# Patient Record
Sex: MALE | Race: WHITE | Employment: PART TIME | URBAN - METROPOLITAN AREA
[De-identification: names, ages, dates, MRNs, and addresses within clinical notes are randomized per-mention and may not be internally consistent; named-entity substitution may affect disease eponyms.]

---

## 2017-03-07 ENCOUNTER — NURSE ONLY (OUTPATIENT)
Dept: CARDIOLOGY CLINIC | Age: 57
End: 2017-03-07

## 2017-03-07 DIAGNOSIS — Z95.810 AUTOMATIC IMPLANTABLE CARDIOVERTER-DEFIBRILLATOR IN SITU: ICD-10-CM

## 2017-03-07 DIAGNOSIS — I50.22 CHRONIC SYSTOLIC HEART FAILURE (HCC): ICD-10-CM

## 2017-03-07 PROCEDURE — 93297 REM INTERROG DEV EVAL ICPMS: CPT | Performed by: INTERNAL MEDICINE

## 2017-03-07 PROCEDURE — 93295 DEV INTERROG REMOTE 1/2/MLT: CPT | Performed by: INTERNAL MEDICINE

## 2017-03-07 PROCEDURE — 93296 REM INTERROG EVL PM/IDS: CPT | Performed by: INTERNAL MEDICINE

## 2017-04-21 DIAGNOSIS — I10 ESSENTIAL HYPERTENSION: ICD-10-CM

## 2017-04-24 RX ORDER — OLMESARTAN MEDOXOMIL 20 MG/1
TABLET ORAL
Qty: 45 TABLET | Refills: 6 | Status: SHIPPED | OUTPATIENT
Start: 2017-04-24 | End: 2017-10-11 | Stop reason: SDUPTHER

## 2017-05-10 DIAGNOSIS — I42.9 CARDIOMYOPATHY (HCC): ICD-10-CM

## 2017-05-11 RX ORDER — METOPROLOL SUCCINATE 200 MG/1
200 TABLET, EXTENDED RELEASE ORAL
Qty: 30 TABLET | Refills: 5 | Status: SHIPPED | OUTPATIENT
Start: 2017-05-11 | End: 2017-10-06 | Stop reason: SDUPTHER

## 2017-06-06 ENCOUNTER — PROCEDURE VISIT (OUTPATIENT)
Dept: CARDIOLOGY CLINIC | Age: 57
End: 2017-06-06

## 2017-06-06 ENCOUNTER — OFFICE VISIT (OUTPATIENT)
Dept: CARDIOLOGY CLINIC | Age: 57
End: 2017-06-06

## 2017-06-06 VITALS
BODY MASS INDEX: 36.16 KG/M2 | SYSTOLIC BLOOD PRESSURE: 136 MMHG | DIASTOLIC BLOOD PRESSURE: 76 MMHG | HEART RATE: 61 BPM | WEIGHT: 252.6 LBS | HEIGHT: 70 IN

## 2017-06-06 DIAGNOSIS — I44.2 CHB (COMPLETE HEART BLOCK) (HCC): ICD-10-CM

## 2017-06-06 DIAGNOSIS — R06.02 SOB (SHORTNESS OF BREATH): ICD-10-CM

## 2017-06-06 DIAGNOSIS — I15.9 SECONDARY HYPERTENSION: ICD-10-CM

## 2017-06-06 DIAGNOSIS — Z95.0 S/P PLACEMENT OF CARDIAC PACEMAKER: ICD-10-CM

## 2017-06-06 DIAGNOSIS — Z95.810 AUTOMATIC IMPLANTABLE CARDIOVERTER-DEFIBRILLATOR IN SITU: ICD-10-CM

## 2017-06-06 DIAGNOSIS — R00.1 BRADYCARDIA: Primary | ICD-10-CM

## 2017-06-06 DIAGNOSIS — I50.22 CHRONIC SYSTOLIC HEART FAILURE (HCC): ICD-10-CM

## 2017-06-06 DIAGNOSIS — Z95.810 BIVENTRICULAR AUTOMATIC IMPLANTABLE CARDIOVERTER DEFIBRILLATOR IN SITU: ICD-10-CM

## 2017-06-06 PROCEDURE — 93290 INTERROG DEV EVAL ICPMS IP: CPT | Performed by: INTERNAL MEDICINE

## 2017-06-06 PROCEDURE — 93284 PRGRMG EVAL IMPLANTABLE DFB: CPT | Performed by: INTERNAL MEDICINE

## 2017-06-06 PROCEDURE — 99214 OFFICE O/P EST MOD 30 MIN: CPT | Performed by: INTERNAL MEDICINE

## 2017-08-17 DIAGNOSIS — I50.22 CHRONIC SYSTOLIC HEART FAILURE (HCC): Primary | ICD-10-CM

## 2017-08-30 ENCOUNTER — HOSPITAL ENCOUNTER (OUTPATIENT)
Dept: NON INVASIVE DIAGNOSTICS | Age: 57
Discharge: HOME | End: 2017-08-30
Attending: INTERNAL MEDICINE | Admitting: INTERNAL MEDICINE

## 2017-08-30 DIAGNOSIS — I50.22 CHRONIC SYSTOLIC HEART FAILURE (HCC): ICD-10-CM

## 2017-08-30 LAB
LV EF: 40 %
LVEF MODALITY: NORMAL

## 2017-08-31 ENCOUNTER — TELEPHONE (OUTPATIENT)
Dept: CARDIOLOGY CLINIC | Age: 57
End: 2017-08-31

## 2017-09-12 ENCOUNTER — NURSE ONLY (OUTPATIENT)
Dept: CARDIOLOGY CLINIC | Age: 57
End: 2017-09-12

## 2017-09-12 DIAGNOSIS — Z95.810 BIVENTRICULAR AUTOMATIC IMPLANTABLE CARDIOVERTER DEFIBRILLATOR IN SITU: ICD-10-CM

## 2017-09-12 DIAGNOSIS — I50.22 CHRONIC SYSTOLIC HEART FAILURE (HCC): ICD-10-CM

## 2017-09-12 DIAGNOSIS — Z95.810 AUTOMATIC IMPLANTABLE CARDIOVERTER-DEFIBRILLATOR IN SITU: ICD-10-CM

## 2017-09-12 PROCEDURE — 93297 REM INTERROG DEV EVAL ICPMS: CPT | Performed by: INTERNAL MEDICINE

## 2017-09-12 PROCEDURE — 93295 DEV INTERROG REMOTE 1/2/MLT: CPT | Performed by: INTERNAL MEDICINE

## 2017-09-12 PROCEDURE — 93296 REM INTERROG EVL PM/IDS: CPT | Performed by: INTERNAL MEDICINE

## 2017-09-19 ENCOUNTER — TELEPHONE (OUTPATIENT)
Dept: CARDIOLOGY CLINIC | Age: 57
End: 2017-09-19

## 2017-09-26 ENCOUNTER — PROCEDURE VISIT (OUTPATIENT)
Dept: CARDIOLOGY CLINIC | Age: 57
End: 2017-09-26

## 2017-09-26 ENCOUNTER — OFFICE VISIT (OUTPATIENT)
Dept: CARDIOLOGY CLINIC | Age: 57
End: 2017-09-26

## 2017-09-26 VITALS
DIASTOLIC BLOOD PRESSURE: 70 MMHG | HEART RATE: 60 BPM | WEIGHT: 254.4 LBS | SYSTOLIC BLOOD PRESSURE: 126 MMHG | BODY MASS INDEX: 36.5 KG/M2

## 2017-09-26 DIAGNOSIS — N52.9 ERECTILE DYSFUNCTION, UNSPECIFIED ERECTILE DYSFUNCTION TYPE: Primary | ICD-10-CM

## 2017-09-26 DIAGNOSIS — R06.02 SOB (SHORTNESS OF BREATH): Primary | ICD-10-CM

## 2017-09-26 DIAGNOSIS — I44.2 CHB (COMPLETE HEART BLOCK) (HCC): ICD-10-CM

## 2017-09-26 DIAGNOSIS — I42.9 CARDIOMYOPATHY, UNSPECIFIED TYPE (HCC): Primary | ICD-10-CM

## 2017-09-26 DIAGNOSIS — Z95.0 S/P PLACEMENT OF CARDIAC PACEMAKER: ICD-10-CM

## 2017-09-26 DIAGNOSIS — I50.22 CHRONIC SYSTOLIC HEART FAILURE (HCC): ICD-10-CM

## 2017-09-26 DIAGNOSIS — Z95.810 BIVENTRICULAR AUTOMATIC IMPLANTABLE CARDIOVERTER DEFIBRILLATOR IN SITU: ICD-10-CM

## 2017-09-26 PROCEDURE — 93290 INTERROG DEV EVAL ICPMS IP: CPT | Performed by: INTERNAL MEDICINE

## 2017-09-26 PROCEDURE — 93284 PRGRMG EVAL IMPLANTABLE DFB: CPT | Performed by: INTERNAL MEDICINE

## 2017-09-26 PROCEDURE — 99215 OFFICE O/P EST HI 40 MIN: CPT | Performed by: INTERNAL MEDICINE

## 2017-09-26 RX ORDER — TADALAFIL 5 MG/1
5 TABLET ORAL PRN
Qty: 30 TABLET | Refills: 3 | Status: SHIPPED | OUTPATIENT
Start: 2017-09-26 | End: 2019-12-19

## 2017-10-05 DIAGNOSIS — I42.9 CARDIOMYOPATHY, UNSPECIFIED TYPE (HCC): ICD-10-CM

## 2017-10-06 DIAGNOSIS — I42.9 CARDIOMYOPATHY, UNSPECIFIED TYPE (HCC): ICD-10-CM

## 2017-10-06 RX ORDER — METOPROLOL SUCCINATE 200 MG/1
200 TABLET, EXTENDED RELEASE ORAL
Qty: 30 TABLET | Refills: 5 | Status: CANCELLED | OUTPATIENT
Start: 2017-10-06

## 2017-10-06 RX ORDER — METOPROLOL SUCCINATE 200 MG/1
200 TABLET, EXTENDED RELEASE ORAL
Qty: 90 TABLET | Refills: 5 | Status: SHIPPED | OUTPATIENT
Start: 2017-10-06 | End: 2017-10-11 | Stop reason: ALTCHOICE

## 2017-10-06 RX ORDER — METOPROLOL SUCCINATE 50 MG/1
TABLET, EXTENDED RELEASE ORAL
Qty: 30 TABLET | Refills: 5 | OUTPATIENT
Start: 2017-10-06

## 2017-10-06 NOTE — TELEPHONE ENCOUNTER
Chart states he is taking toprol 200 mg po daily and the refill is for torpol 50 mg. Please call patient and clarify.

## 2017-10-06 NOTE — TELEPHONE ENCOUNTER
Requested Prescriptions     Pending Prescriptions Disp Refills    metoprolol succinate (TOPROL XL) 50 MG extended release tablet [Pharmacy Med Name: METOPROLOL SUCC ER 50 MG TAB] 90 tablet 4     Sig: TAKE ONE TABLET BY MOUTH DAILY

## 2017-10-11 ENCOUNTER — OFFICE VISIT (OUTPATIENT)
Dept: CARDIOLOGY CLINIC | Age: 57
End: 2017-10-11

## 2017-10-11 ENCOUNTER — PROCEDURE VISIT (OUTPATIENT)
Dept: CARDIOLOGY CLINIC | Age: 57
End: 2017-10-11

## 2017-10-11 VITALS
HEART RATE: 68 BPM | BODY MASS INDEX: 36.59 KG/M2 | DIASTOLIC BLOOD PRESSURE: 80 MMHG | WEIGHT: 255 LBS | SYSTOLIC BLOOD PRESSURE: 134 MMHG

## 2017-10-11 DIAGNOSIS — I50.22 CHRONIC SYSTOLIC HEART FAILURE (HCC): Primary | ICD-10-CM

## 2017-10-11 DIAGNOSIS — I42.9 CARDIOMYOPATHY, UNSPECIFIED TYPE (HCC): ICD-10-CM

## 2017-10-11 DIAGNOSIS — R06.02 SOB (SHORTNESS OF BREATH): ICD-10-CM

## 2017-10-11 DIAGNOSIS — I15.9 SECONDARY HYPERTENSION: ICD-10-CM

## 2017-10-11 DIAGNOSIS — Z95.810 ICD (IMPLANTABLE CARDIOVERTER-DEFIBRILLATOR) IN PLACE: ICD-10-CM

## 2017-10-11 DIAGNOSIS — I10 ESSENTIAL HYPERTENSION: ICD-10-CM

## 2017-10-11 DIAGNOSIS — Z95.810 BIVENTRICULAR AUTOMATIC IMPLANTABLE CARDIOVERTER DEFIBRILLATOR IN SITU: ICD-10-CM

## 2017-10-11 DIAGNOSIS — Z95.810 AUTOMATIC IMPLANTABLE CARDIOVERTER-DEFIBRILLATOR IN SITU: ICD-10-CM

## 2017-10-11 DIAGNOSIS — I50.22 CHRONIC SYSTOLIC HEART FAILURE (HCC): ICD-10-CM

## 2017-10-11 PROCEDURE — 93290 INTERROG DEV EVAL ICPMS IP: CPT | Performed by: INTERNAL MEDICINE

## 2017-10-11 PROCEDURE — 99244 OFF/OP CNSLTJ NEW/EST MOD 40: CPT | Performed by: INTERNAL MEDICINE

## 2017-10-11 RX ORDER — METOPROLOL SUCCINATE 100 MG/1
150 TABLET, EXTENDED RELEASE ORAL DAILY
Qty: 45 TABLET | Refills: 5 | Status: SHIPPED | OUTPATIENT
Start: 2017-10-11 | End: 2018-04-24 | Stop reason: SDUPTHER

## 2017-10-11 RX ORDER — ESCITALOPRAM OXALATE 10 MG/1
10 TABLET ORAL DAILY
COMMUNITY
End: 2019-12-19

## 2017-10-11 RX ORDER — OLMESARTAN MEDOXOMIL 20 MG/1
30 TABLET ORAL DAILY
Qty: 45 TABLET | Refills: 6 | Status: SHIPPED | OUTPATIENT
Start: 2017-10-11 | End: 2018-04-24 | Stop reason: SDUPTHER

## 2017-10-11 NOTE — PROGRESS NOTES
[address]  Advanced CHF/Pulmonary Hypertension   Cardiac Evaluation      Noble Rao  YOB: 1960    Date of Visit:  10/11/17      Chief Complaint   Patient presents with    Congestive Heart Failure        History of Present Illness:  Noble Rao is a 56 y.o. male who presents from referral from Dr. Lily Cloud for consultation and management of heart failure. He has a H/o HTN, idiopathic CHB, s/p dual chamber Medtronic PPM (5/2014). Drop in EF with 100%  and s/p CRT-D upgrade. NYHA class II. He complains of high stress with losing his job and taking care of his mother. Also complains of increased diaphoresis with any activity, fatigue, and erectile dysfunction. Denies complaints of chest pain or SOB. Pt reports \"PVC have increased\" according to his ICD interrogation when he saw Dr. Lily Cloud last visit. He has been trying to cut back on caffeine. Device interrogation today shows a decrease by half of PVCs since last interrogation. Reports his weight has steadily increased although denies edema when he sticks to low sodium diet. Has not had a sleep study in the past.   His PCP suggested increasing Benicar and decreasing Toprol XL due to his impotence and weight gain. He is also asking about switching to Coreg. He had been recommended to see a sleep doctor, but he has not see one.        Allergies   Allergen Reactions    Doxycycline     Lisinopril Other (See Comments)     dizziness    Losartan     Septra [Sulfamethoxazole-Trimethoprim] Other (See Comments)     Pt states he breaks out    Sulfa Antibiotics      \"Breaks Out\"    Tetracyclines & Related      \"Breaks Out\"     Current Outpatient Prescriptions   Medication Sig Dispense Refill    escitalopram (LEXAPRO) 10 MG tablet Take 10 mg by mouth daily      metoprolol succinate (TOPROL XL) 200 MG extended release tablet Take 1 tablet by mouth Daily with supper 90 tablet 5    tadalafil (CIALIS) 5 MG tablet Take 1 tablet by mouth as needed for Erectile Dysfunction 30 tablet 3    olmesartan (BENICAR) 20 MG tablet TAKE ONE TABLET BY MOUTH EVERY MORNING AND TAKE ONE-HALF TABLET BY MOUTH EVERY EVENING 45 tablet 6    cephALEXin (KEFLEX) 500 MG capsule Take 1 capsule by mouth 3 times daily 30 capsule 0    NONFORMULARY Vitamin A -- pt unsure of dose; takes intermittently      famotidine (PEPCID) 20 MG tablet Take 20 mg by mouth nightly as needed. No current facility-administered medications for this visit. Past Medical History:   Diagnosis Date    Acid reflux     Bradycardia     Cardiomyopathy ([address]     CHB (complete heart block) (HCC)     [facility]ive [facility]     Hypertension     ICD (implantable cardioverter-defibrillator) battery depletion 5/11/16    Dual chamber Medtronic - Dr. Wayne Chase     Past Surgical History:   Procedure Laterality Date    CARDIAC PACEMAKER PLACEMENT      COSMETIC SURGERY      PACEMAKER PLACEMENT       Family History   Problem Relation Age of Onset    Diabetes Father     Arrhythmia Mother    [facility] Arthritis Mother      Social History     Social History    Marital status: Single     Spouse name: N/A    Number of children: N/A    Years of education: N/A     Occupational History     Irs     Corrects      Social History Main Topics    Smoking status: Never Smoker    Smokeless tobacco: Never Used    Alcohol use No    Drug use: No    Sexual activity: Not on file      Comment: Engaged     Other Topics Concern    Not on file     Social History Narrative    No narrative on file     Cardiac MRI 2015  FINDINGS: The left ventricle is mildly dilated. There is significant  wall motion abnormality of the left ventricle with akinesis of the  septum, anteroseptal wall, and apex and hypokinesis of the remaining  left ventricle. End-diastolic volume is 766 cc. End systolic volume is  116 cc. Left ventricular ejection fraction is 35%.     Size and wall motion of the right ventricle is normal. There is  susceptibility artifact within the right atrium and right ventricle  related to pacemaker leads. There is no pericardial effusion. The  mitral valve and aortic valve are unremarkable. On resting perfusion images, no resting perfusion defects are  identified. On delayed myocardial enhancement sequences, there is no delayed  myocardial enhancement to suggest myocardial fibrosis or infiltrative  Process. Echo 8/30/17  Summary   Left ventricular size is mildly increased. Normal left ventricular wall   thickness. Left ventricular function is moderately reduced with ejection   fraction estimated at 40 %. Global hypokinesis of left ventricle. Diastolic   filling parameters suggests grade I diastolic dysfunction . There is mild   tricuspid regurgitation with RVSP estimated at 30 mmHg. ( The ejection   fraction has improved from previous echo on 3/30/16.)    Review of Systems:   · Constitutional: there has been no unanticipated weight loss. There's been no change in energy level, sleep pattern, or activity level. · Eyes: No visual changes or diplopia. No scleral icterus. · ENT: No Headaches, hearing loss or vertigo. No mouth sores or sore throat. · Cardiovascular: Reviewed in HPI  · Respiratory: No cough or wheezing, no sputum production. No hematemesis. · Gastrointestinal: No abdominal pain, appetite loss, blood in stools. No change in bowel or bladder habits. · Genitourinary: No dysuria, trouble voiding, or hematuria. · Musculoskeletal:  No gait disturbance, weakness or joint complaints. · Integumentary: No rash or pruritis. · Neurological: No headache, diplopia, change in muscle strength, numbness or tingling. No change in gait, balance, coordination, mood, affect, memory, mentation, behavior. · Psychiatric: No anxiety, no depression. · Endocrine: No malaise, fatigue or temperature intolerance. No excessive thirst, fluid intake, or urination.  No tremor. · Hematologic/Lymphatic: No abnormal bruising or bleeding, blood clots or swollen lymph nodes. · Allergic/Immunologic: No nasal congestion or hives. Physical Examination:    Vitals:    10/11/17 1444   BP: 134/80   Pulse: 68   Weight: 255 lb (115.7 kg)     Body mass index is 36.59 kg/m². Wt Readings from Last 3 Encounters:   10/11/17 255 lb (115.7 kg)   09/26/17 254 lb 6.4 oz (115.4 kg)   06/06/17 252 lb 9.6 oz (114.6 kg)     BP Readings from Last 3 Encounters:   10/11/17 134/80   09/26/17 126/70   06/06/17 136/76     Constitutional and General Appearance:   WD/WN in NAD, obese  HEENT:  NC/AT  CALVIN  No problems with hearing  Skin:  Warm, dry  Respiratory:  · Normal excursion and expansion without use of accessory muscles  · Resp Auscultation: Normal breath sounds without dullness  Cardiovascular:  · The apical impulses not displaced  · Heart tones are crisp and normal  · Cervical veins are not engorged  · The carotid upstroke is normal in amplitude and contour without delay or bruit  · JVP less than 8 cm H2O  RRR with nl S1 and S2 without m,r,g  · Peripheral pulses are symmetrical and full  · There is no clubbing, cyanosis of the extremities. · No edema  · Femoral Arteries: 2+ and equal  · Pedal Pulses: 2+ and equal   Neck:  · No thyromegaly  Abdomen:  · No masses or tenderness  · Liver/Spleen: No Abnormalities Noted  Neurological/Psychiatric:  · Alert and oriented in all spheres  · Moves all extremities well  · Exhibits normal gait balance and coordination  · No abnormalities of mood, affect, memory, mentation, or behavior are noted      Assessment:    1. Chronic systolic heart failure ([address]    2. Biventricular automatic implantable cardioverter defibrillator in situ    3. ICD (implantable cardioverter-defibrillator) in place    4. Secondary hypertension    5. SOB (shortness of breath)    6. Essential hypertension    7.  Cardiomyopathy, unspecified type       Would like to consider adjusting his meds for better quality of life. Perhaps trying to reduce toprol XL and/or switch to Coreg. Plan:  1. IESHA - Refer for sleep study, as this may have been contributing to his PVC's and his weight gain  2. HTN - elevated today. Will increase benicar to 30 mg daily  3. Discussed weight management. Pt wants to decrease toprol as he feels it is effecting his metabolism. Will decrease to 150 mg daily. If patient feels okay, will then decrease to 100 mg and perhaps switch to Coreg next visit. 4.  Will get labs checked in the next month, CBC, CMP, BNP, lipid, TSH, FT4  5. Follow up in 2 months      I appreciate the opportunity of cooperating in the care of this patient.     Rene Mckoy M.D., [facility] [place]

## 2017-10-20 DIAGNOSIS — I50.22 CHRONIC SYSTOLIC HEART FAILURE (HCC): ICD-10-CM

## 2017-10-20 LAB
A/G RATIO: 1.7 (ref 1.1–2.2)
ALBUMIN SERPL-MCNC: 4.5 G/DL (ref 3.4–5)
ALP BLD-CCNC: 63 U/L (ref 40–129)
ALT SERPL-CCNC: 30 U/L (ref 10–40)
ANION GAP SERPL CALCULATED.3IONS-SCNC: 16 MMOL/L (ref 3–16)
AST SERPL-CCNC: 19 U/L (ref 15–37)
BASOPHILS ABSOLUTE: 0 K/UL (ref 0–0.2)
BASOPHILS RELATIVE PERCENT: 0.4 %
BILIRUB SERPL-MCNC: 0.7 MG/DL (ref 0–1)
BUN BLDV-MCNC: 12 MG/DL (ref 7–20)
CALCIUM SERPL-MCNC: 9.1 MG/DL (ref 8.3–10.6)
CHLORIDE BLD-SCNC: 100 MMOL/L (ref 99–110)
CHOLESTEROL, TOTAL: 153 MG/DL (ref 0–199)
CO2: 24 MMOL/L (ref 21–32)
CREAT SERPL-MCNC: 0.9 MG/DL (ref 0.9–1.3)
EOSINOPHILS ABSOLUTE: 0.1 K/UL (ref 0–0.6)
EOSINOPHILS RELATIVE PERCENT: 0.7 %
GFR AFRICAN AMERICAN: >60
GFR NON-AFRICAN AMERICAN: >60
GLOBULIN: 2.6 G/DL
GLUCOSE BLD-MCNC: 150 MG/DL (ref 70–99)
HCT VFR BLD CALC: 45.3 % (ref 40.5–52.5)
HDLC SERPL-MCNC: 39 MG/DL (ref 40–60)
HEMOGLOBIN: 15.6 G/DL (ref 13.5–17.5)
LDL CHOLESTEROL CALCULATED: 88 MG/DL
LYMPHOCYTES ABSOLUTE: 1.2 K/UL (ref 1–5.1)
LYMPHOCYTES RELATIVE PERCENT: 14.4 %
MCH RBC QN AUTO: 32 PG (ref 26–34)
MCHC RBC AUTO-ENTMCNC: 34.5 G/DL (ref 31–36)
MCV RBC AUTO: 92.6 FL (ref 80–100)
MONOCYTES ABSOLUTE: 0.7 K/UL (ref 0–1.3)
MONOCYTES RELATIVE PERCENT: 8.9 %
NEUTROPHILS ABSOLUTE: 6.1 K/UL (ref 1.7–7.7)
NEUTROPHILS RELATIVE PERCENT: 75.6 %
PDW BLD-RTO: 13 % (ref 12.4–15.4)
PLATELET # BLD: 141 K/UL (ref 135–450)
PMV BLD AUTO: 8.8 FL (ref 5–10.5)
POTASSIUM SERPL-SCNC: 4.1 MMOL/L (ref 3.5–5.1)
PRO-BNP: 469 PG/ML (ref 0–124)
RBC # BLD: 4.89 M/UL (ref 4.2–5.9)
SODIUM BLD-SCNC: 140 MMOL/L (ref 136–145)
TOTAL PROTEIN: 7.1 G/DL (ref 6.4–8.2)
TRIGL SERPL-MCNC: 128 MG/DL (ref 0–150)
TSH REFLEX: 1.78 UIU/ML (ref 0.27–4.2)
VLDLC SERPL CALC-MCNC: 26 MG/DL
WBC # BLD: 8.1 K/UL (ref 4–11)

## 2017-10-24 ENCOUNTER — TELEPHONE (OUTPATIENT)
Dept: CARDIOLOGY CLINIC | Age: 57
End: 2017-10-24

## 2017-10-24 NOTE — TELEPHONE ENCOUNTER
Calling to inform Dr Db Lord that the Sleep Dr she referred him to is not available until January.     His insurance is [facility]    Please call him to advise

## 2017-10-26 NOTE — TELEPHONE ENCOUNTER
Dr. Jadon Gamboa sent message to Dr. Orquidea Louise asking for pt to be seen sooner. Spoke with pt, stated his office already called this morning and appt made for initial consultation tomorrow. Pt appreciative of our help.

## 2017-10-27 ENCOUNTER — OFFICE VISIT (OUTPATIENT)
Dept: SLEEP MEDICINE | Age: 57
End: 2017-10-27

## 2017-10-27 VITALS
BODY MASS INDEX: 36.36 KG/M2 | OXYGEN SATURATION: 98 % | HEART RATE: 63 BPM | HEIGHT: 70 IN | DIASTOLIC BLOOD PRESSURE: 86 MMHG | WEIGHT: 254 LBS | SYSTOLIC BLOOD PRESSURE: 126 MMHG

## 2017-10-27 DIAGNOSIS — I15.9 SECONDARY HYPERTENSION: Chronic | ICD-10-CM

## 2017-10-27 DIAGNOSIS — I50.22 CHRONIC SYSTOLIC HEART FAILURE (HCC): Chronic | ICD-10-CM

## 2017-10-27 DIAGNOSIS — R06.83 SNORING: ICD-10-CM

## 2017-10-27 DIAGNOSIS — E66.9 NON MORBID OBESITY, UNSPECIFIED OBESITY TYPE: Chronic | ICD-10-CM

## 2017-10-27 DIAGNOSIS — G47.10 HYPERSOMNIA: Primary | ICD-10-CM

## 2017-10-27 PROCEDURE — G8484 FLU IMMUNIZE NO ADMIN: HCPCS | Performed by: INTERNAL MEDICINE

## 2017-10-27 PROCEDURE — G8417 CALC BMI ABV UP PARAM F/U: HCPCS | Performed by: INTERNAL MEDICINE

## 2017-10-27 PROCEDURE — G8427 DOCREV CUR MEDS BY ELIG CLIN: HCPCS | Performed by: INTERNAL MEDICINE

## 2017-10-27 PROCEDURE — 99244 OFF/OP CNSLTJ NEW/EST MOD 40: CPT | Performed by: INTERNAL MEDICINE

## 2017-10-27 PROCEDURE — 3017F COLORECTAL CA SCREEN DOC REV: CPT | Performed by: INTERNAL MEDICINE

## 2017-10-27 ASSESSMENT — ENCOUNTER SYMPTOMS
PHOTOPHOBIA: 0
VOMITING: 0
ABDOMINAL DISTENTION: 0
CHEST TIGHTNESS: 0
ABDOMINAL PAIN: 0
EYE PAIN: 0
NAUSEA: 0
CHOKING: 0
APNEA: 0
RHINORRHEA: 0
SHORTNESS OF BREATH: 0
ALLERGIC/IMMUNOLOGIC NEGATIVE: 1

## 2017-10-27 ASSESSMENT — SLEEP AND FATIGUE QUESTIONNAIRES
HOW LIKELY ARE YOU TO NOD OFF OR FALL ASLEEP WHILE SITTING AND READING: 0
HOW LIKELY ARE YOU TO NOD OFF OR FALL ASLEEP WHILE LYING DOWN TO REST IN THE AFTERNOON WHEN CIRCUMSTANCES PERMIT: 0
HOW LIKELY ARE YOU TO NOD OFF OR FALL ASLEEP WHILE WATCHING TV: 1
HOW LIKELY ARE YOU TO NOD OFF OR FALL ASLEEP IN A CAR, WHILE STOPPED FOR A FEW MINUTES IN TRAFFIC: 0
HOW LIKELY ARE YOU TO NOD OFF OR FALL ASLEEP WHILE SITTING INACTIVE IN A PUBLIC PLACE: 0
HOW LIKELY ARE YOU TO NOD OFF OR FALL ASLEEP WHILE SITTING QUIETLY AFTER LUNCH WITHOUT ALCOHOL: 0
HOW LIKELY ARE YOU TO NOD OFF OR FALL ASLEEP WHILE SITTING AND TALKING TO SOMEONE: 0
HOW LIKELY ARE YOU TO NOD OFF OR FALL ASLEEP WHEN YOU ARE A PASSENGER IN A CAR FOR AN HOUR WITHOUT A BREAK: 0
ESS TOTAL SCORE: 1
NECK CIRCUMFERENCE (INCHES): 17.5

## 2017-10-27 NOTE — PROGRESS NOTES
Arrhythmia Mother     Arthritis Mother        Review of Systems   Constitutional: Positive for fatigue. Negative for activity change and appetite change. HENT: Positive for congestion. Negative for nosebleeds, postnasal drip, rhinorrhea and sneezing. Eyes: Negative for photophobia, pain and visual disturbance. Respiratory: Negative for apnea, choking, chest tightness and shortness of breath. Cardiovascular: Negative. Gastrointestinal: Negative for abdominal distention, abdominal pain, nausea and vomiting. Endocrine: Negative for cold intolerance and heat intolerance. Genitourinary: Negative for difficulty urinating, dysuria, frequency and urgency. Musculoskeletal: Negative. Negative for neck pain and neck stiffness. Skin: Negative. Allergic/Immunologic: Negative. Neurological: Negative for tremors, seizures, syncope and weakness. Hematological: Negative for adenopathy. Does not bruise/bleed easily. Psychiatric/Behavioral: Positive for sleep disturbance. Negative for agitation, behavioral problems and confusion. Objective:     Vitals:  Weight BMI   Wt Readings from Last 3 Encounters:   10/27/17 254 lb (115.2 kg)   10/11/17 255 lb (115.7 kg)   09/26/17 254 lb 6.4 oz (115.4 kg)    Body mass index is 36.45 kg/m². BP HR SaO2   BP Readings from Last 3 Encounters:   10/27/17 126/86   10/11/17 134/80   09/26/17 126/70    Pulse Readings from Last 3 Encounters:   10/27/17 63   10/11/17 68   09/26/17 60    SpO2 Readings from Last 3 Encounters:   10/27/17 98%   08/18/16 97%   05/12/16 98%        Physical Exam   Constitutional: He is oriented to person, place, and time. Vital signs are normal. He appears well-developed and well-nourished. Non-toxic appearance. He does not have a sickly appearance. HENT:   Head: Normocephalic and atraumatic. Not macrocephalic and not microcephalic.    Right Ear: External ear normal.   Left Ear: External ear normal.   Nose: Mucosal edema and septal deviation present. Mouth/Throat: Uvula is midline and mucous membranes are normal. Posterior oropharyngeal edema present. No oropharyngeal exudate or tonsillar abscesses. Tonsils:   absent bilaterally  Post. Pharynx:   normal mucosa  Neck circumference: 17.5  Inches   Eyes: Conjunctivae, EOM and lids are normal. Pupils are equal, round, and reactive to light. Neck: Trachea normal and normal range of motion. Neck supple. No tracheal deviation present. No thyroid mass and no thyromegaly present. Cardiovascular: Normal rate, regular rhythm, S1 normal, S2 normal and normal heart sounds. Pulmonary/Chest: Effort normal and breath sounds normal. No apnea. No respiratory distress. He has no wheezes. He has no rhonchi. He has no rales. Abdominal: Soft. Bowel sounds are normal. He exhibits no distension. There is no tenderness. Musculoskeletal: Normal range of motion. He exhibits no edema. Lymphadenopathy:        Head (right side): No submental, no submandibular and no tonsillar adenopathy present. Head (left side): No submental, no submandibular and no tonsillar adenopathy present. Neurological: He is alert and oriented to person, place, and time. Skin: Skin is warm, dry and intact. No cyanosis. Nails show no clubbing. Psychiatric: He has a normal mood and affect. His speech is normal and behavior is normal. Thought content normal.   Nursing note and vitals reviewed. Assessment:      1. Hypersomnia  Baseline Diagnostic Sleep Study    new problem, needs w/u   2. Snoring  Baseline Diagnostic Sleep Study    new problem, needs w/u   3. Chronic systolic heart failure (HCC) Stable    4. Secondary hypertension Stable    5. Non morbid obesity, unspecified obesity type Stable         Plan:      Differential diagnosis includes but not limited to: IESHA, PLMD's, narcolepsy, parasomnias. Reviewed IESHA (which is the highest likelihood diagnosis): pathophysiology, diagnosis, complications and treatment.

## 2017-12-12 ENCOUNTER — NURSE ONLY (OUTPATIENT)
Dept: CARDIOLOGY CLINIC | Age: 57
End: 2017-12-12

## 2017-12-12 DIAGNOSIS — Z95.810 AUTOMATIC IMPLANTABLE CARDIOVERTER-DEFIBRILLATOR IN SITU: ICD-10-CM

## 2017-12-12 DIAGNOSIS — I50.22 CHRONIC SYSTOLIC HEART FAILURE (HCC): ICD-10-CM

## 2017-12-12 DIAGNOSIS — Z95.810 BIVENTRICULAR AUTOMATIC IMPLANTABLE CARDIOVERTER DEFIBRILLATOR IN SITU: ICD-10-CM

## 2017-12-12 PROCEDURE — 93295 DEV INTERROG REMOTE 1/2/MLT: CPT | Performed by: INTERNAL MEDICINE

## 2017-12-12 PROCEDURE — 93296 REM INTERROG EVL PM/IDS: CPT | Performed by: INTERNAL MEDICINE

## 2017-12-12 PROCEDURE — 93297 REM INTERROG DEV EVAL ICPMS: CPT | Performed by: INTERNAL MEDICINE

## 2017-12-12 NOTE — LETTER
[address] [facility] [phone]  [address] [phone]    Pacemaker/Defibrillator Clinic          12/15/17        Bubba Latham  [address]        Dear Bubba Latham    This letter is to inform you that we received the transmission from your monitor at home that checks your pacemaker and/or defibrillator, or implanted heart monitor. Everything is within normal limits. The next date your monitor will automatically transmit will be 3/13/18. Please do not send additional routine transmissions unless specifically requested. Your device and monitor are wireless and most transmit cellularly, but please periodically check your monitor is still plugged in to the electrical outlet. If you still use the telephone land line to send please ensure the connection to the phone dai is secure. This will help to ensure successful automatic transmissions in the future. Also, the monitor needs to be close to you while sleeping at night. Please be aware that the remote device transmission sites are periodically monitored only during regular business hours during which simultaneous in-office device clinics are being run. If your transmission requires attention, we will contact you as soon as possible. Thank you.             [address]

## 2017-12-13 ENCOUNTER — HOSPITAL ENCOUNTER (OUTPATIENT)
Dept: SLEEP MEDICINE | Age: 57
Discharge: HOME | End: 2017-12-15
Attending: INTERNAL MEDICINE | Admitting: INTERNAL MEDICINE

## 2017-12-13 DIAGNOSIS — G47.10 HYPERSOMNIA: ICD-10-CM

## 2017-12-13 DIAGNOSIS — R06.83 SNORING: ICD-10-CM

## 2017-12-13 PROCEDURE — 95810 POLYSOM 6/> YRS 4/> PARAM: CPT | Performed by: INTERNAL MEDICINE

## 2017-12-15 NOTE — PROGRESS NOTES
Carelink transmission shows normal device function. Battery and lead function stable. No VT or AT/AF. Optivol consistent. See interrogation for further details. Recheck 3 mos.  mk

## 2017-12-22 ENCOUNTER — TELEPHONE (OUTPATIENT)
Dept: PULMONOLOGY | Age: 57
End: 2017-12-22

## 2018-03-13 ENCOUNTER — NURSE ONLY (OUTPATIENT)
Dept: CARDIOLOGY CLINIC | Age: 58
End: 2018-03-13

## 2018-03-13 DIAGNOSIS — Z95.810 BIVENTRICULAR AUTOMATIC IMPLANTABLE CARDIOVERTER DEFIBRILLATOR IN SITU: ICD-10-CM

## 2018-03-13 DIAGNOSIS — Z95.810 AUTOMATIC IMPLANTABLE CARDIOVERTER-DEFIBRILLATOR IN SITU: ICD-10-CM

## 2018-03-13 DIAGNOSIS — I50.22 CHRONIC SYSTOLIC HEART FAILURE (HCC): ICD-10-CM

## 2018-03-13 PROCEDURE — 93295 DEV INTERROG REMOTE 1/2/MLT: CPT | Performed by: INTERNAL MEDICINE

## 2018-03-13 PROCEDURE — 93297 REM INTERROG DEV EVAL ICPMS: CPT | Performed by: INTERNAL MEDICINE

## 2018-03-13 PROCEDURE — 93296 REM INTERROG EVL PM/IDS: CPT | Performed by: INTERNAL MEDICINE

## 2018-03-16 NOTE — PROGRESS NOTES
Carelink transmission shows normal device function. Battery and lead function stable. No VT or AT/AF. Optivol stable and consistent. See Paceart report under cardiology tab. Recheck 3 mos.  mk

## 2018-03-29 ENCOUNTER — TELEPHONE (OUTPATIENT)
Dept: CARDIOLOGY CLINIC | Age: 58
End: 2018-03-29

## 2018-04-24 ENCOUNTER — OFFICE VISIT (OUTPATIENT)
Dept: CARDIOLOGY CLINIC | Age: 58
End: 2018-04-24

## 2018-04-24 ENCOUNTER — PROCEDURE VISIT (OUTPATIENT)
Dept: CARDIOLOGY CLINIC | Age: 58
End: 2018-04-24

## 2018-04-24 VITALS
BODY MASS INDEX: 38.22 KG/M2 | DIASTOLIC BLOOD PRESSURE: 74 MMHG | HEART RATE: 60 BPM | SYSTOLIC BLOOD PRESSURE: 132 MMHG | WEIGHT: 266.4 LBS

## 2018-04-24 DIAGNOSIS — I49.3 PVC'S (PREMATURE VENTRICULAR CONTRACTIONS): ICD-10-CM

## 2018-04-24 DIAGNOSIS — I10 ESSENTIAL HYPERTENSION: ICD-10-CM

## 2018-04-24 DIAGNOSIS — Z95.0 S/P PLACEMENT OF CARDIAC PACEMAKER: ICD-10-CM

## 2018-04-24 DIAGNOSIS — I44.30 AVB (ATRIOVENTRICULAR BLOCK): ICD-10-CM

## 2018-04-24 DIAGNOSIS — G47.33 SEVERE OBSTRUCTIVE SLEEP APNEA: ICD-10-CM

## 2018-04-24 DIAGNOSIS — Z95.810 AUTOMATIC IMPLANTABLE CARDIOVERTER-DEFIBRILLATOR IN SITU: ICD-10-CM

## 2018-04-24 DIAGNOSIS — Z95.810 BIVENTRICULAR IMPLANTABLE CARDIOVERTER-DEFIBRILLATOR (ICD) IN SITU: Primary | ICD-10-CM

## 2018-04-24 DIAGNOSIS — I50.22 CHRONIC SYSTOLIC HEART FAILURE (HCC): ICD-10-CM

## 2018-04-24 DIAGNOSIS — Z95.810 BIVENTRICULAR AUTOMATIC IMPLANTABLE CARDIOVERTER DEFIBRILLATOR IN SITU: ICD-10-CM

## 2018-04-24 PROCEDURE — 99215 OFFICE O/P EST HI 40 MIN: CPT | Performed by: INTERNAL MEDICINE

## 2018-04-24 PROCEDURE — 93284 PRGRMG EVAL IMPLANTABLE DFB: CPT | Performed by: INTERNAL MEDICINE

## 2018-04-24 PROCEDURE — 93290 INTERROG DEV EVAL ICPMS IP: CPT | Performed by: INTERNAL MEDICINE

## 2018-04-24 RX ORDER — OLMESARTAN MEDOXOMIL 20 MG/1
30 TABLET ORAL DAILY
Qty: 45 TABLET | Refills: 6 | Status: SHIPPED | OUTPATIENT
Start: 2018-04-24 | End: 2019-12-19

## 2018-04-24 RX ORDER — METOPROLOL SUCCINATE 100 MG/1
150 TABLET, EXTENDED RELEASE ORAL DAILY
Qty: 45 TABLET | Refills: 6 | Status: SHIPPED | OUTPATIENT
Start: 2018-04-24 | End: 2018-10-18 | Stop reason: SDUPTHER

## 2018-05-03 ENCOUNTER — PROCEDURE VISIT (OUTPATIENT)
Dept: CARDIOLOGY CLINIC | Age: 58
End: 2018-05-03

## 2018-05-03 ENCOUNTER — OFFICE VISIT (OUTPATIENT)
Dept: CARDIOLOGY CLINIC | Age: 58
End: 2018-05-03

## 2018-05-03 VITALS
DIASTOLIC BLOOD PRESSURE: 64 MMHG | HEART RATE: 65 BPM | BODY MASS INDEX: 37.25 KG/M2 | SYSTOLIC BLOOD PRESSURE: 110 MMHG | WEIGHT: 259.6 LBS

## 2018-05-03 DIAGNOSIS — Z95.0 S/P PLACEMENT OF CARDIAC PACEMAKER: ICD-10-CM

## 2018-05-03 DIAGNOSIS — I15.9 SECONDARY HYPERTENSION: Chronic | ICD-10-CM

## 2018-05-03 DIAGNOSIS — I50.22 CHRONIC SYSTOLIC HEART FAILURE (HCC): Chronic | ICD-10-CM

## 2018-05-03 DIAGNOSIS — I50.22 CHRONIC SYSTOLIC HEART FAILURE (HCC): Primary | Chronic | ICD-10-CM

## 2018-05-03 DIAGNOSIS — Z95.810 AUTOMATIC IMPLANTABLE CARDIOVERTER-DEFIBRILLATOR IN SITU: ICD-10-CM

## 2018-05-03 DIAGNOSIS — R06.02 SOB (SHORTNESS OF BREATH): Chronic | ICD-10-CM

## 2018-05-03 DIAGNOSIS — Z95.810 ICD (IMPLANTABLE CARDIOVERTER-DEFIBRILLATOR) IN PLACE: Chronic | ICD-10-CM

## 2018-05-03 DIAGNOSIS — Z95.810 BIVENTRICULAR AUTOMATIC IMPLANTABLE CARDIOVERTER DEFIBRILLATOR IN SITU: Chronic | ICD-10-CM

## 2018-05-03 DIAGNOSIS — G47.33 OBSTRUCTIVE SLEEP APNEA SYNDROME: Chronic | ICD-10-CM

## 2018-05-03 PROCEDURE — 93290 INTERROG DEV EVAL ICPMS IP: CPT | Performed by: INTERNAL MEDICINE

## 2018-05-03 PROCEDURE — 93284 PRGRMG EVAL IMPLANTABLE DFB: CPT | Performed by: INTERNAL MEDICINE

## 2018-05-03 PROCEDURE — 99214 OFFICE O/P EST MOD 30 MIN: CPT | Performed by: INTERNAL MEDICINE

## 2018-05-08 ENCOUNTER — PROCEDURE VISIT (OUTPATIENT)
Dept: CARDIOLOGY CLINIC | Age: 58
End: 2018-05-08

## 2018-05-08 DIAGNOSIS — Z95.0 S/P PLACEMENT OF CARDIAC PACEMAKER: ICD-10-CM

## 2018-05-10 ENCOUNTER — PROCEDURE VISIT (OUTPATIENT)
Dept: CARDIOLOGY CLINIC | Age: 58
End: 2018-05-10

## 2018-05-10 ENCOUNTER — TELEPHONE (OUTPATIENT)
Dept: CARDIOLOGY CLINIC | Age: 58
End: 2018-05-10

## 2018-05-10 DIAGNOSIS — Z95.810 AUTOMATIC IMPLANTABLE CARDIOVERTER-DEFIBRILLATOR IN SITU: ICD-10-CM

## 2018-05-10 DIAGNOSIS — Z95.0 S/P PLACEMENT OF CARDIAC PACEMAKER: ICD-10-CM

## 2018-05-15 ENCOUNTER — HOSPITAL ENCOUNTER (OUTPATIENT)
Dept: NON INVASIVE DIAGNOSTICS | Age: 58
Discharge: HOME | End: 2018-05-15
Attending: INTERNAL MEDICINE | Admitting: INTERNAL MEDICINE

## 2018-05-15 DIAGNOSIS — I50.22 CHRONIC SYSTOLIC CONGESTIVE HEART FAILURE (HCC): ICD-10-CM

## 2018-05-15 LAB
LV EF: 53 %
LVEF MODALITY: NORMAL

## 2018-05-24 ENCOUNTER — TELEPHONE (OUTPATIENT)
Dept: CARDIOLOGY CLINIC | Age: 58
End: 2018-05-24

## 2018-07-24 ENCOUNTER — NURSE ONLY (OUTPATIENT)
Dept: CARDIOLOGY CLINIC | Age: 58
End: 2018-07-24

## 2018-07-24 DIAGNOSIS — Z95.810 AUTOMATIC IMPLANTABLE CARDIOVERTER-DEFIBRILLATOR IN SITU: ICD-10-CM

## 2018-07-24 DIAGNOSIS — I50.22 CHRONIC SYSTOLIC HEART FAILURE (HCC): Chronic | ICD-10-CM

## 2018-07-24 PROCEDURE — 93295 DEV INTERROG REMOTE 1/2/MLT: CPT | Performed by: INTERNAL MEDICINE

## 2018-07-24 PROCEDURE — 93297 REM INTERROG DEV EVAL ICPMS: CPT | Performed by: INTERNAL MEDICINE

## 2018-07-24 PROCEDURE — 93296 REM INTERROG EVL PM/IDS: CPT | Performed by: INTERNAL MEDICINE

## 2018-08-01 NOTE — PROGRESS NOTES
Carelink transmission shows normal device function. Battery and lead function stable. No VT or AT/AF. Corvue TI currently at baseline. See Paceart report under cardiology tab. Recheck 3 mos.  mk

## 2018-10-18 ENCOUNTER — OFFICE VISIT (OUTPATIENT)
Dept: CARDIOLOGY CLINIC | Age: 58
End: 2018-10-18
Payer: COMMERCIAL

## 2018-10-18 VITALS
SYSTOLIC BLOOD PRESSURE: 140 MMHG | BODY MASS INDEX: 37.59 KG/M2 | WEIGHT: 262 LBS | HEART RATE: 70 BPM | DIASTOLIC BLOOD PRESSURE: 80 MMHG

## 2018-10-18 DIAGNOSIS — G47.33 OBSTRUCTIVE SLEEP APNEA SYNDROME: Chronic | ICD-10-CM

## 2018-10-18 DIAGNOSIS — I50.22 CHRONIC SYSTOLIC HEART FAILURE (HCC): Primary | Chronic | ICD-10-CM

## 2018-10-18 DIAGNOSIS — R06.02 SOB (SHORTNESS OF BREATH): Chronic | ICD-10-CM

## 2018-10-18 DIAGNOSIS — Z95.810 ICD (IMPLANTABLE CARDIOVERTER-DEFIBRILLATOR) IN PLACE: Chronic | ICD-10-CM

## 2018-10-18 DIAGNOSIS — Z95.810 BIVENTRICULAR AUTOMATIC IMPLANTABLE CARDIOVERTER DEFIBRILLATOR IN SITU: Chronic | ICD-10-CM

## 2018-10-18 PROCEDURE — 99214 OFFICE O/P EST MOD 30 MIN: CPT | Performed by: INTERNAL MEDICINE

## 2018-10-18 NOTE — PROGRESS NOTES
Procedure Laterality Date    CARDIAC PACEMAKER PLACEMENT      COSMETIC SURGERY      PACEMAKER PLACEMENT      TONSILLECTOMY       Family History   Problem Relation Age of Onset    Diabetes Father     Arrhythmia Mother    Rafita Gather Arthritis Mother      Social History     Social History    Marital status: Single     Spouse name: N/A    Number of children: N/A    Years of education: N/A     Occupational History     Irs     Corrects      Social History Main Topics    Smoking status: Never Smoker    Smokeless tobacco: Never Used    Alcohol use No    Drug use: No    Sexual activity: Not on file      Comment: Engaged     Other Topics Concern    Not on file     Social History Narrative    No narrative on file     Cardiac MRI 2015  FINDINGS: The left ventricle is mildly dilated. There is significant  wall motion abnormality of the left ventricle with akinesis of the  septum, anteroseptal wall, and apex and hypokinesis of the remaining  left ventricle. End-diastolic volume is 802 cc. End systolic volume is  511 cc. Left ventricular ejection fraction is 35%. Size and wall motion of the right ventricle is normal. There is  susceptibility artifact within the right atrium and right ventricle  related to pacemaker leads. There is no pericardial effusion. The  mitral valve and aortic valve are unremarkable. On resting perfusion images, no resting perfusion defects are  identified. On delayed myocardial enhancement sequences, there is no delayed  myocardial enhancement to suggest myocardial fibrosis or infiltrative  Process. Echo 8/30/17  Summary   Left ventricular size is mildly increased. Normal left ventricular wall   thickness. Left ventricular function is moderately reduced with ejection   fraction estimated at 40 %. Global hypokinesis of left ventricle. Diastolic   filling parameters suggests grade I diastolic dysfunction . There is mild   tricuspid regurgitation with RVSP estimated at 30 mmHg.  ( rate of  54 (Stage 2 Manual). Exercise was terminated because of VALIENTE request. The  test showed High Grade AV Block, persistent through exercise.     Pacer Placement-8/17/2016 The device is [id number] with SN# [id number] Medtronic implant date: 5/11/2016   Atrial lead: #2846-28 with serial number# [id number] implant date: 5/13/2014  RV lead: # [id number]-88 with serial number# [id number] implant date: 5/11/2016   LV lead: #[id number] with serial number# [id number] implant date: 8/17/2016  Atrial lead: Impedance: 418 Pacing threshold: 0.75 @ 0.4 ms sensing 3.0 mV  RV lead: Impedance: 399 Pacing threshold: 1 @ 0.4 ms sensing, no sable underlying  LV lead: Impedence 931 Pacing threshold 1.25 @ 0.4 (L1-L4)  Device was programmed to DDD with lower rate of 60 and upper tracking rate of 130.      Angio 2014: non obstructive         Physical Examination:    Vitals:    10/18/18 1507 10/18/18 1510   BP: (!) 140/90 (!) 140/80   Pulse: 70    Weight: 262 lb (118.8 kg)      Body mass index is 37.59 kg/m². Wt Readings from Last 3 Encounters:   10/18/18 262 lb (118.8 kg)   05/03/18 259 lb 9.6 oz (117.8 kg)   04/24/18 266 lb 6.4 oz (120.8 kg)     BP Readings from Last 3 Encounters:   10/18/18 (!) 140/80   05/03/18 110/64   04/24/18 132/74     Constitutional and General Appearance:   WD/WN in NAD, obese  HEENT:  NC/AT  CALVIN  No problems with hearing  Skin:  Warm, dry  Respiratory:  · Normal excursion and expansion without use of accessory muscles  · Resp Auscultation: Normal breath sounds without dullness  Cardiovascular:  · The apical impulses not displaced  · Heart tones are crisp and normal  · Cervical veins are not engorged  · The carotid upstroke is normal in amplitude and contour without delay or bruit  · JVP less than 8 cm H2O  RRR with nl S1 and S2 without m,r,g  · Peripheral pulses are symmetrical and full  · There is no clubbing, cyanosis of the extremities.   · No edema  · Femoral Arteries: 2+ and equal  · Pedal Pulses: 2+ and

## 2018-10-18 NOTE — LETTER
exercise. States he has been working a limited work schedule at 20 hours a week and has switched employers and now is 40 hours a week, so he is also seeking another letter to state he benefits from a limited work schedule. He has been taking care of and lives with his mother who was recently diagnosed with vaginal cancer and she is looking to start radiation treatments. He is fatigue. No chest pain. Has had feelings of near syncope but no passing out. He has been on high dose of metoprolol 200 mg but it was reduced to 100 mg. He has not noticed any increase in palpitations. His energy level is about the same. He is taking benicar 1/2 tab at night and 1 during the day. He has seen Dr. Jailene Vogel, has severe sleep apnea, getting a titration study (this should have already been done, but he had conflicts with his mom's illness. His echo shows improvement in his heart. His leg swelling is down. He denies lightheadedness, dizziness, chest pain, SOB. Allergies   Allergen Reactions    Doxycycline     Lisinopril Other (See Comments)     dizziness    Losartan     Septra [Sulfamethoxazole-Trimethoprim] Other (See Comments)     Pt states he breaks out    Sulfa Antibiotics      \"Breaks Out\"    Tetracyclines & Related      \"Breaks Out\"     Current Outpatient Prescriptions   Medication Sig Dispense Refill    olmesartan (BENICAR) 20 MG tablet Take 1.5 tablets by mouth daily 45 tablet 6    metoprolol succinate (TOPROL XL) 100 MG extended release tablet Take 1.5 tablets by mouth daily 45 tablet 6    escitalopram (LEXAPRO) 10 MG tablet Take 10 mg by mouth daily      tadalafil (CIALIS) 5 MG tablet Take 1 tablet by mouth as needed for Erectile Dysfunction 30 tablet 3    cephALEXin (KEFLEX) 500 MG capsule Take 1 capsule by mouth 3 times daily 30 capsule 0    NONFORMULARY Vitamin A -- pt unsure of dose; takes intermittently      famotidine (PEPCID) 20 MG tablet Take 20 mg by mouth nightly as needed. No current facility-administered medications for this visit. Past Medical History:   Diagnosis Date    Acid reflux     Bradycardia     Cardiomy[facility]     CHB (complete heart block) (HCC)     CHF (congestive heart failure) (HCC)     Hypertension     ICD (implantable cardioverter-defibrillator) battery depletion 5/11/16    Dual chamber Medtronic - Dr. Tamir York     Past Surgical History:   Procedure Laterality Date    CARDIAC PACEMAKER PLACEMENT      COSMETIC SURGERY      PACEMAKER PLACEMENT      TONSILLECTOMY       Family History   Problem Relation Age of Onset    Diabetes Father     Arrhythmia Mother    Piper Hernandez Arthritis Mother      Social History     Social History    Marital status: Single     Spouse name: N/A    Number of children: N/A    Years of education: N/A     Occupational History     Irs     Corrects      Social History Main Topics    Smoking status: Never Smoker    Smokeless tobacco: Never Used    Alcohol use No    Drug use: No    Sexual activity: Not on file      Comment: Engaged     Other Topics Concern    Not on file     Social History Narrative    No narrative on file     Cardiac MRI 2015  FINDINGS: The left ventricle is mildly dilated. There is significant  wall motion abnormality of the left ventricle with akinesis of the  septum, anteroseptal wall, and apex and hypokinesis of the remaining  left ventricle. End-diastolic volume is 982 cc. End systolic volume is  554 cc. Left ventricular ejection fraction is 35%. Size and wall motion of the right ventricle is normal. There is  susceptibility artifact within the right atrium and right ventricle  related to pacemaker leads. There is no pericardial effusion. The  mitral valve and aortic valve are unremarkable. On resting perfusion images, no resting perfusion defects are  identified.     On delayed myocardial enhancement sequences, there is no delayed

## 2018-10-19 RX ORDER — METOPROLOL SUCCINATE 50 MG/1
50 TABLET, EXTENDED RELEASE ORAL DAILY
Qty: 90 TABLET | Refills: 3
Start: 2018-10-19 | End: 2019-12-19

## 2018-10-30 ENCOUNTER — NURSE ONLY (OUTPATIENT)
Dept: CARDIOLOGY CLINIC | Age: 58
End: 2018-10-30
Payer: COMMERCIAL

## 2018-10-30 DIAGNOSIS — I50.22 CHRONIC SYSTOLIC HEART FAILURE (HCC): Chronic | ICD-10-CM

## 2018-10-30 DIAGNOSIS — Z95.810 BIVENTRICULAR AUTOMATIC IMPLANTABLE CARDIOVERTER DEFIBRILLATOR IN SITU: Chronic | ICD-10-CM

## 2018-10-30 PROCEDURE — 93296 REM INTERROG EVL PM/IDS: CPT | Performed by: INTERNAL MEDICINE

## 2018-10-30 PROCEDURE — 93297 REM INTERROG DEV EVAL ICPMS: CPT | Performed by: INTERNAL MEDICINE

## 2018-10-30 PROCEDURE — 93295 DEV INTERROG REMOTE 1/2/MLT: CPT | Performed by: INTERNAL MEDICINE

## 2018-11-13 NOTE — PROGRESS NOTES
Carelink transmission shows normal device function. Battery and lead function stable. No VT or AT/AF. Optivol stable. See Paceart report under cardiology tab. Recheck 3 mos.  mk

## 2018-11-19 NOTE — COMMUNICATION BODY
Tobacco Cessation Counseling: NA  2. Retake of BP if >140/90: NA    3. Documentation to PCP/referring for new patient:  Sent to PCP at close of office visit  4. CAD patient on anti-platelet: NA  5. CAD patient on STATIN therapy: NA  6. Patient with CHF and aFib on anticoagulation: NA           I appreciate the opportunity of cooperating in the care of this patient.     Amber Otoole M.D., [facility] [place]

## 2019-01-29 ENCOUNTER — NURSE ONLY (OUTPATIENT)
Dept: CARDIOLOGY CLINIC | Age: 59
End: 2019-01-29
Payer: COMMERCIAL

## 2019-01-29 DIAGNOSIS — I50.22 CHRONIC SYSTOLIC HEART FAILURE (HCC): Chronic | ICD-10-CM

## 2019-01-29 DIAGNOSIS — Z95.810 BIVENTRICULAR AUTOMATIC IMPLANTABLE CARDIOVERTER DEFIBRILLATOR IN SITU: Chronic | ICD-10-CM

## 2019-01-29 PROCEDURE — 93296 REM INTERROG EVL PM/IDS: CPT | Performed by: INTERNAL MEDICINE

## 2019-01-29 PROCEDURE — 93295 DEV INTERROG REMOTE 1/2/MLT: CPT | Performed by: INTERNAL MEDICINE

## 2019-01-29 PROCEDURE — 93297 REM INTERROG DEV EVAL ICPMS: CPT | Performed by: INTERNAL MEDICINE

## 2019-02-04 RX ORDER — METOPROLOL SUCCINATE 100 MG/1
TABLET, EXTENDED RELEASE ORAL
Qty: 45 TABLET | Refills: 0 | Status: SHIPPED | OUTPATIENT
Start: 2019-02-04 | End: 2019-07-02 | Stop reason: SDUPTHER

## 2019-02-06 ENCOUNTER — TELEPHONE (OUTPATIENT)
Dept: CARDIOLOGY CLINIC | Age: 59
End: 2019-02-06

## 2019-04-30 ENCOUNTER — NURSE ONLY (OUTPATIENT)
Dept: CARDIOLOGY CLINIC | Age: 59
End: 2019-04-30
Payer: COMMERCIAL

## 2019-04-30 DIAGNOSIS — I50.22 CHRONIC SYSTOLIC HEART FAILURE (HCC): Chronic | ICD-10-CM

## 2019-04-30 DIAGNOSIS — Z95.810 AUTOMATIC IMPLANTABLE CARDIOVERTER-DEFIBRILLATOR IN SITU: ICD-10-CM

## 2019-04-30 DIAGNOSIS — Z95.810 BIVENTRICULAR AUTOMATIC IMPLANTABLE CARDIOVERTER DEFIBRILLATOR IN SITU: Chronic | ICD-10-CM

## 2019-04-30 PROCEDURE — 93296 REM INTERROG EVL PM/IDS: CPT | Performed by: INTERNAL MEDICINE

## 2019-04-30 PROCEDURE — 93297 REM INTERROG DEV EVAL ICPMS: CPT | Performed by: INTERNAL MEDICINE

## 2019-04-30 PROCEDURE — 93295 DEV INTERROG REMOTE 1/2/MLT: CPT | Performed by: INTERNAL MEDICINE

## 2019-05-03 NOTE — PROGRESS NOTES
Carelink transmission shows normal device function. Battery and lead function stable. No VT or AT/AF. Optivol TI is above baseline. See Paceart report under cardiology tab. Recheck 3 mos.  mk

## 2019-07-02 RX ORDER — METOPROLOL SUCCINATE 100 MG/1
TABLET, EXTENDED RELEASE ORAL
Qty: 135 TABLET | Refills: 0 | Status: SHIPPED | OUTPATIENT
Start: 2019-07-02 | End: 2020-05-19 | Stop reason: SDUPTHER

## 2019-08-06 ENCOUNTER — NURSE ONLY (OUTPATIENT)
Dept: CARDIOLOGY CLINIC | Age: 59
End: 2019-08-06

## 2019-08-06 DIAGNOSIS — I44.2 CHB (COMPLETE HEART BLOCK) (HCC): Chronic | ICD-10-CM

## 2019-08-06 DIAGNOSIS — I42.9 CARDIOMYOPATHY, UNSPECIFIED TYPE (HCC): Chronic | ICD-10-CM

## 2019-08-06 DIAGNOSIS — Z95.810 BIVENTRICULAR AUTOMATIC IMPLANTABLE CARDIOVERTER DEFIBRILLATOR IN SITU: Primary | Chronic | ICD-10-CM

## 2019-08-06 DIAGNOSIS — I50.22 CHRONIC SYSTOLIC HEART FAILURE (HCC): Chronic | ICD-10-CM

## 2019-09-20 ENCOUNTER — TELEPHONE (OUTPATIENT)
Dept: CARDIOLOGY CLINIC | Age: 59
End: 2019-09-20

## 2019-09-20 NOTE — TELEPHONE ENCOUNTER
Left message for pt. August check cancelled as requested. Will resume routine follow up unless requested otherwise.

## 2019-11-05 ENCOUNTER — NURSE ONLY (OUTPATIENT)
Dept: CARDIOLOGY CLINIC | Age: 59
End: 2019-11-05
Payer: COMMERCIAL

## 2019-11-05 DIAGNOSIS — Z95.810 BIVENTRICULAR AUTOMATIC IMPLANTABLE CARDIOVERTER DEFIBRILLATOR IN SITU: Chronic | ICD-10-CM

## 2019-11-05 DIAGNOSIS — I50.22 CHRONIC SYSTOLIC HEART FAILURE (HCC): Chronic | ICD-10-CM

## 2019-11-05 DIAGNOSIS — I44.2 CHB (COMPLETE HEART BLOCK) (HCC): Chronic | ICD-10-CM

## 2019-11-05 DIAGNOSIS — I42.9 CARDIOMYOPATHY, UNSPECIFIED TYPE (HCC): Chronic | ICD-10-CM

## 2019-11-05 PROCEDURE — 93296 REM INTERROG EVL PM/IDS: CPT | Performed by: INTERNAL MEDICINE

## 2019-11-05 PROCEDURE — 93297 REM INTERROG DEV EVAL ICPMS: CPT | Performed by: INTERNAL MEDICINE

## 2019-11-05 PROCEDURE — 93295 DEV INTERROG REMOTE 1/2/MLT: CPT | Performed by: INTERNAL MEDICINE

## 2019-12-19 ENCOUNTER — OFFICE VISIT (OUTPATIENT)
Dept: CARDIOLOGY CLINIC | Age: 59
End: 2019-12-19
Payer: COMMERCIAL

## 2019-12-19 ENCOUNTER — PROCEDURE VISIT (OUTPATIENT)
Dept: CARDIOLOGY CLINIC | Age: 59
End: 2019-12-19
Payer: COMMERCIAL

## 2019-12-19 VITALS
DIASTOLIC BLOOD PRESSURE: 80 MMHG | HEIGHT: 70 IN | BODY MASS INDEX: 35.65 KG/M2 | OXYGEN SATURATION: 97 % | SYSTOLIC BLOOD PRESSURE: 134 MMHG | WEIGHT: 249 LBS | HEART RATE: 73 BPM

## 2019-12-19 DIAGNOSIS — I44.2 CHB (COMPLETE HEART BLOCK) (HCC): Chronic | ICD-10-CM

## 2019-12-19 DIAGNOSIS — I50.22 CHRONIC SYSTOLIC HEART FAILURE (HCC): Chronic | ICD-10-CM

## 2019-12-19 DIAGNOSIS — I42.9 CARDIOMYOPATHY, UNSPECIFIED TYPE (HCC): Chronic | ICD-10-CM

## 2019-12-19 DIAGNOSIS — I50.22 CHRONIC SYSTOLIC HEART FAILURE (HCC): Primary | Chronic | ICD-10-CM

## 2019-12-19 DIAGNOSIS — Z95.810 BIVENTRICULAR AUTOMATIC IMPLANTABLE CARDIOVERTER DEFIBRILLATOR IN SITU: Chronic | ICD-10-CM

## 2019-12-19 PROCEDURE — 93290 INTERROG DEV EVAL ICPMS IP: CPT | Performed by: INTERNAL MEDICINE

## 2019-12-19 PROCEDURE — 93299 PR REM INTERROG ICPMS/SCRMS <30 D TECH REVIEW: CPT | Performed by: INTERNAL MEDICINE

## 2019-12-19 PROCEDURE — 99214 OFFICE O/P EST MOD 30 MIN: CPT | Performed by: INTERNAL MEDICINE

## 2019-12-19 PROCEDURE — 93298 REM INTERROG DEV EVAL SCRMS: CPT | Performed by: INTERNAL MEDICINE

## 2019-12-19 RX ORDER — VALSARTAN 80 MG/1
80 TABLET ORAL DAILY
Qty: 90 TABLET | Refills: 3 | Status: SHIPPED | OUTPATIENT
Start: 2019-12-19 | End: 2021-10-07

## 2020-03-18 ENCOUNTER — TELEPHONE (OUTPATIENT)
Dept: CARDIOLOGY CLINIC | Age: 60
End: 2020-03-18

## 2020-03-18 NOTE — TELEPHONE ENCOUNTER
I called patient and advised him that he needs to call Charisse Agustin as they will take care of the letter and not our office

## 2020-03-18 NOTE — TELEPHONE ENCOUNTER
Asking if JOSTIN (or HF team) would write him a letter stating due to his medical conditions it would be best if he stayed home from work . 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 Please call when ready and he will  letter .

## 2020-03-18 NOTE — TELEPHONE ENCOUNTER
I spoke with pt . He has a hx of CHF, has an ICD and a tertiary block. He states that his office requested a medical opinion as to whether pt should work from home.  JOSTNI ANDERSON

## 2020-03-18 NOTE — TELEPHONE ENCOUNTER
Please tell him that this is Dr Vernon Rothman response \"LVEF <40% with job aroung lots of people should stay home\"

## 2020-03-18 NOTE — TELEPHONE ENCOUNTER
Dr. Dee Pizarro should have a physician covering for her during her absence. This should be deferred to the covering physician at [address] [place] [address].

## 2020-05-05 ENCOUNTER — NURSE ONLY (OUTPATIENT)
Dept: CARDIOLOGY CLINIC | Age: 60
End: 2020-05-05
Payer: COMMERCIAL

## 2020-05-05 PROCEDURE — 93295 DEV INTERROG REMOTE 1/2/MLT: CPT | Performed by: INTERNAL MEDICINE

## 2020-05-05 PROCEDURE — 93296 REM INTERROG EVL PM/IDS: CPT | Performed by: INTERNAL MEDICINE

## 2020-05-18 NOTE — PROGRESS NOTES
[facility]   Cardiac Consultation    Referring Provider:  Quiana Graff DO     No chief complaint on file. HPI:  Kamran Reilly is a 64 y.o. male previous patient of Dr. Jodi Rodriguez with PMH of bradycardia, AV block s/p dual chamber PPM in 2014. Post implant he was almost 100% . EF had been normal pre-implant at 55% with normal coronaries. During a follow up, EF was noted to be 15% on echo (11/2015) and 35% per cardiac MRI (12/2015). NYHA class II. S/p upgrade to dual chamber ICD on 5/11/16 with multiple unsuccessful attempts to cannulate the CS. Re-attempt on 8/17/16 with upgrade to CRT-D device. He has had frequent PVC's seen in follow up. Echo (5/15/18) showed improved EF of 50-55%. Currently on Toprol 100 mg daily and valsartan 80 mg daily. Device interrogation today shows normally functioning CRT-D with stable sensing and pacing thresholds. AP 6%, BiVP 99%. No AF or VT noted. PVC's (from 5/2018-12/2019) were 4/hr. However, they have increased to 93/hr (12/2019-current). DEANDRE at 5 yrs. He feels well. Denies complaints of palpitations, dizziness, CP, SOB, orthopnea, presyncope, or syncope. Past Medical History:   has a past medical history of Acid reflux, Bradycardia, Cardiomyopathy ([address] CHB (complete heart block) ([address] CHF (congestive heart failure) ([address] Hypertension, and ICD (implantable cardioverter-defibrillator) battery depletion. Surgical History:   has a past surgical history that includes Cardiac pacemaker placement; pacemaker placement; Cosmetic surgery; and Tonsillectomy. Social History:   reports that he has never smoked. He has never used smokeless tobacco. He reports that he does not drink alcohol or use drugs. Family History:  family history includes Arrhythmia in his mother; Arthritis in his mother; Diabetes in his father.      Home Medications:  Outpatient Encounter Medications as of 5/19/2020   Medication Sig Dispense Refill    normal.   Abdominal: Soft. No tenderness. Musculoskeletal: Normal range of motion. He exhibits no edema. Neurological: He is alert and oriented to person, place, and time. Skin: Skin is warm and dry. Psychiatric: He has a normal mood and affect. Assessment:  1. NICM - EF improved to 50-55% (5/2018). On Toprol and valsartan   2. S/p upgrade to CRT-D 8/2016- Device with normal function today. BiVP 99%    3. AV block- Original PPM in 2014   4. Chronic systolic HF- Follows Dr. Domonique Reeves   5. PVC's- Interior has increased from 4/hr to 93/hr since 12/2019. Asymptomatic. Plan:  1. Wear 24 hr Mathias monitor to assess PVC burden, morphology, and behavior pattern  2. No change in current medications  3. Follow up in 6 months, sooner based on monitor results    IChristina RN, am scribing for and in the presence of Dr. Cindy Farooq. 05/19/20 3:57 PM  Christina Ackerman RN    I, Dr. Cindy Farooq, personally performed the services described in this documentation as scribed by Christina Ackerman RN in my presence, and it is both accurate and complete.       Cindy Farooq M.D.

## 2020-05-19 ENCOUNTER — OFFICE VISIT (OUTPATIENT)
Dept: CARDIOLOGY CLINIC | Age: 60
End: 2020-05-19
Payer: COMMERCIAL

## 2020-05-19 ENCOUNTER — NURSE ONLY (OUTPATIENT)
Dept: CARDIOLOGY CLINIC | Age: 60
End: 2020-05-19
Payer: COMMERCIAL

## 2020-05-19 VITALS
TEMPERATURE: 97.7 F | SYSTOLIC BLOOD PRESSURE: 130 MMHG | BODY MASS INDEX: 34.22 KG/M2 | HEIGHT: 70 IN | HEART RATE: 66 BPM | DIASTOLIC BLOOD PRESSURE: 80 MMHG | WEIGHT: 239 LBS

## 2020-05-19 PROCEDURE — 93284 PRGRMG EVAL IMPLANTABLE DFB: CPT | Performed by: INTERNAL MEDICINE

## 2020-05-19 PROCEDURE — 93290 INTERROG DEV EVAL ICPMS IP: CPT | Performed by: INTERNAL MEDICINE

## 2020-05-19 PROCEDURE — 93225 XTRNL ECG REC<48 HRS REC: CPT | Performed by: INTERNAL MEDICINE

## 2020-05-19 PROCEDURE — 99214 OFFICE O/P EST MOD 30 MIN: CPT | Performed by: INTERNAL MEDICINE

## 2020-05-20 RX ORDER — METOPROLOL SUCCINATE 100 MG/1
100 TABLET, EXTENDED RELEASE ORAL DAILY
Qty: 30 TABLET | Refills: 3 | Status: SHIPPED | OUTPATIENT
Start: 2020-05-20 | End: 2020-05-26 | Stop reason: SDUPTHER

## 2020-05-26 ENCOUNTER — NURSE ONLY (OUTPATIENT)
Dept: CARDIOLOGY CLINIC | Age: 60
End: 2020-05-26
Payer: COMMERCIAL

## 2020-05-26 ENCOUNTER — TELEPHONE (OUTPATIENT)
Dept: CARDIOLOGY CLINIC | Age: 60
End: 2020-05-26

## 2020-05-26 ENCOUNTER — OFFICE VISIT (OUTPATIENT)
Dept: CARDIOLOGY CLINIC | Age: 60
End: 2020-05-26
Payer: COMMERCIAL

## 2020-05-26 VITALS
SYSTOLIC BLOOD PRESSURE: 126 MMHG | BODY MASS INDEX: 34.01 KG/M2 | HEART RATE: 70 BPM | DIASTOLIC BLOOD PRESSURE: 68 MMHG | WEIGHT: 237 LBS

## 2020-05-26 PROCEDURE — 93000 ELECTROCARDIOGRAM COMPLETE: CPT | Performed by: NURSE PRACTITIONER

## 2020-05-26 PROCEDURE — 93284 PRGRMG EVAL IMPLANTABLE DFB: CPT | Performed by: INTERNAL MEDICINE

## 2020-05-26 PROCEDURE — 93290 INTERROG DEV EVAL ICPMS IP: CPT | Performed by: NURSE PRACTITIONER

## 2020-05-26 PROCEDURE — 99214 OFFICE O/P EST MOD 30 MIN: CPT | Performed by: NURSE PRACTITIONER

## 2020-05-26 RX ORDER — METOPROLOL SUCCINATE 100 MG/1
150 TABLET, EXTENDED RELEASE ORAL DAILY
Qty: 135 TABLET | Refills: 3 | Status: SHIPPED | OUTPATIENT
Start: 2020-05-26 | End: 2020-11-04

## 2020-05-26 NOTE — TELEPHONE ENCOUNTER
Pt called in requesting to speak to you regarding his pacemaker and call he got from DR. Irene over the weekend regarding his  Pacemaker. Pt can be reached at [phone].  Thanks

## 2020-05-26 NOTE — TELEPHONE ENCOUNTER
Patient said Dr. Ganesh Yuan left a voice mail message that they are looking at his monitor and that he needs to see Dr. Ewa Ortega or if he became systomatic go to er. He is having headaches.

## 2020-05-27 ENCOUNTER — TELEPHONE (OUTPATIENT)
Dept: CARDIOLOGY CLINIC | Age: 60
End: 2020-05-27

## 2020-05-27 ENCOUNTER — HOSPITAL ENCOUNTER (OUTPATIENT)
Age: 60
Discharge: HOME | End: 2020-05-27
Payer: COMMERCIAL

## 2020-05-27 ENCOUNTER — HOSPITAL ENCOUNTER (OUTPATIENT)
Dept: GENERAL RADIOLOGY | Age: 60
Discharge: HOME | End: 2020-05-27
Payer: COMMERCIAL

## 2020-05-27 PROCEDURE — 71046 X-RAY EXAM CHEST 2 VIEWS: CPT

## 2020-05-27 NOTE — PROGRESS NOTES
Device interrogation by company representative. See interrogation for further details. Pt to see Elina Mesa, YOJANA, today. CRT-D w/  99.7%. No  arrhythmias recorded. optivol rising over last week since last check 5/19, has not crossed threshold. He recently wore a 24-hour Holter monitor which was read as underlying complete heart block with failing to sense with a pacemaker.  - PVC count on recent device check showed 96/h.  - 24-hour Holter monitor showed a total of 107 PVCs. CXR shows lead placement is unchanged from previous. No movement of leads.  - Reviewed monitor with Dr. Bernardo Davis and technician, [employer] called and reviewing strips and events on the Holter. Waiting on final determination.   - Adaptive CRT is turned off for now  - Will discuss with Dr. Lon Ibarra when he returns. See PACEART report under Cardiology tab.

## 2020-05-28 ENCOUNTER — TELEPHONE (OUTPATIENT)
Dept: CARDIOLOGY CLINIC | Age: 60
End: 2020-05-28

## 2020-06-15 PROCEDURE — 93227 XTRNL ECG REC<48 HR R&I: CPT | Performed by: INTERNAL MEDICINE

## 2020-08-19 ENCOUNTER — NURSE ONLY (OUTPATIENT)
Dept: CARDIOLOGY CLINIC | Age: 60
End: 2020-08-19
Payer: COMMERCIAL

## 2020-08-19 PROCEDURE — 93297 REM INTERROG DEV EVAL ICPMS: CPT | Performed by: INTERNAL MEDICINE

## 2020-08-19 PROCEDURE — 93295 DEV INTERROG REMOTE 1/2/MLT: CPT | Performed by: INTERNAL MEDICINE

## 2020-08-19 PROCEDURE — 93296 REM INTERROG EVL PM/IDS: CPT | Performed by: INTERNAL MEDICINE

## 2020-08-19 NOTE — PROGRESS NOTES
We received remote transmission from patient's monitor at home. Transmission shows normal sensing and pacing function. EP physician will review. See interrogation under cardiology tab in the [facility] [place] [address] field for more details. Thoracic impedance trend stable. No  arrhythmias recorded. Follow up in 3 months via carelink.

## 2020-09-17 ENCOUNTER — NURSE ONLY (OUTPATIENT)
Dept: CARDIOLOGY CLINIC | Age: 60
End: 2020-09-17
Payer: COMMERCIAL

## 2020-09-17 ENCOUNTER — OFFICE VISIT (OUTPATIENT)
Dept: CARDIOLOGY CLINIC | Age: 60
End: 2020-09-17
Payer: COMMERCIAL

## 2020-09-17 VITALS
BODY MASS INDEX: 32.92 KG/M2 | WEIGHT: 229.4 LBS | DIASTOLIC BLOOD PRESSURE: 70 MMHG | HEART RATE: 64 BPM | TEMPERATURE: 98.4 F | SYSTOLIC BLOOD PRESSURE: 125 MMHG

## 2020-09-17 PROCEDURE — 99214 OFFICE O/P EST MOD 30 MIN: CPT | Performed by: INTERNAL MEDICINE

## 2020-09-17 PROCEDURE — 93284 PRGRMG EVAL IMPLANTABLE DFB: CPT | Performed by: INTERNAL MEDICINE

## 2020-09-17 PROCEDURE — 93290 INTERROG DEV EVAL ICPMS IP: CPT | Performed by: INTERNAL MEDICINE

## 2020-09-17 NOTE — LETTER
[address]  [place] [address] [facility]. [facility]  Phone: [phone]  Fax: [phone]      20    To Whom it May Concern,    Matt Casillas ( 64) is followed by cardiology for congestive heart failure, pacemaker/defibrillator management, and for management of heart failure medications. Therefore, it my medical opinion that he should work from home. If you have any questions or concerns, please don't hesitate to call.     Sincerely,        Abigail Quan M.D.

## 2020-10-06 ENCOUNTER — HOSPITAL ENCOUNTER (OUTPATIENT)
Dept: NON INVASIVE DIAGNOSTICS | Age: 60
Discharge: HOME | End: 2020-10-06
Payer: COMMERCIAL

## 2020-10-06 LAB
LV EF: 53 %
LVEF MODALITY: NORMAL

## 2020-10-06 PROCEDURE — 93306 TTE W/DOPPLER COMPLETE: CPT

## 2020-11-04 ENCOUNTER — OFFICE VISIT (OUTPATIENT)
Dept: CARDIOLOGY CLINIC | Age: 60
End: 2020-11-04
Payer: COMMERCIAL

## 2020-11-04 VITALS
WEIGHT: 228 LBS | BODY MASS INDEX: 32.64 KG/M2 | SYSTOLIC BLOOD PRESSURE: 122 MMHG | HEIGHT: 70 IN | DIASTOLIC BLOOD PRESSURE: 84 MMHG | HEART RATE: 64 BPM

## 2020-11-04 PROCEDURE — 99214 OFFICE O/P EST MOD 30 MIN: CPT | Performed by: INTERNAL MEDICINE

## 2020-11-04 RX ORDER — METOPROLOL SUCCINATE 100 MG/1
100 TABLET, EXTENDED RELEASE ORAL DAILY
Qty: 90 TABLET | Refills: 3
Start: 2020-11-04 | End: 2021-09-20 | Stop reason: SDUPTHER

## 2020-11-04 NOTE — LETTER
[address] [address]  Phone: [phone]  Fax: [phone]    Beatrice Goff MD        November 12, 2020     Helder Mcconnell, [facility] [address]    Patient: Erika Lewis  MR Number: [id number]  YOB: 1960  Date of Visit: 11/4/2020    Dear Dr. Helder Mcconnell:    [address]  Advanced CHF/Pulmonary Hypertension   Cardiac Evaluation      Erika Lewis  YOB: 1960    Date of Visit: 11/4/20        Chief Complaint   Patient presents with    6 Month Follow-Up    Congestive Heart Failure        History of Present Illness:  Erika Lewis is a 64 y.o. male who presents today for follow up for systolic congestive heart failure. He has a H/o HTN, idiopathic CHB, s/p dual chamber Medtronic PPM (5/2014). Drop in EF with 100%  and s/p CRT-D upgrade. NYHA class II. He had a sleep study in 12/2017 and was found to have severe sleep apnea but he thinks that his insurance does not cover the treatment. He had been taking care of and lives with his mother who had vaginal cancer, but she has passed away. Echo repeated 10/6/20 revealed EF 50-55%. Today, Mr Ming Le states he has been fine. His main complaint is fatigue. He works for the [employer] and talks on the phone most of the day. He is not exercising routinely. Grey Roy denies any chest pain, palpitations, dizziness, or edema. He never did have sleep apnea treated due to insurance changes and unsure if covered.          Allergies   Allergen Reactions    Doxycycline     Lisinopril Other (See Comments)     dizziness    Losartan     Septra [Sulfamethoxazole-Trimethoprim] Other (See Comments)     Pt states he breaks out    Sulfa Antibiotics      \"Breaks Out\"    Tetracyclines & Related      \"Breaks Out\"     Current Outpatient Medications   Medication Sig Dispense Refill  metoprolol succinate (TOPROL XL) 100 MG extended release tablet Take 1.5 tablets by mouth daily 135 tablet 3    valsartan (DIOVAN) 80 MG tablet Take 1 tablet by mouth daily 90 tablet 3    famotidine (PEPCID) 20 MG tablet Take 20 mg by mouth nightly as needed. No current facility-administered medications for this visit.         Past Medical History:   Diagnosis Date    Acid reflux     Bradycardia     Cardiomyopathy (HCC)     CHB (complete heart block) (HCC)     [facility]est[facility] failure) [facility]     Hypertension     ICD (implantable cardioverter-defibrillator) battery depletion 5/11/16    Dual chamber Medtronic - Dr. Lexie Brittle     Past Surgical History:   Procedure Laterality Date    CARDIAC PACEMAKER PLACEMENT      COSMETIC SURGERY      PACEMAKER PLACEMENT      TONSILLECTOMY       Family History   Problem Relation Age of Onset    Diabetes Father     Arrhythmia Mother     Arthritis Mother      Social History     Socioeconomic History    Marital status: Single     Spouse name: Not on file    Number of children: Not on file    Years of education: Not on file    Highest education level: Not on file   Occupational History     Employer: [employer]     Comment: Corrects    Social Needs    Financial resource strain: Not on file    Food insecurity     Worry: Not on file     Inability: Not on file    Transportation needs     Medical: Not on file     Non-medical: Not on file   Tobacco Use    Smoking status: Never Smoker    Smokeless tobacco: Never Used   Substance and Sexual Activity    Alcohol use: No    Drug use: No    Sexual activity: Not on file     Comment: Engaged   Lifestyle    Physical activity     Days per week: Not on file     Minutes per session: Not on file    Stress: Not on file   Relationships    Social connections     Talks on phone: Not on file     Gets together: Not on file     Attends Alevism service: Not on file 09/17/20 229 lb 6.4 oz (104.1 kg)   05/26/20 237 lb (107.5 kg)     BP Readings from Last 3 Encounters:   11/04/20 122/84   09/17/20 125/70   05/26/20 126/68     Constitutional and General Appearance:   WD/WN in NAD  HEENT:  NC/AT  CALVIN  No problems with hearing  Skin:  Warm, dry  Respiratory:  · Normal excursion and expansion without use of accessory muscles  · Resp Auscultation: Normal breath sounds without dullness  Cardiovascular:  · The apical impulses not displaced  · Heart tones are crisp and normal  · Cervical veins are not engorged  · The carotid upstroke is normal in amplitude and contour without delay or bruit  · JVP less than 8 cm H2O  RRR with nl S1 and S2 without m,r,g  · Peripheral pulses are symmetrical and full  · There is no clubbing, cyanosis of the extremities. · No edema  · Femoral Arteries: 2+ and equal  · Pedal Pulses: 2+ and equal   Neck:  · No thyromegaly  Abdomen:  · No masses or tenderness  · Liver/Spleen: No Abnormalities Noted  Neurological/Psychiatric:  · Alert and oriented in all spheres  · Moves all extremities well  · Exhibits normal gait balance and coordination  · No abnormalities of mood, affect, memory, mentation, or behavior are noted    Labs were reviewed including labs from other hospital systems through [facility]. Cardiac testing was reviewed including echos, nuclear scans, cardiac catheterization, including from other hospital systems through [facility]. Echo 10/6/20:   Normal left ventricular size. Mild concentric left ventricular hypertrophy. Left ventricular systolic function appears at the lower limits of normal   with an estimated ejection fraction of 50%-55%. Septal bounce likely due to pacing apparatus . Normal diastolic function. Normal right ventricular size with decreased systolic function. Pacer / ICD wire is visualized in the right ventricle. Aortic valve appears sclerotic but opens adequately. Trivial tricuspid regurgitation. Estimated pulmonary artery systolic pressure is at 20 mmHg assuming a right   atrial pressure of 3 mmHg    Cardiac MRI 2015  FINDINGS: The left ventricle is mildly dilated. There is significant  wall motion abnormality of the left ventricle with akinesis of the  septum, anteroseptal wall, and apex and hypokinesis of the remaining  left ventricle. End-diastolic volume is 350 cc. End systolic volume is  413 cc. Left ventricular ejection fraction is 35%. -Size and wall motion of the right ventricle is normal. There is  susceptibility artifact within the right atrium and right ventricle  related to pacemaker leads. There is no pericardial effusion. The  mitral valve and aortic valve are unremarkable.  -On resting perfusion images, no resting perfusion defects are  identified.  -On delayed myocardial enhancement sequences, there is no delayed  myocardial enhancement to suggest myocardial fibrosis or infiltrative  Process. Pacer Placement-8/17/2016 The device is [id number] with SN# [id number] [employer] implant date: 5/11/2016   Atrial lead: #5947-97 with serial number# [id number] implant date: 5/13/2014  RV lead: # [id number]-91 with serial number# [id number] implant date: 5/11/2016   LV lead: #0345-42 with serial number# [id number] implant date: 8/17/2016  Atrial lead: Impedance: 418 Pacing threshold: 0.75 @ 0.4 ms sensing 3.0 mV  RV lead: Impedance: 399 Pacing threshold: 1 @ 0.4 ms sensing, no sable underlying  LV lead: Impedence 931 Pacing threshold 1.25 @ 0.4 (L1-L4)  Device was programmed to DDD with lower rate of 60 and upper tracking rate of 130.      Angio 2014: non obstructive    Echo 5/18  Left ventricular cavity size is normal. There is mild concentric left   ventricular hypertrophy. Overall left ventricular systolic function appears   low normal with an ejection fraction of 50-55%.  No regional wall motion   abnormalities are noted other than septal bounce which may be related to bundle branch block. Mild tricuspid regurgitation. Estimated pulmonary   artery systolic pressure is at 27 mmHg assuming a right atrial pressure of 3   mmHg. Labs were reviewed including labs from other hospital systems through [facility]. Cardiac testing was reviewed including echos, nuclear scans, cardiac catheterization, including from other hospital systems through [facility]. Assessment:    1. Chronic systolic heart failure ([facility]    2. Biventricular automatic implantable cardioverter defibrillator in situ    3. Cardiomyopathy, unspecified type ([address]    4. PVC's (premature ventricular contractions)    5. SOB (shortness of breath)         1. BiV ICD in situ->     2. IESHA->Sleep study completed 12/13/2017, positive for severe sleep apnea     3. AVB->Bi-V ICD in place      4. NICMP-> Compensated. Clinically improved with CRT-D, now solid NYHA II  MRI with no ischemia, fibrosis or infiltrative disease, has wall motion abnormalities    5. HTN: BP stable today    Impedance monitoring via [address] was downloaded from patient's ICD and reviewed. The impedance shows stable fluid level. Plan:  1. Refer to Dr Bairon Basilio to discuss treatment for severe sleep apnea  2. Decrease Toprol XL to 100mg nightly  3. CBC, BNP, CMP, lipids, Vit D  4. RTO 1 year      QUALITY MEASURES  1. Tobacco Cessation Counseling: N/A  2. Retake of BP if >140/90:   N/A  3. Documentation to PCP/referring for new patient:  Sent to PCP at close of office visit  4. CAD patient on anti-platelet: N/A  5. CAD patient on STATIN therapy: N/A  6. Patient with CHF and aFib on anticoagulation:  N/A    Scribe's attestation: This note was scribed in the presence of Dr Lanette Bo MD by Jason Okeefe RN. The scribe's documentation has been prepared under my direction and personally reviewed by me in its entirety.   I confirm that the note above accurately reflects all work, treatment, procedures, and medical decision making performed by me. I appreciate the opportunity of cooperating in the care of this patient. Scott Duffy M.D., [facility]            If you have questions, please do not hesitate to call me. I look forward to following Kimberly Castillo along with you.     Sincerely,        Umer Beckwith MD

## 2020-11-04 NOTE — PROGRESS NOTES
[address]  Advanced CHF/Pulmonary Hypertension   Cardiac Evaluation      Saúl Lima  YOB: 1960    Date of Visit: 11/4/20        Chief Complaint   Patient presents with    6 Month Follow-Up    Congestive Heart Failure        History of Present Illness:  Saúl Lima is a 64 y.o. male who presents today for follow up for systolic congestive heart failure. He has a H/o HTN, idiopathic CHB, s/p dual chamber Medtronic PPM (5/2014). Drop in EF with 100%  and s/p CRT-D upgrade. NYHA class II. He had a sleep study in 12/2017 and was found to have severe sleep apnea but he thinks that his insurance does not cover the treatment. He had been taking care of and lives with his mother who had vaginal cancer, but she has passed away. Echo repeated 10/6/20 revealed EF 50-55%. Today, Mr Nanette Partida states he has been fine. His main complaint is fatigue. He works for the [employer] and talks on the phone most of the day. He is not exercising routinely. Cam  denies any chest pain, palpitations, dizziness, or edema. He never did have sleep apnea treated due to insurance changes and unsure if covered. Allergies   Allergen Reactions    Doxycycline     Lisinopril Other (See Comments)     dizziness    Losartan     Septra [Sulfamethoxazole-Trimethoprim] Other (See Comments)     Pt states he breaks out    Sulfa Antibiotics      \"Breaks Out\"    Tetracyclines & Related      \"Breaks Out\"     Current Outpatient Medications   Medication Sig Dispense Refill    metoprolol succinate (TOPROL XL) 100 MG extended release tablet Take 1.5 tablets by mouth daily 135 tablet 3    valsartan (DIOVAN) 80 MG tablet Take 1 tablet by mouth daily 90 tablet 3    famotidine (PEPCID) 20 MG tablet Take 20 mg by mouth nightly as needed. No current facility-administered medications for this visit.         Past Medical History:   Diagnosis Date    Acid reflux     Bradycardia     Cardiomyopathy (HCC)     [facility]     [facility]ive [facility]) [facility]     Hypertension     ICD (implantable cardioverter-defibrillator) battery depletion 5/11/16    Dual chamber Medtronic - Dr. Tavo Ceballos     Past Surgical History:   Procedure Laterality Date    CARDIAC PACEMAKER PLACEMENT      COSMETIC SURGERY      PACEMAKER PLACEMENT      TONSILLECTOMY       Family History   Problem Relation Age of Onset    Diabetes Father     Arrhythmia Mother     Arthritis Mother      Social History     Socioeconomic History    Marital status: Single     Spouse name: Not on file    Number of children: Not on file    Years of education: Not on file    Highest education level: Not on file   Occupational History     Employer: [employer]     Comment: Corrects    Social Needs    Financial resource strain: Not on file    Food insecurity     Worry: Not on file     Inability: Not on file    Transportation needs     Medical: Not on file     Non-medical: Not on file   Tobacco Use    Smoking status: Never Smoker    Smokeless tobacco: Never Used   Substance and Sexual Activity    Alcohol use: No    Drug use: No    Sexual activity: Not on file     Comment: Engaged   Lifestyle    Physical activity     Days per week: Not on file     Minutes per session: Not on file    Stress: Not on file   Relationships    Social connections     Talks on phone: Not on file     Gets together: Not on file     Attends [employer] service: Not on file     Active member of club or organization: Not on file     Attends meetings of clubs or organizations: Not on file     Relationship status: Not on file    Intimate partner violence     Fear of current or ex partner: Not on file     Emotionally abused: Not on file     Physically abused: Not on file     Forced sexual activity: Not on file   Other Topics Concern    Not on file   Social History Narrative    Not on file       Review of Systems:   · Constitutional: there has been no unanticipated is  susceptibility artifact within the right atrium and right ventricle  related to pacemaker leads. There is no pericardial effusion. The  mitral valve and aortic valve are unremarkable.  -On resting perfusion images, no resting perfusion defects are  identified.  -On delayed myocardial enhancement sequences, there is no delayed  myocardial enhancement to suggest myocardial fibrosis or infiltrative  Process. Pacer Placement-8/17/2016 The device is [id number] with SN# [id number] Medtronic implant date: 5/11/2016   Atrial lead: #7733-78 with serial number# [id number] implant date: 5/13/2014  RV lead: # [id number]-70 with serial number# [id number] implant date: 5/11/2016   LV lead: #9600-86 with serial number# [id number] implant date: 8/17/2016  Atrial lead: Impedance: 418 Pacing threshold: 0.75 @ 0.4 ms sensing 3.0 mV  RV lead: Impedance: 399 Pacing threshold: 1 @ 0.4 ms sensing, no sable underlying  LV lead: Impedence 931 Pacing threshold 1.25 @ 0.4 (L1-L4)  Device was programmed to DDD with lower rate of 60 and upper tracking rate of 130.      Angio 2014: non obstructive    Echo 5/18  Left ventricular cavity size is normal. There is mild concentric left   ventricular hypertrophy. Overall left ventricular systolic function appears   low normal with an ejection fraction of 50-55%. No regional wall motion   abnormalities are noted other than septal bounce which may be related to   bundle branch block. Mild tricuspid regurgitation. Estimated pulmonary   artery systolic pressure is at 27 mmHg assuming a right atrial pressure of 3   mmHg. Labs were reviewed including labs from other hospital systems through [facility]. Cardiac testing was reviewed including echos, nuclear scans, cardiac catheterization, including from other hospital systems through [facility]. Assessment:    1. Chronic systolic heart failure ([address]    2. Biventricular automatic implantable cardioverter defibrillator in situ    3.

## 2020-11-04 NOTE — LETTER
[address] [place] [address] Cardiology [address]  [address] [address]  Phone: [phone]  Fax: [phone]    Richard Granados MD        2020    [address] [facility] [address]  : 64    To whom it may concern:    Mr José Dunn has a history of heart failure which makes him high risk if he were to contract Covid. It would be in his best interest if he could continue to work from home until getting a vaccine. If you have any questions or concerns, please don't hesitate to call.     Sincerely,        Richard Granados MD

## 2021-01-11 ENCOUNTER — NURSE ONLY (OUTPATIENT)
Dept: CARDIOLOGY CLINIC | Age: 61
End: 2021-01-11
Payer: COMMERCIAL

## 2021-01-11 DIAGNOSIS — Z95.810 BIVENTRICULAR AUTOMATIC IMPLANTABLE CARDIOVERTER DEFIBRILLATOR IN SITU: Chronic | ICD-10-CM

## 2021-01-11 DIAGNOSIS — I44.2 CHB (COMPLETE HEART BLOCK) (HCC): Chronic | ICD-10-CM

## 2021-01-11 DIAGNOSIS — I50.22 CHRONIC SYSTOLIC HEART FAILURE (HCC): Chronic | ICD-10-CM

## 2021-01-11 DIAGNOSIS — I42.9 CARDIOMYOPATHY, UNSPECIFIED TYPE (HCC): Chronic | ICD-10-CM

## 2021-01-11 DIAGNOSIS — R00.1 BRADYCARDIA: Chronic | ICD-10-CM

## 2021-01-11 PROCEDURE — 93296 REM INTERROG EVL PM/IDS: CPT | Performed by: INTERNAL MEDICINE

## 2021-01-11 PROCEDURE — 93297 REM INTERROG DEV EVAL ICPMS: CPT | Performed by: INTERNAL MEDICINE

## 2021-01-11 PROCEDURE — 93295 DEV INTERROG REMOTE 1/2/MLT: CPT | Performed by: INTERNAL MEDICINE

## 2021-01-11 PROCEDURE — G2066 INTER DEVC REMOTE 30D: HCPCS | Performed by: INTERNAL MEDICINE

## 2021-01-12 NOTE — PROGRESS NOTES
We received remote transmission from patient's monitor at home. Transmission shows normal sensing and pacing function. EP physician will review. See interrogation under cardiology tab in the [address] [place] [address] [address] field for more details. Thoracic impedance trend stable. Episodes Since: 17-Sep-2020  1 Non-sustained VT x 6 beats (toprol xl). Pacing (% of Time Since 17-Sep-2020)  Total  99.8% (MVP Off)  Follow up in 3 months via carelink.

## 2021-02-26 ENCOUNTER — TELEPHONE (OUTPATIENT)
Dept: CARDIOLOGY CLINIC | Age: 61
End: 2021-02-26

## 2021-02-26 NOTE — TELEPHONE ENCOUNTER
Asking if JOSTIN would write him a note excusing him from jury duty . He is afraid to go to jury duty because he is high risk for covid. Please call him to let him know when he can pick note up .

## 2021-02-26 NOTE — LETTER
[address] Cardiology [place] [address] [address] [address]  Phone: [phone]  Fax: [phone]    Sahara Arevalo MD        March 1, 2021     Patient: Pavan Schroeder   YOB: 1960   Date of Visit: 2/26/2021       To Whom It May Concern: It is my medical opinion that Anahi Kahn cannot participate in [employer]. He has chronic heart failure, on diuretics with frequent bathroom breaks which will effect his ability to be on [employer]. If you have any questions or concerns, please don't hesitate to call.     Sincerely,        Sahara Arevalo MD

## 2021-03-01 ENCOUNTER — TELEPHONE (OUTPATIENT)
Dept: CARDIOLOGY CLINIC | Age: 61
End: 2021-03-01

## 2021-03-01 NOTE — TELEPHONE ENCOUNTER
Can you give him jury duty letter for him? And ask him if he wants us to help him get a vaccine. Needs to be off due to chronic heart failure, on diuretics with frequent bathroom breaks.

## 2021-04-12 ENCOUNTER — NURSE ONLY (OUTPATIENT)
Dept: CARDIOLOGY CLINIC | Age: 61
End: 2021-04-12
Payer: COMMERCIAL

## 2021-04-12 DIAGNOSIS — I42.9 CARDIOMYOPATHY, UNSPECIFIED TYPE (HCC): Chronic | ICD-10-CM

## 2021-04-12 DIAGNOSIS — R00.1 BRADYCARDIA: Chronic | ICD-10-CM

## 2021-04-12 DIAGNOSIS — I44.2 CHB (COMPLETE HEART BLOCK) (HCC): Chronic | ICD-10-CM

## 2021-04-12 DIAGNOSIS — I50.22 CHRONIC SYSTOLIC HEART FAILURE (HCC): Chronic | ICD-10-CM

## 2021-04-12 DIAGNOSIS — Z95.810 BIVENTRICULAR AUTOMATIC IMPLANTABLE CARDIOVERTER DEFIBRILLATOR IN SITU: Chronic | ICD-10-CM

## 2021-04-12 NOTE — PROGRESS NOTES
We received remote transmission from patient's monitor at home. Transmission shows normal sensing and pacing function. EP physician will review. See interrogation under cardiology tab in the [address] [place] [address] [address] field for more details. No  arrhythmias recorded. Thoracic impedance trend stable. Follow up in 3 months via carelink.

## 2021-05-03 PROCEDURE — 93297 REM INTERROG DEV EVAL ICPMS: CPT | Performed by: INTERNAL MEDICINE

## 2021-05-03 PROCEDURE — 93295 DEV INTERROG REMOTE 1/2/MLT: CPT | Performed by: INTERNAL MEDICINE

## 2021-05-03 PROCEDURE — 93296 REM INTERROG EVL PM/IDS: CPT | Performed by: INTERNAL MEDICINE

## 2021-07-12 ENCOUNTER — NURSE ONLY (OUTPATIENT)
Dept: CARDIOLOGY CLINIC | Age: 61
End: 2021-07-12
Payer: COMMERCIAL

## 2021-07-12 DIAGNOSIS — Z95.810 BIVENTRICULAR AUTOMATIC IMPLANTABLE CARDIOVERTER DEFIBRILLATOR IN SITU: Chronic | ICD-10-CM

## 2021-07-12 DIAGNOSIS — I50.22 CHRONIC SYSTOLIC HEART FAILURE (HCC): Chronic | ICD-10-CM

## 2021-07-12 DIAGNOSIS — I42.9 CARDIOMYOPATHY, UNSPECIFIED TYPE (HCC): Chronic | ICD-10-CM

## 2021-07-12 DIAGNOSIS — I44.2 CHB (COMPLETE HEART BLOCK) (HCC): Chronic | ICD-10-CM

## 2021-07-12 NOTE — PROGRESS NOTES
We received remote transmission from patient's CRT-D monitor at home. Transmission shows normal sensing and pacing function. No new arrhythmias/events recorded. Ap 2.4%  BiVp 100%     Optivol is within normal range. EP physician will review. See interrogation under cardiology tab in the [address] [place] [address] [address] field for more details.

## 2021-07-12 NOTE — LETTER
[address] [facility] [phone] [address] [phone]    Pacemaker/Defibrillator Clinic    07/12/21      Asa Jones  [address] [facility] [address]      Dear Asa Jones    This letter is to inform you that we received the transmission from your monitor at home that checks your implanted heart device. The next date your monitor will automatically transmit will be 10/11. If your report needs attention we will notify you. Your device and monitor are wireless and most transmit cellularly, but please periodically check your monitor is still plugged in to the electrical outlet. If you still use the telephone land line to send please ensure the connection to the phone dai is secure. This will help to ensure successful automatic transmissions in the future. Also, the monitor needs to be close to you while sleeping at night. Please be aware that the remote device transmission sites are periodically monitored only during regular business hours during which simultaneous in-office device clinics are being run. If your transmission requires attention, we will contact you as soon as possible. **PLEASE NOTE** that our [facility] policy and processes are changing to ensure a more seamless approach for all parties involved, allowing more time for our nurses to address patient issues and concerns. We will no longer be sending letters for NORMAL remote transmissions. You will be contacted by phone if your transmission requires attention (as previously done), and letters will only be sent regarding monitor disconnections or missed transmissions if you are unable to be reached by phone. Please do not be alarmed by this new process, as we will continue to contact you if your transmission report requires attention. This will be your final \"remote received\" letter.   From this point forward, the [facility] will be utilizing the no news is good news approach. As always, please feel free to contact your nurse with any questions or concerns. Thank you.     [facility], [place], operated [facility]

## 2021-07-13 PROCEDURE — 93297 REM INTERROG DEV EVAL ICPMS: CPT | Performed by: INTERNAL MEDICINE

## 2021-07-13 PROCEDURE — 93296 REM INTERROG EVL PM/IDS: CPT | Performed by: INTERNAL MEDICINE

## 2021-07-13 PROCEDURE — 93295 DEV INTERROG REMOTE 1/2/MLT: CPT | Performed by: INTERNAL MEDICINE

## 2021-09-20 ENCOUNTER — TELEPHONE (OUTPATIENT)
Dept: CARDIOLOGY CLINIC | Age: 61
End: 2021-09-20

## 2021-09-20 RX ORDER — METOPROLOL SUCCINATE 100 MG/1
100 TABLET, EXTENDED RELEASE ORAL DAILY
Qty: 90 TABLET | Refills: 3 | Status: SHIPPED | OUTPATIENT
Start: 2021-09-20 | End: 2022-09-12 | Stop reason: SDUPTHER

## 2021-09-20 RX ORDER — METOPROLOL SUCCINATE 100 MG/1
150 TABLET, EXTENDED RELEASE ORAL DAILY
Qty: 135 TABLET | Refills: 3 | OUTPATIENT
Start: 2021-09-20

## 2021-09-20 NOTE — PROGRESS NOTES
[facility]   Electrophysiology  Office Visit  Date: 9/21/2021    Chief Complaint   Patient presents with    Cardiomyopathy    Congestive Heart Failure    Hypertension    Bradycardia     Cardiac HX: Danyelle Peguero is a 63 y.o. man with a h/o HTN, high degree AV block, s/p dual ch MDT PPM (5/2014, Dr. Chandana Fisher), EF had been 55%, 100%  burden, developed NICMP, s/p upgrade to an ICD (5/11/2016), unable to place LV lead d/t inability to cannulate the CS, reattempt with an upgrade to a CRT device (8/17/2016, Dr. Chandana Fisher), increased PVCs and NSVT noted on device. Interval History/HPI: Patient is here for follow-up for NICMP, PVCs and ICD management. Patient with a longstanding history of high degree AV block for which he was implanted with a dual-chamber permanent pacemaker in 2014. Originally his EF had been 55% however after pacing 100% in the ventricle he developed a nonischemic cardiomyopathy. He underwent an attempt to an upgrade to a CRT D in May 2016 however Dr. Chandana Fisher was unable to cannulate the CS and he was implanted with a dual-chamber ICD. He underwent a second attempt to an upgrade to a CRT-D on 8/17/2016. His last echo showed his EF had improved to 50 to 55%. He continues to complain of fatigue and shortness of breath. He feels that it is related to his job. He works for the [employer] and states it is very stressful. 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 He denies any chest pain, PND, orthopnea or lower extremity edema. His weight is down and he has lost 10 pounds over the last 6 months. Review of his device today shows that he paces 3.4% of the time in the atrium and 99% of the time in the ventricle. His OptiVol is within normal limits. He has had 2 nonsustained VT episodes 1 which appears to be a slow VT lasting about 6 seconds in length. Cath in 2014 showed no significant CAD. He denies heart racing, palpitations or irregular heartbeats. He denies any dizziness, lightheadedness or syncopal events. His blood pressure is well controlled in the office today. Home medications:   Current Outpatient Medications on File Prior to Visit   Medication Sig Dispense Refill    metoprolol succinate (TOPROL XL) 100 MG extended release tablet Take 1 tablet by mouth daily 90 tablet 3    valsartan (DIOVAN) 80 MG tablet Take 1 tablet by mouth daily 90 tablet 3    famotidine (PEPCID) 20 MG tablet Take 20 mg by mouth nightly as needed. No current facility-administered medications on file prior to visit. Past Medical History:   Diagnosis Date    Acid reflux     Bradycardia     Cardiomy[facility]     [facility]     CHF (congestive heart failure) ([facility]     Hypertension     ICD (implantable cardioverter-defibrillator) battery depletion 5/11/16    Dual chamber Medtronic - Dr. Luis Goddard        Past Surgical History:   Procedure Laterality Date    CARDIAC PACEMAKER PLACEMENT      COSMETIC SURGERY      PACEMAKER PLACEMENT      TONSILLECTOMY         Allergies   Allergen Reactions    Doxycycline     Lisinopril Other (See Comments)     dizziness    Losartan     Septra [Sulfamethoxazole-Trimethoprim] Other (See Comments)     Pt states he breaks out    Sulfa Antibiotics      \"Breaks Out\"    Tetracyclines & Related      \"Breaks Out\"       Social History:  Reviewed. reports that he has never smoked. He has never used smokeless tobacco. He reports that he does not drink alcohol and does not use drugs. Family History:  Reviewed. family history includes Arrhythmia in his mother; Arthritis in his mother; Diabetes in his father. Review of System:    · Constitutional: No fevers, chills. · Eyes: No visual changes or diplopia. No scleral icterus. · ENT: No Headaches. No mouth sores or sore throat. · Cardiovascular: No for chest pain, No for dyspnea on exertion, No for palpitations or No for loss of consciousness.  No cough, hemoptysis, No for pleuritic pain, or phlebitis. · Respiratory: No for cough or wheezing. No hematemesis. · Gastrointestinal: No abdominal pain, blood in stools. · Genitourinary: No dysuria, or hematuria. · Musculoskeletal: No gait disturbance,    · Integumentary: No rash or pruritis. · Neurological: No headache, change in muscle strength, numbness or tingling. · Psychiatric: No anxiety, or depression. · Endocrine: No temperature intolerance. No excessive thirst, fluid intake, or urination. · Hem/Lymph: No abnormal bruising or bleeding, blood clots or swollen lymph nodes. · Allergic/Immunologic: No nasal congestion or hives. Physical Examination:  Vitals:    09/21/21 1506   BP: 110/80   Pulse: 71         Wt Readings from Last 3 Encounters:   09/21/21 218 lb 3.2 oz (99 kg)   11/04/20 228 lb (103.4 kg)   09/17/20 229 lb 6.4 oz (104.1 kg)       · Constitutional: Oriented. No distress. · Head: Normocephalic and atraumatic. · Mouth/Throat: Oropharynx is clear and moist.   · Eyes: Conjunctivae clear without jaunduice. PERRL. · Neck: Neck supple. No rigidity. No JVD present. · Cardiovascular: Normal rate, regular rhythm, S1&S2. · Pulmonary/Chest: Bilateral respiratory sounds. No wheezes, No rhonchi. · Abdominal: Soft. Bowel sounds present. No distension, No tenderness. · Musculoskeletal: No tenderness. No edema    · Lymphadenopathy: Has no cervical adenopathy. · Neurological: Alert and oriented. Cranial nerve appears intact, No Gross deficit   · Skin: Skin is warm and dry. No rash noted. · Psychiatric: Has a normal mood, affect and behavior     Labs:  Reviewed. No results for input(s): NA, K, CL, CO2, PHOS, BUN, CREATININE in the last 72 hours. Invalid input(s): CA,  TSH  No results for input(s): WBC, HGB, HCT, MCV, PLT in the last 72 hours.   Lab Results   Component Value Date    TROPONINI 0.02 05/27/2014     Lab Results   Component Value Date    .0 05/27/2014    .0 05/12/2014     Lab Results   Component Value Date    PROTIME 11.5 08/11/2016    PROTIME 10.8 05/06/2016    PROTIME 11.2 05/27/2014    INR 1.01 08/11/2016    INR 0.95 05/06/2016    INR 1.04 05/27/2014     Lab Results   Component Value Date    CHOL 153 10/20/2017    HDL 39 10/20/2017    TRIG 128 10/20/2017       ECG: Personally reviewed: AS, , HR 71, , , QTc 443    ECHO: 10.6.2020   Summary   Normal left ventricular size. Mild concentric left ventricular hypertrophy. Left ventricular systolic function appears at the lower limits of normal   with an estimated ejection fraction of 50%-55%. Septal bounce likely due to pacing apparatus . Normal diastolic function. Normal right ventricular size with decreased systolic function. Pacer / ICD wire is visualized in the right ventricle. Aortic valve appears sclerotic but opens adequately. Trivial tricuspid regurgitation. Estimated pulmonary artery systolic pressure is at 20 mmHg assuming a right   atrial pressure of 3 mmHg. Stress Test: n/a    Cardiac Angiography: 2014 Dr. Debbie Calvillo  Angiographic Findings:  Right dominant system  Left Main:  Absent.  2 separate ostia for the LAD and Lcx.   Left Anterior Descending:  No angiographic obstructive disease.   Circumflex:  No angiographic obstructive disease.   Right Coronary:  No angiographic obstructive disease.   Left Ventriculogram:  Not performed; had echo.   Hemodynamics (mm Hg):  Left Ventricular Pressure:  203/5, 25  Central Aortic Pressure:  200/75   Conclusions:  No significant CAD   Recommendations:  -Pacemaker/Heart block management per Dr. Momo oMnge    Problem List:   Patient Active Problem List    Diagnosis Date Noted    Biventricular automatic implantable cardioverter defibrillator in situ 06/06/2017    Obesity (BMI 30.0-34.9) 08/24/2016    Cardiomyopathy ([address]     Chronic systolic heart failure ([address]     HTN (hypertension) 05/27/2014    CHB (complete heart block) ([address] 05/14/2014    Bradycardia 05/12/2014    SOB (shortness of breath) 05/12/2014    Obstructive sleep apnea syndrome         Assessment:   1. Bradycardia    2. CHB (complete heart block) (HCC)    3. ICD (implantable cardioverter-defibrillator), biventricular, in situ    4. NICM (nonischemic cardiomyopathy) ([facility]    5. Chronic systolic CHF (congestive heart failure) ([facility]    6. PVC's (premature ventricular contractions)        Cardiac HX: Bladimir Bentley is a 63 y.o. man with a h/o HTN, high degree AV block, s/p dual ch MDT PPM (5/2014, Dr. Grayson Begum), EF had been 55%, 100%  burden, developed NICMP, s/p upgrade to an ICD (5/11/2016), unable to place LV lead d/t inability to cannulate the CS, reattempt with an upgrade to a CRT device (8/17/2016, Dr. Grayson Begum), increased PVCs and NSVT noted on device. NICMP/chronic sCHF/CHB  - Appears euvolemic  - EF 55%  - NYHA class I  -  paced  - On Toprol- mg daily, valsartan 80 mg daily  - S/p MDT CRT-D  - Device check today shows 3.4% AP, 99% , 2 NSVT episodes, longest 6 seconds in length    PVC  - Asymptomatic  - 2 NSVT episodes on device - longest 6 seconds and appears to be a slow VT  - ON Toprol  mg  - PVC count on device check showed 4.5/h. EF of 60-46%  H/o  systolic HF  ASA and Statin for CAD  Anticoagulation for AF and heart failure  No Tobacco use. All questions and concerns were addressed to the patient/family. Alternatives to my treatment were discussed. The note was completed using EMR. Every effort was made to ensure accuracy; however, inadvertent computerized transcription errors may be present. Patient received education regarding their diagnosis, treatment and medications while in the office today. Dereje Pederson CNP  [address]         I  have spent 40 minutes in care of the patient including direct face to face time, chart preparation, reviewing diagnostic testing, other provider notes and coordinating patient care.

## 2021-09-20 NOTE — TELEPHONE ENCOUNTER
Requested Prescriptions     Pending Prescriptions Disp Refills    metoprolol succinate (TOPROL XL) 100 MG extended release tablet 90 tablet 3     Sig: Take 1 tablet by mouth daily                  Last Office Visit: 9/17/2020     Next Office Visit: 10/11/2021

## 2021-09-20 NOTE — TELEPHONE ENCOUNTER
Requested Prescriptions     Pending Prescriptions Disp Refills    metoprolol succinate (TOPROL XL) 100 MG extended release tablet 135 tablet 3     Sig: Take 1.5 tablets by mouth daily          Number: 135    Refills: 3    Last Office Visit: 9/17/2020     Next Office Visit: 10/11/2021     Last Labs: 09.11.1030

## 2021-09-21 ENCOUNTER — OFFICE VISIT (OUTPATIENT)
Dept: CARDIOLOGY CLINIC | Age: 61
End: 2021-09-21
Payer: COMMERCIAL

## 2021-09-21 ENCOUNTER — NURSE ONLY (OUTPATIENT)
Dept: CARDIOLOGY CLINIC | Age: 61
End: 2021-09-21
Payer: COMMERCIAL

## 2021-09-21 VITALS
HEIGHT: 70 IN | DIASTOLIC BLOOD PRESSURE: 80 MMHG | WEIGHT: 218.2 LBS | SYSTOLIC BLOOD PRESSURE: 110 MMHG | HEART RATE: 71 BPM | BODY MASS INDEX: 31.24 KG/M2

## 2021-09-21 DIAGNOSIS — I42.9 CARDIOMYOPATHY, UNSPECIFIED TYPE (HCC): Chronic | ICD-10-CM

## 2021-09-21 DIAGNOSIS — R00.1 BRADYCARDIA: Primary | ICD-10-CM

## 2021-09-21 DIAGNOSIS — I49.3 PVC'S (PREMATURE VENTRICULAR CONTRACTIONS): ICD-10-CM

## 2021-09-21 DIAGNOSIS — I44.2 CHB (COMPLETE HEART BLOCK) (HCC): ICD-10-CM

## 2021-09-21 DIAGNOSIS — I44.2 CHB (COMPLETE HEART BLOCK) (HCC): Chronic | ICD-10-CM

## 2021-09-21 DIAGNOSIS — Z95.810 ICD (IMPLANTABLE CARDIOVERTER-DEFIBRILLATOR), BIVENTRICULAR, IN SITU: ICD-10-CM

## 2021-09-21 DIAGNOSIS — Z95.810 BIVENTRICULAR AUTOMATIC IMPLANTABLE CARDIOVERTER DEFIBRILLATOR IN SITU: Chronic | ICD-10-CM

## 2021-09-21 DIAGNOSIS — I50.22 CHRONIC SYSTOLIC CHF (CONGESTIVE HEART FAILURE) (HCC): ICD-10-CM

## 2021-09-21 DIAGNOSIS — I42.8 NICM (NONISCHEMIC CARDIOMYOPATHY) (HCC): ICD-10-CM

## 2021-09-21 DIAGNOSIS — R00.1 BRADYCARDIA: Chronic | ICD-10-CM

## 2021-09-21 DIAGNOSIS — I50.22 CHRONIC SYSTOLIC HEART FAILURE (HCC): Chronic | ICD-10-CM

## 2021-09-21 PROCEDURE — 99215 OFFICE O/P EST HI 40 MIN: CPT | Performed by: NURSE PRACTITIONER

## 2021-09-22 PROCEDURE — 93288 INTERROG EVL PM/LDLS PM IP: CPT | Performed by: INTERNAL MEDICINE

## 2021-09-22 PROCEDURE — 93290 INTERROG DEV EVAL ICPMS IP: CPT | Performed by: INTERNAL MEDICINE

## 2021-09-22 NOTE — PROGRESS NOTES
Patient comes in for programming evaluation on their Medtronic CRT-D. All sensing and pacing parameters are within normal range. Battery life 2.1 years. AP 3.4%.  99.9%. Underlying Dependent at 30 bpm  Since 9/17/2020-1 non-sustained VT on 10/2020 x 03 secs. 4 VSR episodes. Recent on 8/15/2021, appears non-sustained x 06 secs. Patient remains on metoprolol. No changes need to be made at this time. Please see interrogation for more detail. Optivol is within normal range. Patient will see NPFW today and follow up in 3 months in office or remotely.

## 2021-10-01 ENCOUNTER — TELEPHONE (OUTPATIENT)
Dept: CARDIOLOGY CLINIC | Age: 61
End: 2021-10-01

## 2021-10-01 NOTE — TELEPHONE ENCOUNTER
He is a [employer] employee and he is being forced to take the covid vaccine by 11/22/21. He has been working from home and has not been around people so far . He wants to know if JOSTIN thinks it is ok for him to get he vaccine considering his medical condition? If JSOTIN thinks he is safe to get the vaccine is there one of them that she prefers?

## 2021-10-07 ENCOUNTER — TELEPHONE (OUTPATIENT)
Dept: CARDIOLOGY CLINIC | Age: 61
End: 2021-10-07

## 2021-10-07 ENCOUNTER — APPOINTMENT (OUTPATIENT)
Dept: GENERAL RADIOLOGY | Age: 61
End: 2021-10-07
Payer: COMMERCIAL

## 2021-10-07 ENCOUNTER — HOSPITAL ENCOUNTER (EMERGENCY)
Age: 61
Discharge: HOME | End: 2021-10-08
Attending: EMERGENCY MEDICINE
Payer: COMMERCIAL

## 2021-10-07 VITALS
DIASTOLIC BLOOD PRESSURE: 67 MMHG | RESPIRATION RATE: 19 BRPM | TEMPERATURE: 98.6 F | SYSTOLIC BLOOD PRESSURE: 120 MMHG | OXYGEN SATURATION: 96 % | HEART RATE: 78 BPM

## 2021-10-07 DIAGNOSIS — L02.416 ABSCESS OF LEFT LEG: ICD-10-CM

## 2021-10-07 DIAGNOSIS — Z20.822 PERSON UNDER INVESTIGATION FOR COVID-19: ICD-10-CM

## 2021-10-07 DIAGNOSIS — E86.0 DEHYDRATION: ICD-10-CM

## 2021-10-07 DIAGNOSIS — B34.9 VIRAL SYNDROME: Primary | ICD-10-CM

## 2021-10-07 DIAGNOSIS — R73.9 HYPERGLYCEMIA: ICD-10-CM

## 2021-10-07 LAB
ANION GAP SERPL CALCULATED.3IONS-SCNC: 15 MMOL/L (ref 3–16)
BANDED NEUTROPHILS RELATIVE PERCENT: 1 % (ref 0–7)
BASOPHILS ABSOLUTE: 0 K/UL (ref 0–0.2)
BASOPHILS RELATIVE PERCENT: 0 %
BILIRUBIN URINE: NEGATIVE
BLOOD, URINE: NEGATIVE
BUN BLDV-MCNC: 14 MG/DL (ref 7–20)
CALCIUM SERPL-MCNC: 8.6 MG/DL (ref 8.3–10.6)
CHLORIDE BLD-SCNC: 98 MMOL/L (ref 99–110)
CLARITY: CLEAR
CO2: 18 MMOL/L (ref 21–32)
COLOR: YELLOW
CREAT SERPL-MCNC: 0.8 MG/DL (ref 0.9–1.3)
EOSINOPHILS ABSOLUTE: 0 K/UL (ref 0–0.6)
EOSINOPHILS RELATIVE PERCENT: 0 %
GFR AFRICAN AMERICAN: >60
GFR NON-AFRICAN AMERICAN: >60
GLUCOSE BLD-MCNC: 256 MG/DL (ref 70–99)
GLUCOSE BLD-MCNC: 288 MG/DL (ref 70–99)
GLUCOSE URINE: >=1000 MG/DL
HCT VFR BLD CALC: 43.5 % (ref 40.5–52.5)
HEMOGLOBIN: 15.2 G/DL (ref 13.5–17.5)
KETONES, URINE: 40 MG/DL
LEUKOCYTE ESTERASE, URINE: NEGATIVE
LYMPHOCYTES ABSOLUTE: 0.5 K/UL (ref 1–5.1)
LYMPHOCYTES RELATIVE PERCENT: 16 %
MCH RBC QN AUTO: 31.1 PG (ref 26–34)
MCHC RBC AUTO-ENTMCNC: 35 G/DL (ref 31–36)
MCV RBC AUTO: 89.1 FL (ref 80–100)
MICROSCOPIC EXAMINATION: YES
MONOCYTES ABSOLUTE: 0.4 K/UL (ref 0–1.3)
MONOCYTES RELATIVE PERCENT: 12 %
MUCUS: ABNORMAL /LPF
NEUTROPHILS ABSOLUTE: 2.3 K/UL (ref 1.7–7.7)
NEUTROPHILS RELATIVE PERCENT: 71 %
NITRITE, URINE: NEGATIVE
PDW BLD-RTO: 12.7 % (ref 12.4–15.4)
PERFORMED ON: ABNORMAL
PH UA: 5.5 (ref 5–8)
PLATELET # BLD: 78 K/UL (ref 135–450)
PLATELET SLIDE REVIEW: ABNORMAL
PMV BLD AUTO: 7.8 FL (ref 5–10.5)
POTASSIUM REFLEX MAGNESIUM: 4.2 MMOL/L (ref 3.5–5.1)
PROTEIN UA: ABNORMAL MG/DL
RBC # BLD: 4.89 M/UL (ref 4.2–5.9)
RBC UA: ABNORMAL /HPF (ref 0–4)
SODIUM BLD-SCNC: 131 MMOL/L (ref 136–145)
SPECIFIC GRAVITY UA: >=1.03 (ref 1–1.03)
URINE TYPE: ABNORMAL
UROBILINOGEN, URINE: 0.2 E.U./DL
WBC # BLD: 3.2 K/UL (ref 4–11)
WBC UA: ABNORMAL /HPF (ref 0–5)

## 2021-10-07 PROCEDURE — 6360000002 HC RX W HCPCS: Performed by: PHYSICIAN ASSISTANT

## 2021-10-07 PROCEDURE — 81001 URINALYSIS AUTO W/SCOPE: CPT

## 2021-10-07 PROCEDURE — 87205 SMEAR GRAM STAIN: CPT

## 2021-10-07 PROCEDURE — 71045 X-RAY EXAM CHEST 1 VIEW: CPT

## 2021-10-07 PROCEDURE — 10061 I&D ABSCESS COMP/MULTIPLE: CPT

## 2021-10-07 PROCEDURE — 96374 THER/PROPH/DIAG INJ IV PUSH: CPT

## 2021-10-07 PROCEDURE — 96361 HYDRATE IV INFUSION ADD-ON: CPT

## 2021-10-07 PROCEDURE — 87077 CULTURE AEROBIC IDENTIFY: CPT

## 2021-10-07 PROCEDURE — 2500000003 HC RX 250 WO HCPCS: Performed by: PHYSICIAN ASSISTANT

## 2021-10-07 PROCEDURE — 87070 CULTURE OTHR SPECIMN AEROBIC: CPT

## 2021-10-07 PROCEDURE — 83036 HEMOGLOBIN GLYCOSYLATED A1C: CPT

## 2021-10-07 PROCEDURE — 85025 COMPLETE CBC W/AUTO DIFF WBC: CPT

## 2021-10-07 PROCEDURE — 99285 EMERGENCY DEPT VISIT HI MDM: CPT

## 2021-10-07 PROCEDURE — 2580000003 HC RX 258: Performed by: PHYSICIAN ASSISTANT

## 2021-10-07 PROCEDURE — 6370000000 HC RX 637 (ALT 250 FOR IP): Performed by: PHYSICIAN ASSISTANT

## 2021-10-07 PROCEDURE — 36415 COLL VENOUS BLD VENIPUNCTURE: CPT

## 2021-10-07 PROCEDURE — 96375 TX/PRO/DX INJ NEW DRUG ADDON: CPT

## 2021-10-07 PROCEDURE — 80048 BASIC METABOLIC PNL TOTAL CA: CPT

## 2021-10-07 RX ORDER — LIDOCAINE HYDROCHLORIDE AND EPINEPHRINE 10; 10 MG/ML; UG/ML
20 INJECTION, SOLUTION INFILTRATION; PERINEURAL ONCE
Status: COMPLETED | OUTPATIENT
Start: 2021-10-07 | End: 2021-10-07

## 2021-10-07 RX ORDER — KETOROLAC TROMETHAMINE 30 MG/ML
15 INJECTION, SOLUTION INTRAMUSCULAR; INTRAVENOUS ONCE
Status: COMPLETED | OUTPATIENT
Start: 2021-10-07 | End: 2021-10-07

## 2021-10-07 RX ORDER — ONDANSETRON 4 MG/1
4 TABLET, ORALLY DISINTEGRATING ORAL EVERY 8 HOURS PRN
Qty: 20 TABLET | Refills: 0 | Status: SHIPPED | OUTPATIENT
Start: 2021-10-07 | End: 2022-01-06 | Stop reason: ALTCHOICE

## 2021-10-07 RX ORDER — SODIUM CHLORIDE, SODIUM LACTATE, POTASSIUM CHLORIDE, CALCIUM CHLORIDE 600; 310; 30; 20 MG/100ML; MG/100ML; MG/100ML; MG/100ML
1000 INJECTION, SOLUTION INTRAVENOUS ONCE
Status: COMPLETED | OUTPATIENT
Start: 2021-10-07 | End: 2021-10-07

## 2021-10-07 RX ORDER — MELOXICAM 15 MG/1
7.5 TABLET ORAL 2 TIMES DAILY
Qty: 10 TABLET | Refills: 0 | Status: SHIPPED | OUTPATIENT
Start: 2021-10-07 | End: 2021-11-10 | Stop reason: ALTCHOICE

## 2021-10-07 RX ORDER — CLINDAMYCIN HYDROCHLORIDE 300 MG/1
300 CAPSULE ORAL 4 TIMES DAILY
Qty: 28 CAPSULE | Refills: 0 | Status: SHIPPED | OUTPATIENT
Start: 2021-10-07 | End: 2021-10-14

## 2021-10-07 RX ORDER — CLINDAMYCIN HYDROCHLORIDE 300 MG/1
300 CAPSULE ORAL ONCE
Status: COMPLETED | OUTPATIENT
Start: 2021-10-07 | End: 2021-10-07

## 2021-10-07 RX ORDER — ONDANSETRON 2 MG/ML
4 INJECTION INTRAMUSCULAR; INTRAVENOUS EVERY 30 MIN PRN
Status: DISCONTINUED | OUTPATIENT
Start: 2021-10-07 | End: 2021-10-08 | Stop reason: HOSPADM

## 2021-10-07 RX ADMIN — ONDANSETRON 4 MG: 2 INJECTION INTRAMUSCULAR; INTRAVENOUS at 20:53

## 2021-10-07 RX ADMIN — LIDOCAINE HYDROCHLORIDE,EPINEPHRINE BITARTRATE 20 ML: 10; .01 INJECTION, SOLUTION INFILTRATION; PERINEURAL at 22:08

## 2021-10-07 RX ADMIN — KETOROLAC TROMETHAMINE 15 MG: 30 INJECTION, SOLUTION INTRAMUSCULAR at 21:00

## 2021-10-07 RX ADMIN — SODIUM CHLORIDE, POTASSIUM CHLORIDE, SODIUM LACTATE AND CALCIUM CHLORIDE 1000 ML: 600; 310; 30; 20 INJECTION, SOLUTION INTRAVENOUS at 21:03

## 2021-10-07 RX ADMIN — CLINDAMYCIN HYDROCHLORIDE 300 MG: 300 CAPSULE ORAL at 23:19

## 2021-10-07 ASSESSMENT — PAIN SCALES - GENERAL: PAINLEVEL_OUTOF10: 8

## 2021-10-07 NOTE — ED PROVIDER NOTES
ED Attending Attestation Note     Date of evaluation: 10/7/2021    This patient was seen by the advance practice provider. I agree with the workup, evaluation, management and diagnosis. The care plan has been discussed. My assessment reveals patient aches and fatigue following COVID vaccine. Benign exam but found to have abscess requiring I&D. Leukopenia on lab eval.  CXR negative.  Suspect COVID-19 vs. Vaccine side effects and will test and manage expectantly given stable Tan Banda MD  10/09/21 3512

## 2021-10-07 NOTE — TELEPHONE ENCOUNTER
Left message on machine for patient. Advised that he could take ibuprofen alternating with Tylenol. If symptoms persist then he should follow-up with his primary care physician.

## 2021-10-07 NOTE — ED NOTES
Bed: A04-04  Expected date: 10/7/21  Expected time: 5:57 PM  Means of arrival:   Comments:  [address] [place] Mackenzie  10/07/21 1122

## 2021-10-07 NOTE — TELEPHONE ENCOUNTER
Patient would like to speak with Ruiz Ledemario. He got the Moderna vaccine Monday and is having headache, muscle aches and slight chest pain.  He is aware these might be side effects but would like to touch base with cherie, he also wants to know hat else he can take other then ibuprofen      [phone]

## 2021-10-07 NOTE — ED TRIAGE NOTES
Patient presents to the ED, states he received the [facility] Vaccine Monday (First Dose) and since then he has had fatigue, and generalized body aches. He states that he may have had some sick contacts but has no confirmation of such. Patient placed on CMU, VSS at this time.

## 2021-10-08 ENCOUNTER — VIRTUAL VISIT (OUTPATIENT)
Dept: INTERNAL MEDICINE CLINIC | Age: 61
End: 2021-10-08
Payer: COMMERCIAL

## 2021-10-08 DIAGNOSIS — M79.10 MYALGIA: ICD-10-CM

## 2021-10-08 DIAGNOSIS — L02.429 BOIL OF LOWER EXTREMITY: ICD-10-CM

## 2021-10-08 DIAGNOSIS — E11.9 TYPE 2 DIABETES MELLITUS WITHOUT COMPLICATION, WITHOUT LONG-TERM CURRENT USE OF INSULIN (HCC): ICD-10-CM

## 2021-10-08 LAB
ESTIMATED AVERAGE GLUCOSE: 292 MG/DL
HBA1C MFR BLD: 11.8 %
SARS-COV-2: DETECTED

## 2021-10-08 PROCEDURE — U0005 INFEC AGEN DETEC AMPLI PROBE: HCPCS

## 2021-10-08 PROCEDURE — 6370000000 HC RX 637 (ALT 250 FOR IP): Performed by: EMERGENCY MEDICINE

## 2021-10-08 PROCEDURE — 99204 OFFICE O/P NEW MOD 45 MIN: CPT | Performed by: INTERNAL MEDICINE

## 2021-10-08 PROCEDURE — U0003 INFECTIOUS AGENT DETECTION BY NUCLEIC ACID (DNA OR RNA); SEVERE ACUTE RESPIRATORY SYNDROME CORONAVIRUS 2 (SARS-COV-2) (CORONAVIRUS DISEASE [COVID-19]), AMPLIFIED PROBE TECHNIQUE, MAKING USE OF HIGH THROUGHPUT TECHNOLOGIES AS DESCRIBED BY CMS-2020-01-R: HCPCS

## 2021-10-08 RX ORDER — CALCIUM CITRATE/VITAMIN D3 200MG-6.25
1 TABLET ORAL 2 TIMES DAILY
Qty: 50 EACH | Refills: 5 | Status: SHIPPED | OUTPATIENT
Start: 2021-10-08 | End: 2021-11-10 | Stop reason: SDUPTHER

## 2021-10-08 RX ORDER — ACETAMINOPHEN 325 MG/1
650 TABLET ORAL ONCE
Status: COMPLETED | OUTPATIENT
Start: 2021-10-08 | End: 2021-10-08

## 2021-10-08 RX ORDER — BLOOD-GLUCOSE METER
EACH MISCELLANEOUS
Qty: 1 EACH | Refills: 0 | Status: SHIPPED | OUTPATIENT
Start: 2021-10-08

## 2021-10-08 RX ORDER — BACLOFEN 20 MG
500 TABLET ORAL DAILY
Qty: 30 TABLET | Refills: 0 | Status: SHIPPED | OUTPATIENT
Start: 2021-10-08 | End: 2022-01-06 | Stop reason: ALTCHOICE

## 2021-10-08 RX ORDER — ONDANSETRON 4 MG/1
4 TABLET, ORALLY DISINTEGRATING ORAL ONCE
Status: COMPLETED | OUTPATIENT
Start: 2021-10-08 | End: 2021-10-08

## 2021-10-08 RX ORDER — TIZANIDINE 4 MG/1
2 TABLET ORAL 2 TIMES DAILY
Qty: 20 TABLET | Refills: 0 | Status: SHIPPED | OUTPATIENT
Start: 2021-10-08 | End: 2022-01-06 | Stop reason: ALTCHOICE

## 2021-10-08 RX ORDER — LANCETS 30 GAUGE
1 EACH MISCELLANEOUS 2 TIMES DAILY
Qty: 50 EACH | Refills: 5 | Status: SHIPPED | OUTPATIENT
Start: 2021-10-08

## 2021-10-08 RX ORDER — PIOGLITAZONEHYDROCHLORIDE 30 MG/1
30 TABLET ORAL DAILY
Qty: 30 TABLET | Refills: 0 | Status: SHIPPED | OUTPATIENT
Start: 2021-10-08 | End: 2021-11-10 | Stop reason: SDUPTHER

## 2021-10-08 RX ADMIN — ONDANSETRON 4 MG: 4 TABLET, ORALLY DISINTEGRATING ORAL at 01:33

## 2021-10-08 RX ADMIN — ACETAMINOPHEN 650 MG: 325 TABLET ORAL at 01:33

## 2021-10-08 SDOH — ECONOMIC STABILITY: FOOD INSECURITY: WITHIN THE PAST 12 MONTHS, YOU WORRIED THAT YOUR FOOD WOULD RUN OUT BEFORE YOU GOT MONEY TO BUY MORE.: NEVER TRUE

## 2021-10-08 SDOH — ECONOMIC STABILITY: FOOD INSECURITY: WITHIN THE PAST 12 MONTHS, THE FOOD YOU BOUGHT JUST DIDN'T LAST AND YOU DIDN'T HAVE MONEY TO GET MORE.: NEVER TRUE

## 2021-10-08 ASSESSMENT — PATIENT HEALTH QUESTIONNAIRE - PHQ9
SUM OF ALL RESPONSES TO PHQ QUESTIONS 1-9: 0
1. LITTLE INTEREST OR PLEASURE IN DOING THINGS: 0
2. FEELING DOWN, DEPRESSED OR HOPELESS: 0
SUM OF ALL RESPONSES TO PHQ QUESTIONS 1-9: 0
SUM OF ALL RESPONSES TO PHQ9 QUESTIONS 1 & 2: 0
SUM OF ALL RESPONSES TO PHQ QUESTIONS 1-9: 0

## 2021-10-08 ASSESSMENT — ENCOUNTER SYMPTOMS
SHORTNESS OF BREATH: 0
ABDOMINAL PAIN: 0
SORE THROAT: 0
VOMITING: 0
NAUSEA: 0
SINUS PAIN: 0
RHINORRHEA: 0
EYE PAIN: 0
COUGH: 0
WHEEZING: 0
TROUBLE SWALLOWING: 0
CHEST TIGHTNESS: 0
SINUS PRESSURE: 0
EYE DISCHARGE: 0
BLOOD IN STOOL: 0

## 2021-10-08 ASSESSMENT — PAIN SCALES - GENERAL: PAINLEVEL_OUTOF10: 5

## 2021-10-08 ASSESSMENT — SOCIAL DETERMINANTS OF HEALTH (SDOH)
HOW HARD IS IT FOR YOU TO PAY FOR THE VERY BASICS LIKE FOOD, HOUSING, MEDICAL CARE, AND HEATING?: NOT HARD AT ALL
HOW HARD IS IT FOR YOU TO PAY FOR THE VERY BASICS LIKE FOOD, HOUSING, MEDICAL CARE, AND HEATING?: NOT HARD AT ALL

## 2021-10-08 NOTE — PATIENT INSTRUCTIONS
Deep breathing exercises    Walk every hour on the hour    Meloxicam after food. Yogurt or probiotic.     Glucometer--check sugar twice a day    Metformin    actos    diabetic ensure    ER tomorrow --if not better    Mag oxide a day    Tizanidine caution drowsiness    Vit c 500mg twice a day    Zinc 50 mg a day    Vit D3 2000 units a day    8 oz fluids every hour from 8 AM to 6 pm    Pulse oximeter--if o2 <92 %--ER

## 2021-10-08 NOTE — PROGRESS NOTES
Farrah Loco (:  1960) is a 63 y.o. male,New patient, here for evaluation of the following chief complaint(s): Concern For COVID-19 (exposed to someone with possible covid one week ago and received the first dose of the vaccine on Monday and he has been miserable since.), Generalized Body Aches (went to er did receive iv toradol that helped ), Fatigue, and Nausea  Extensive ER chart review done with him. Best friend test Wed--test result pnd     ASSESSMENT/PLAN:  1. Type 2 diabetes mellitus without complication, without long-term current use of insulin ([facility])  -     Blood Glucose Monitoring Suppl (TRUE METRIX AIR GLUCOSE METER) DANGELO; Check sugar twice a day, Disp-1 each, R-0Normal  -     blood glucose test strips (TRUE METRIX BLOOD GLUCOSE TEST) strip; 1 each by In Vitro route 2 times daily As needed. , Disp-50 each, R-5Normal  -     Lancets MISC; 2 TIMES DAILY Starting Fri 10/8/2021, Disp-50 each, R-5, Normal  -     metFORMIN (GLUCOPHAGE) 500 MG tablet; 1 tablet twice a day for 1 week and then 1tab  3 times a day and then 2 tablets twice a day with food. , Disp-120 tablet, R-1Normal  -     pioglitazone (ACTOS) 30 MG tablet; Take 1 tablet by mouth daily, Disp-30 tablet, R-0Normal  2. Boil of lower extremity keep clindamycin and yogurt or probiotic. 3. Myalgia  -     tiZANidine (ZANAFLEX) 4 MG tablet; Take 0.5 tablets by mouth 2 times daily Watch drowsiness, Disp-20 tablet, R-0Normal  -     Magnesium Oxide 500 MG TABS; Take 500 mg by mouth daily, Disp-30 tablet, R-0Normal  Recommended for him to go to emergency room tomorrow if he is not doing better today with all these treatments. Return in about 1 week (around 10/15/2021). SUBJECTIVE/OBJECTIVE:  Boil L thigh--lanced--cleaned and changed dressing and that is doing better as he is taking care of it.     He does have muscle pain and he was told that it is a reaction to his vaccination and as he was probably exposed and we do not know his test results and on top of that he got vaccination on Monday and whether that complicated it and that he had boil infection and for that clindamycin is getting and then he is uncontrolled diabetic so all this combination probably causing his symptoms and he is trying to work with more fluids and avoiding sweets and Toradol took care of the pain completely and he did not get meloxicam until late and he did start his meloxicam and explained to him the meloxicam should help the muscle pain and I can add tizanidine muscle relaxant to it and magnesium oxide and he is not eating well and explained to him that he does need to eat some lean meat and protein or diabetic and sugar and my chart message sent on all the vitamin C zinc vitamin D3 all the combination of medication he needs to be taking and checking sugars. Diabetes Mellitus     He is a diabetic and he used to be diabetic he said he took care of it with Actos in the past and now he was losing weight and that could be with uncontrolled diabetes at present and explained to him that his sugar is very high. .    The patient denied polyphagia, polydipsia, or polyuria. The available labs reviewed and analyzed and independent interpretation of the results explained at length.   The last HBA1C and routine lab was Lab Results       Component                Value               Date                       LABA1C                   11.8                10/07/2021            Lab Results       Component                Value               Date                       EAG                      292.0               10/07/2021            Lab Results       Component                Value               Date                       NA                       131 (L)             10/07/2021                 K                        4.2                 10/07/2021                 CL                       98 (L)              10/07/2021                 CREATININE               0.8 (L)             10/07/2021 BUN                      14                  10/07/2021                 CO2                      18 (L)              10/07/2021                 LABMICR                  YES                 10/07/2021              Has been taking meds as ordered. He has no side effects from the current diabetes medications. Generalized Body Aches  This is a new problem. The current episode started in the past 7 days. The problem occurs constantly. The problem has been waxing and waning. Associated symptoms include fatigue and myalgias. Pertinent negatives include no abdominal pain, chest pain, chills, congestion, coughing, fever, headaches, nausea, numbness, rash, sore throat, vomiting or weakness. The symptoms are aggravated by bending. He has tried NSAIDs for the symptoms. The treatment provided mild relief. Fatigue  Associated symptoms include fatigue and myalgias. Pertinent negatives include no abdominal pain, chest pain, chills, congestion, coughing, fever, headaches, nausea, numbness, rash, sore throat, vomiting or weakness. Review of Systems   Constitutional: Positive for fatigue. Negative for appetite change, chills, fever and unexpected weight change. HENT: Negative for congestion, ear discharge, ear pain, nosebleeds, rhinorrhea, sinus pressure, sinus pain, sore throat and trouble swallowing. Eyes: Negative for pain and discharge. Respiratory: Negative for cough, chest tightness, shortness of breath and wheezing. Cardiovascular: Negative for chest pain, palpitations and leg swelling. Gastrointestinal: Negative for abdominal pain, blood in stool, nausea and vomiting. Endocrine: Negative for polydipsia and polyphagia. Genitourinary: Negative for difficulty urinating, enuresis, flank pain and hematuria. Musculoskeletal: Positive for myalgias. Skin: Negative for rash. Neurological: Negative for facial asymmetry, weakness, light-headedness, numbness and headaches. Psychiatric/Behavioral: Negative for confusion. Patient-Reported Vitals 10/8/2021   Patient-Reported Temperature 98.8        Physical Exam    [INSTRUCTIONS:  \"[x]\" Indicates a positive item  \"[]\" Indicates a negative item  -- DELETE ALL ITEMS NOT EXAMINED]    Constitutional: [x] Appears well-developed and well-nourished [x] No apparent distress      [] Abnormal -     Mental status: [x] Alert and awake  [x] Oriented to person/place/time [x] Able to follow commands    [] Abnormal -     Eyes:   EOM    [x]  Normal    [] Abnormal -   Sclera  [x]  Normal    [] Abnormal -          Discharge [x]  None visible   [] Abnormal -     HENT: [x] Normocephalic, atraumatic  [] Abnormal -   [] Mouth/Throat: Mucous membranes are moist    External Ears [x] Normal  [] Abnormal -    Neck: [x] No visualized mass [] Abnormal -     Pulmonary/Chest: [x] Respiratory effort normal   [x] No visualized signs of difficulty breathing or respiratory distress        [] Abnormal -   No shortness of breath and chest x-ray normal yesterday  Musculoskeletal:   [x] Normal gait with no signs of ataxia         [] Normal range of motion of neck        [] Abnormal -   Myalgia generalized  Neurological:        [x] No Facial Asymmetry (Cranial nerve 7 motor function) (limited exam due to video visit)          [x] No gaze palsy        [] Abnormal -          Skin:        [x] No significant exanthematous lesions or discoloration noted on facial skin         [] Abnormal -            Psychiatric:       [x] Normal Affect [] Abnormal -        [x] No Hallucinations    Other pertinent observable physical exam findings:-          On this date 10/8/2021 I have spent 44 minutes reviewing previous notes, test results and face to face (virtual) with the patient discussing the diagnosis and importance of compliance with the treatment plan as well as documenting on the day of the visit.       Floresita Beth, was evaluated through a synchronous (real-time) audio-video encounter. The patient (or guardian if applicable) is aware that this is a billable service. Verbal consent to proceed has been obtained within the past 12 months. The visit was conducted pursuant to the emergency declaration under the [address] [place] [address] authority and the [employer] Act. Patient identification was verified, and a caregiver was present when appropriate. The patient was located in a state where the provider was credentialed to provide care. An electronic signature was used to authenticate this note.     --Mauricio Moyer MD

## 2021-10-09 ENCOUNTER — APPOINTMENT (OUTPATIENT)
Dept: GENERAL RADIOLOGY | Age: 61
End: 2021-10-09
Payer: COMMERCIAL

## 2021-10-09 ENCOUNTER — HOSPITAL ENCOUNTER (EMERGENCY)
Age: 61
Discharge: HOME | End: 2021-10-09
Attending: EMERGENCY MEDICINE
Payer: COMMERCIAL

## 2021-10-09 VITALS
SYSTOLIC BLOOD PRESSURE: 132 MMHG | DIASTOLIC BLOOD PRESSURE: 66 MMHG | TEMPERATURE: 98.2 F | BODY MASS INDEX: 30.06 KG/M2 | HEIGHT: 70 IN | RESPIRATION RATE: 20 BRPM | OXYGEN SATURATION: 99 % | WEIGHT: 210 LBS | HEART RATE: 63 BPM

## 2021-10-09 DIAGNOSIS — U07.1 COVID-19: Primary | ICD-10-CM

## 2021-10-09 DIAGNOSIS — E87.1 HYPONATREMIA: ICD-10-CM

## 2021-10-09 LAB
ALBUMIN SERPL-MCNC: 3.6 G/DL (ref 3.4–5)
ALP BLD-CCNC: 57 U/L (ref 40–129)
ALT SERPL-CCNC: 35 U/L (ref 10–40)
ANION GAP SERPL CALCULATED.3IONS-SCNC: 11 MMOL/L (ref 3–16)
ANION GAP SERPL CALCULATED.3IONS-SCNC: 14 MMOL/L (ref 3–16)
AST SERPL-CCNC: 45 U/L (ref 15–37)
BACTERIA: ABNORMAL /HPF
BASE EXCESS VENOUS: -2.2 MMOL/L (ref -2–3)
BASOPHILS ABSOLUTE: 0 K/UL (ref 0–0.2)
BASOPHILS RELATIVE PERCENT: 0.4 %
BILIRUB SERPL-MCNC: 0.6 MG/DL (ref 0–1)
BILIRUBIN DIRECT: <0.2 MG/DL (ref 0–0.3)
BILIRUBIN URINE: NEGATIVE
BILIRUBIN, INDIRECT: ABNORMAL MG/DL (ref 0–1)
BLOOD, URINE: NEGATIVE
BUN BLDV-MCNC: 15 MG/DL (ref 7–20)
BUN BLDV-MCNC: 15 MG/DL (ref 7–20)
CALCIUM SERPL-MCNC: 7.6 MG/DL (ref 8.3–10.6)
CALCIUM SERPL-MCNC: 8.2 MG/DL (ref 8.3–10.6)
CARBOXYHEMOGLOBIN: 1.2 % (ref 0–1.5)
CHLORIDE BLD-SCNC: 95 MMOL/L (ref 99–110)
CHLORIDE BLD-SCNC: 98 MMOL/L (ref 99–110)
CLARITY: CLEAR
CO2: 18 MMOL/L (ref 21–32)
CO2: 19 MMOL/L (ref 21–32)
COLOR: YELLOW
CREAT SERPL-MCNC: 0.8 MG/DL (ref 0.9–1.3)
CREAT SERPL-MCNC: 0.9 MG/DL (ref 0.9–1.3)
EOSINOPHILS ABSOLUTE: 0 K/UL (ref 0–0.6)
EOSINOPHILS RELATIVE PERCENT: 0.1 %
EPITHELIAL CELLS, UA: ABNORMAL /HPF (ref 0–5)
GFR AFRICAN AMERICAN: >60
GFR AFRICAN AMERICAN: >60
GFR NON-AFRICAN AMERICAN: >60
GFR NON-AFRICAN AMERICAN: >60
GLUCOSE BLD-MCNC: 230 MG/DL (ref 70–99)
GLUCOSE BLD-MCNC: 234 MG/DL (ref 70–99)
GLUCOSE URINE: 500 MG/DL
HCO3 VENOUS: 22 MMOL/L (ref 24–28)
HCT VFR BLD CALC: 40.1 % (ref 40.5–52.5)
HEMOGLOBIN, VEN, REDUCED: 9.3 %
HEMOGLOBIN: 14.2 G/DL (ref 13.5–17.5)
KETONES, URINE: 40 MG/DL
LACTIC ACID: 1.1 MMOL/L (ref 0.4–2)
LEUKOCYTE ESTERASE, URINE: NEGATIVE
LYMPHOCYTES ABSOLUTE: 0.7 K/UL (ref 1–5.1)
LYMPHOCYTES RELATIVE PERCENT: 31 %
MAGNESIUM: 1.6 MG/DL (ref 1.8–2.4)
MCH RBC QN AUTO: 30.7 PG (ref 26–34)
MCHC RBC AUTO-ENTMCNC: 35.5 G/DL (ref 31–36)
MCV RBC AUTO: 86.7 FL (ref 80–100)
METHEMOGLOBIN VENOUS: 0.4 % (ref 0–1.5)
MICROSCOPIC EXAMINATION: YES
MONOCYTES ABSOLUTE: 0.3 K/UL (ref 0–1.3)
MONOCYTES RELATIVE PERCENT: 12.9 %
NEUTROPHILS ABSOLUTE: 1.3 K/UL (ref 1.7–7.7)
NEUTROPHILS RELATIVE PERCENT: 55.6 %
NITRITE, URINE: NEGATIVE
O2 SAT, VEN: 91 %
OSMOLALITY URINE: 620 MOSM/KG (ref 390–1070)
OSMOLALITY: 280 MOSM/KG (ref 278–305)
PCO2, VEN: 35.6 MMHG (ref 41–51)
PDW BLD-RTO: 12.4 % (ref 12.4–15.4)
PH UA: 5.5 (ref 5–8)
PH VENOUS: 7.4 (ref 7.35–7.45)
PHOSPHORUS: 3.2 MG/DL (ref 2.5–4.9)
PLATELET # BLD: 74 K/UL (ref 135–450)
PMV BLD AUTO: 7.9 FL (ref 5–10.5)
PO2, VEN: 55.7 MMHG (ref 25–40)
POTASSIUM SERPL-SCNC: 4.1 MMOL/L (ref 3.5–5.1)
POTASSIUM SERPL-SCNC: 4.2 MMOL/L (ref 3.5–5.1)
PROTEIN UA: ABNORMAL MG/DL
RBC # BLD: 4.62 M/UL (ref 4.2–5.9)
RBC UA: ABNORMAL /HPF (ref 0–4)
SODIUM BLD-SCNC: 127 MMOL/L (ref 136–145)
SODIUM BLD-SCNC: 128 MMOL/L (ref 136–145)
SODIUM URINE: 51 MMOL/L
SPECIFIC GRAVITY UA: 1.02 (ref 1–1.03)
TCO2 CALC VENOUS: 23 MMOL/L
TOTAL PROTEIN: 7.2 G/DL (ref 6.4–8.2)
URINE TYPE: ABNORMAL
UROBILINOGEN, URINE: 0.2 E.U./DL
WBC # BLD: 2.3 K/UL (ref 4–11)
WBC UA: ABNORMAL /HPF (ref 0–5)

## 2021-10-09 PROCEDURE — 81001 URINALYSIS AUTO W/SCOPE: CPT

## 2021-10-09 PROCEDURE — 71045 X-RAY EXAM CHEST 1 VIEW: CPT

## 2021-10-09 PROCEDURE — 99285 EMERGENCY DEPT VISIT HI MDM: CPT

## 2021-10-09 PROCEDURE — 83935 ASSAY OF URINE OSMOLALITY: CPT

## 2021-10-09 PROCEDURE — 83930 ASSAY OF BLOOD OSMOLALITY: CPT

## 2021-10-09 PROCEDURE — 96374 THER/PROPH/DIAG INJ IV PUSH: CPT

## 2021-10-09 PROCEDURE — 6360000002 HC RX W HCPCS: Performed by: EMERGENCY MEDICINE

## 2021-10-09 PROCEDURE — 82803 BLOOD GASES ANY COMBINATION: CPT

## 2021-10-09 PROCEDURE — 83605 ASSAY OF LACTIC ACID: CPT

## 2021-10-09 PROCEDURE — 99283 EMERGENCY DEPT VISIT LOW MDM: CPT | Performed by: INTERNAL MEDICINE

## 2021-10-09 PROCEDURE — 96375 TX/PRO/DX INJ NEW DRUG ADDON: CPT

## 2021-10-09 PROCEDURE — 84300 ASSAY OF URINE SODIUM: CPT

## 2021-10-09 PROCEDURE — 85025 COMPLETE CBC W/AUTO DIFF WBC: CPT

## 2021-10-09 PROCEDURE — 80048 BASIC METABOLIC PNL TOTAL CA: CPT

## 2021-10-09 PROCEDURE — 83735 ASSAY OF MAGNESIUM: CPT

## 2021-10-09 PROCEDURE — 80076 HEPATIC FUNCTION PANEL: CPT

## 2021-10-09 PROCEDURE — 84100 ASSAY OF PHOSPHORUS: CPT

## 2021-10-09 PROCEDURE — 36415 COLL VENOUS BLD VENIPUNCTURE: CPT

## 2021-10-09 PROCEDURE — 2580000003 HC RX 258: Performed by: EMERGENCY MEDICINE

## 2021-10-09 RX ORDER — PROMETHAZINE HYDROCHLORIDE 25 MG/1
25 TABLET ORAL EVERY 6 HOURS PRN
Qty: 20 TABLET | Refills: 0 | Status: SHIPPED | OUTPATIENT
Start: 2021-10-09 | End: 2021-10-16

## 2021-10-09 RX ORDER — 0.9 % SODIUM CHLORIDE 0.9 %
1000 INTRAVENOUS SOLUTION INTRAVENOUS ONCE
Status: COMPLETED | OUTPATIENT
Start: 2021-10-09 | End: 2021-10-09

## 2021-10-09 RX ORDER — ONDANSETRON 2 MG/ML
4 INJECTION INTRAMUSCULAR; INTRAVENOUS ONCE
Status: COMPLETED | OUTPATIENT
Start: 2021-10-09 | End: 2021-10-09

## 2021-10-09 RX ORDER — SODIUM CHLORIDE, SODIUM LACTATE, POTASSIUM CHLORIDE, CALCIUM CHLORIDE 600; 310; 30; 20 MG/100ML; MG/100ML; MG/100ML; MG/100ML
1000 INJECTION, SOLUTION INTRAVENOUS ONCE
Status: COMPLETED | OUTPATIENT
Start: 2021-10-09 | End: 2021-10-09

## 2021-10-09 RX ORDER — KETOROLAC TROMETHAMINE 30 MG/ML
15 INJECTION, SOLUTION INTRAMUSCULAR; INTRAVENOUS ONCE
Status: COMPLETED | OUTPATIENT
Start: 2021-10-09 | End: 2021-10-09

## 2021-10-09 RX ADMIN — SODIUM CHLORIDE 1000 ML: 9 INJECTION, SOLUTION INTRAVENOUS at 09:25

## 2021-10-09 RX ADMIN — SODIUM CHLORIDE, POTASSIUM CHLORIDE, SODIUM LACTATE AND CALCIUM CHLORIDE 1000 ML: 600; 310; 30; 20 INJECTION, SOLUTION INTRAVENOUS at 08:09

## 2021-10-09 RX ADMIN — ONDANSETRON 4 MG: 2 INJECTION INTRAMUSCULAR; INTRAVENOUS at 08:05

## 2021-10-09 RX ADMIN — KETOROLAC TROMETHAMINE 15 MG: 30 INJECTION, SOLUTION INTRAMUSCULAR at 08:06

## 2021-10-09 ASSESSMENT — PAIN SCALES - GENERAL
PAINLEVEL_OUTOF10: 5
PAINLEVEL_OUTOF10: 5
PAINLEVEL_OUTOF10: 9
PAINLEVEL_OUTOF10: 4
PAINLEVEL_OUTOF10: 9

## 2021-10-09 ASSESSMENT — PAIN DESCRIPTION - PAIN TYPE
TYPE: ACUTE PAIN
TYPE: ACUTE PAIN

## 2021-10-09 ASSESSMENT — PAIN DESCRIPTION - DESCRIPTORS: DESCRIPTORS: ACHING

## 2021-10-09 ASSESSMENT — PAIN DESCRIPTION - LOCATION
LOCATION: GENERALIZED
LOCATION: GENERALIZED

## 2021-10-09 NOTE — CONSULTS
Nephrology Consult Note                                                                                                                                                                                                                                                                                                                                                               Office : [phone]     Fax :[phone]              Patient's Name: Keily Gardner  12:37 PM  10/9/2021    Reason for Consult:  Hyponatremia   Requesting Physician:  Rosalino Britt DO      Chief Complaint: COVID     History of Present Ilness:    Keily Gardner is a 63 y.o. male with COVID   Na is trending down   Came to ER for myalgias   BP stable   Oral intake poor per ER doc   S/p saline boluses     Past Medical History:   Diagnosis Date    Acid reflux     Bradycardia     Cardiomyopathy ([address]     CHB (complete heart block) ([address]     CHF (congestive heart failure) ([address]     Hypertension     ICD (implantable cardioverter-defibrillator) battery depletion 5/11/16    Dual chamber [employer] - Dr. Geronimo Multani    Type 2 diabetes mellitus without complication, without long-term current use of insulin ([address] 10/8/2021       Past Surgical History:   Procedure Laterality Date    CARDIAC PACEMAKER PLACEMENT      COSMETIC SURGERY      PACEMAKER PLACEMENT      TONSILLECTOMY         Family History   Problem Relation Age of Onset    Diabetes Father     Arrhythmia Mother     Arthritis Mother         reports that he has never smoked. He has never used smokeless tobacco. He reports that he does not drink alcohol and does not use drugs. Allergies:  Doxycycline, Lisinopril, Losartan, Septra [sulfamethoxazole-trimethoprim], Sulfa antibiotics, and Tetracyclines & related    Current Medications:    No current facility-administered medications for this encounter.         Physical exam:     Vitals:  /70   Pulse 64   Temp 98.2 °F (36.8 °C) participate in care of Danny Palma MD  Feel free to contact me   [facility] [address]  Office : [phone]  Fax :[phone]

## 2021-10-09 NOTE — ED PROVIDER NOTES
[address] [facility]          ATTENDING PHYSICIAN NOTE       Date of evaluation: 10/9/2021    Chief Complaint     Positive For Covid-19 (c/o body aches dizziness) and Nausea      History of Present Illness     Rashad Doyle is a 63 y.o. male who presents to the emergency department with complaints of myalgias and generally feeling unwell. The patient received his mother and a vaccination earlier in the week and had been in contact with somebody who had viral symptoms over the course of this week and then 2 days ago was seen in the emergency department where he had viral symptoms and ultimately had a Covid PCR performed which was positive. Patient reports that since going home he has had diffuse myalgias in his back bilateral arms and legs that has been primarily worse at night. Denies any difficulty breathing denies any chest pain has been nauseous but has not been vomiting. Denies significant diarrhea denies any abdominal discomfort. He has been attempting to take the Mobic as well as Zofran that were prescribed to him but reports incomplete resolution of his symptoms. Review of Systems     As documented in the HPI, otherwise all other systems were reviewed and were negative. Past Medical, Surgical, Family, and Social History     He has a past medical history of Acid reflux, Bradycardia, Cardiomyopathy ([address] CHB (complete heart block) ([address] CHF (congestive heart failure) ([address] Hypertension, ICD (implantable cardioverter-defibrillator) battery depletion, and Type 2 diabetes mellitus without complication, without long-term current use of insulin ([address] He has a past surgical history that includes Cardiac pacemaker placement; pacemaker placement; Cosmetic surgery; and Tonsillectomy. His family history includes Arrhythmia in his mother; Arthritis in his mother; Diabetes in his father. He reports that he has never smoked.  He has never used smokeless tobacco. He reports that he does not drink alcohol and does not use drugs. Medications     Previous Medications    BLOOD GLUCOSE MONITORING SUPPL (TRUE METRIX AIR GLUCOSE METER) DANGELO    Check sugar twice a day    BLOOD GLUCOSE TEST STRIPS (TRUE METRIX BLOOD GLUCOSE TEST) STRIP    1 each by In Vitro route 2 times daily As needed. CLINDAMYCIN (CLEOCIN) 300 MG CAPSULE    Take 1 capsule by mouth 4 times daily for 7 days    FAMOTIDINE (PEPCID) 20 MG TABLET    Take 20 mg by mouth nightly as needed. LANCETS MISC    1 each by Does not apply route 2 times daily    MAGNESIUM OXIDE 500 MG TABS    Take 500 mg by mouth daily    MELOXICAM (MOBIC) 15 MG TABLET    Take 0.5 tablets by mouth 2 times daily for 10 days    METFORMIN (GLUCOPHAGE) 500 MG TABLET    1 tablet twice a day for 1 week and then 1tab  3 times a day and then 2 tablets twice a day with food. METOPROLOL SUCCINATE (TOPROL XL) 100 MG EXTENDED RELEASE TABLET    Take 1 tablet by mouth daily    ONDANSETRON (ZOFRAN ODT) 4 MG DISINTEGRATING TABLET    Take 1 tablet by mouth every 8 hours as needed for Nausea    PIOGLITAZONE (ACTOS) 30 MG TABLET    Take 1 tablet by mouth daily    TIZANIDINE (ZANAFLEX) 4 MG TABLET    Take 0.5 tablets by mouth 2 times daily Watch drowsiness       Allergies     He is allergic to doxycycline, lisinopril, losartan, septra [sulfamethoxazole-trimethoprim], sulfa antibiotics, and tetracyclines & related.     Physical Exam     INITIAL VITALS: BP: (!) 140/72, Temp: 98.2 °F (36.8 °C), Pulse: 73, Resp: 16, SpO2: 97 %   General: 43-year-old male sitting in bed no apparent cardiorespiratory distress  HEENT:  head is atraumatic, pupils equal round and reactive to light, sclera are clear, oropharynx is nonerythematous  Neck: supple, no lymphadenopathy  Chest: nonlabored respirations, equal chest rise bilaterally, no accessory muscle use  Cardiovascular: Regular, rate, and rhythm, 2+ radial pulses bilaterally, capillary refill 2 seconds  Abdominal: Soft, nontender, nondistended, positive bowel sounds throughout, no rebound or guarding  Skin: Warm, dry well perfused, no rashes  Musculoskeletal: no obvious deformities, no tenderness to palpation diffusely  Neurologic:  alert and oriented x4, speech is clear and intact without dysarthria, gait is intact    Diagnostic Results     RADIOLOGY:  XR CHEST PORTABLE   Final Result   1. No findings for acute cardiopulmonary disease.           LABS:   Results for orders placed or performed during the hospital encounter of 10/09/21   CBC Auto Differential   Result Value Ref Range    WBC 2.3 (L) 4.0 - 11.0 K/uL    RBC 4.62 4.20 - 5.90 M/uL    Hemoglobin 14.2 13.5 - 17.5 g/dL    Hematocrit 40.1 (L) 40.5 - 52.5 %    MCV 86.7 80.0 - 100.0 fL    MCH 30.7 26.0 - 34.0 pg    MCHC 35.5 31.0 - 36.0 g/dL    RDW 12.4 12.4 - 15.4 %    Platelets 74 (L) 313 - 450 K/uL    MPV 7.9 5.0 - 10.5 fL    Neutrophils % 55.6 %    Lymphocytes % 31.0 %    Monocytes % 12.9 %    Eosinophils % 0.1 %    Basophils % 0.4 %    Neutrophils Absolute 1.3 (L) 1.7 - 7.7 K/uL    Lymphocytes Absolute 0.7 (L) 1.0 - 5.1 K/uL    Monocytes Absolute 0.3 0.0 - 1.3 K/uL    Eosinophils Absolute 0.0 0.0 - 0.6 K/uL    Basophils Absolute 0.0 0.0 - 0.2 K/uL   Basic Metabolic Panel   Result Value Ref Range    Sodium 127 (L) 136 - 145 mmol/L    Potassium 4.1 3.5 - 5.1 mmol/L    Chloride 95 (L) 99 - 110 mmol/L    CO2 18 (L) 21 - 32 mmol/L    Anion Gap 14 3 - 16    Glucose 234 (H) 70 - 99 mg/dL    BUN 15 7 - 20 mg/dL    CREATININE 0.9 0.9 - 1.3 mg/dL    GFR Non-African American >60 >60    GFR African American >60 >60    Calcium 8.2 (L) 8.3 - 10.6 mg/dL   Phosphorus   Result Value Ref Range    Phosphorus 3.2 2.5 - 4.9 mg/dL   Magnesium   Result Value Ref Range    Magnesium 1.60 (L) 1.80 - 2.40 mg/dL   Hepatic Function Panel   Result Value Ref Range    Total Protein 7.2 6.4 - 8.2 g/dL    Albumin 3.6 3.4 - 5.0 g/dL    Alkaline Phosphatase 57 40 - 129 U/L    ALT 35 10 - 40 U/L    AST 45 (H) 15 - 37 U/L Total Bilirubin 0.6 0.0 - 1.0 mg/dL    Bilirubin, Direct <0.2 0.0 - 0.3 mg/dL    Bilirubin, Indirect see below 0.0 - 1.0 mg/dL   Urinalysis with Microscopic   Result Value Ref Range    Color, UA Yellow Straw/Yellow    Clarity, UA Clear Clear    Glucose, Ur 500 (A) Negative mg/dL    Bilirubin Urine Negative Negative    Ketones, Urine 40 (A) Negative mg/dL    Specific Gravity, UA 1.025 1.005 - 1.030    Blood, Urine Negative Negative    pH, UA 5.5 5.0 - 8.0    Protein, UA TRACE (A) Negative mg/dL    Urobilinogen, Urine 0.2 <2.0 E.U./dL    Nitrite, Urine Negative Negative    Leukocyte Esterase, Urine Negative Negative    Microscopic Examination YES     Urine Type Other     WBC, UA 3-5 0 - 5 /HPF    RBC, UA 0-2 0 - 4 /HPF    Epithelial Cells, UA 2-5 0 - 5 /HPF    Bacteria, UA 1+ (A) None Seen /HPF   Osmolality, urine   Result Value Ref Range    Osmolality, Ur 620 390 - 1070 mOsm/kg   Blood gas, venous (Lab)   Result Value Ref Range    pH, Cj 7.400 7.350 - 7.450    pCO2, Cj 35.6 (L) 41.0 - 51.0 mmHg    pO2, Cj 55.7 (H) 25 - 40 mmHg    HCO3, Venous 22.0 (L) 24.0 - 28.0 mmol/L    Base Excess, Cj -2.2 (L) -2.0 - 3.0 mmol/L    O2 Sat, Cj 91 Not established %    Carboxyhemoglobin 1.2 0.0 - 1.5 %    MetHgb, Cj 0.4 0.0 - 1.5 %    TC02 (Calc), Cj 23 mmol/L    Hemoglobin, Cj, Reduced 9.30 %   Lactic Acid, Plasma   Result Value Ref Range    Lactic Acid 1.1 0.4 - 2.0 mmol/L   Sodium, urine, random   Result Value Ref Range    Sodium, Ur 51 Not Established mmol/L   Osmolality   Result Value Ref Range    Osmolality 280 278 - 305 mOsm/kg   Basic Metabolic Panel   Result Value Ref Range    Sodium 128 (L) 136 - 145 mmol/L    Potassium 4.2 3.5 - 5.1 mmol/L    Chloride 98 (L) 99 - 110 mmol/L    CO2 19 (L) 21 - 32 mmol/L    Anion Gap 11 3 - 16    Glucose 230 (H) 70 - 99 mg/dL    BUN 15 7 - 20 mg/dL    CREATININE 0.8 (L) 0.9 - 1.3 mg/dL    GFR Non-African American >60 >60    GFR African American >60 >60    Calcium 7.6 (L) 8.3 - 10.6 mg/dL       RECENT VITALS:  BP: 134/70, Temp: 98.2 °F (36.8 °C), Pulse: 64, Resp: 17, SpO2: 97 %       ED Course     Nursing Notes, Past Medical Hx, Past Surgical Hx, Social Hx, Allergies, and Family Hx were reviewed. The patient was given the following medications:  Orders Placed This Encounter   Medications    lactated ringers infusion 1,000 mL    ketorolac (TORADOL) injection 15 mg    ondansetron (ZOFRAN) injection 4 mg    0.9 % sodium chloride bolus       CONSULTS:  [address] [place] [address] / ASSESSMENT / Dock Asher is a 63 y.o. male who presents emergency department with a complaint of diffuse myalgias and generally feeling unwell. He was diagnosed with COVID-19 2 days ago. On assessment the patient was in no active respiratory distress. Overall well. Laboratory investigations were sent and showed hyponatremia with initial serum sodium of 127. Patient's VBG showed a pH of 7.4 should have a slightly low bicarb on her basic metabolic profile as well urinalysis without evidence of infection by 40 of ketones. His CBC showed leukopenia and lymphopenia consistent with COVID-19. The patient had serum and urine osmolality done to assess for the cause of his hyponatremia which showed a syndrome of inappropriate ADH type picture which is consistent also with COVID-19 infection. I did speak with Dr. Yaniv Garcia of nephrology who recommended the patient restrict water intake to less than 32 ounces a day and to encourage protein intake with protein shakes and Gatorade as needed for hydration. Patient was given IV fluids here in the emergency department. Ultimately he has no emergent indication for admission. I feel he is safe for discharge home with instructions as described above. Instructed to return for worsening symptoms. Clinical Impression     1. COVID-19    2.  Hyponatremia        Disposition     PATIENT REFERRED TO:  Ariadna Aguilera [address] [facility], [address] [place] [address] [address]  [address] [id number] [phone]            DISCHARGE MEDICATIONS:  New Prescriptions    No medications on file       DISPOSITION Decision To Discharge 10/09/2021 12:01:05 PM         Cynthia Lozada MD  10/09/21 [id number]

## 2021-10-10 LAB
GRAM STAIN RESULT: ABNORMAL
ORGANISM: ABNORMAL
WOUND/ABSCESS: ABNORMAL
WOUND/ABSCESS: ABNORMAL

## 2021-10-11 ENCOUNTER — CARE COORDINATION (OUTPATIENT)
Dept: CARE COORDINATION | Age: 61
End: 2021-10-11

## 2021-10-11 ENCOUNTER — NURSE ONLY (OUTPATIENT)
Dept: CARDIOLOGY CLINIC | Age: 61
End: 2021-10-11
Payer: COMMERCIAL

## 2021-10-11 DIAGNOSIS — I42.8 NONISCHEMIC CARDIOMYOPATHY (HCC): ICD-10-CM

## 2021-10-11 DIAGNOSIS — I44.2 CHB (COMPLETE HEART BLOCK) (HCC): Chronic | ICD-10-CM

## 2021-10-11 DIAGNOSIS — Z95.810 BIVENTRICULAR AUTOMATIC IMPLANTABLE CARDIOVERTER DEFIBRILLATOR IN SITU: Chronic | ICD-10-CM

## 2021-10-11 DIAGNOSIS — I50.22 CHRONIC SYSTOLIC HEART FAILURE (HCC): Chronic | ICD-10-CM

## 2021-10-11 PROCEDURE — 93295 DEV INTERROG REMOTE 1/2/MLT: CPT | Performed by: INTERNAL MEDICINE

## 2021-10-11 PROCEDURE — 93297 REM INTERROG DEV EVAL ICPMS: CPT | Performed by: INTERNAL MEDICINE

## 2021-10-11 PROCEDURE — 93296 REM INTERROG EVL PM/IDS: CPT | Performed by: INTERNAL MEDICINE

## 2021-10-12 ENCOUNTER — HOSPITAL ENCOUNTER (EMERGENCY)
Age: 61
Discharge: HOME | End: 2021-10-12
Attending: EMERGENCY MEDICINE
Payer: COMMERCIAL

## 2021-10-12 ENCOUNTER — CARE COORDINATION (OUTPATIENT)
Dept: CARE COORDINATION | Age: 61
End: 2021-10-12

## 2021-10-12 ENCOUNTER — APPOINTMENT (OUTPATIENT)
Dept: GENERAL RADIOLOGY | Age: 61
End: 2021-10-12
Payer: COMMERCIAL

## 2021-10-12 VITALS
OXYGEN SATURATION: 93 % | TEMPERATURE: 98.1 F | SYSTOLIC BLOOD PRESSURE: 130 MMHG | RESPIRATION RATE: 23 BRPM | HEIGHT: 70 IN | WEIGHT: 210 LBS | BODY MASS INDEX: 30.06 KG/M2 | HEART RATE: 80 BPM | DIASTOLIC BLOOD PRESSURE: 76 MMHG

## 2021-10-12 DIAGNOSIS — U07.1 COVID-19: Primary | ICD-10-CM

## 2021-10-12 DIAGNOSIS — E11.65 HYPERGLYCEMIA DUE TO DIABETES MELLITUS (HCC): ICD-10-CM

## 2021-10-12 LAB
A/G RATIO: 0.9 (ref 1.1–2.2)
ALBUMIN SERPL-MCNC: 3.2 G/DL (ref 3.4–5)
ALP BLD-CCNC: 46 U/L (ref 40–129)
ALT SERPL-CCNC: 34 U/L (ref 10–40)
ANION GAP SERPL CALCULATED.3IONS-SCNC: 12 MMOL/L (ref 3–16)
AST SERPL-CCNC: 49 U/L (ref 15–37)
BASE EXCESS VENOUS: 0 MMOL/L (ref -2–3)
BASOPHILS ABSOLUTE: 0 K/UL (ref 0–0.2)
BASOPHILS RELATIVE PERCENT: 0.5 %
BILIRUB SERPL-MCNC: 0.6 MG/DL (ref 0–1)
BUN BLDV-MCNC: 17 MG/DL (ref 7–20)
CALCIUM SERPL-MCNC: 8 MG/DL (ref 8.3–10.6)
CARBOXYHEMOGLOBIN: 1.3 % (ref 0–1.5)
CHLORIDE BLD-SCNC: 95 MMOL/L (ref 99–110)
CO2: 20 MMOL/L (ref 21–32)
CREAT SERPL-MCNC: 0.9 MG/DL (ref 0.9–1.3)
EKG ATRIAL RATE: 78 BPM
EKG DIAGNOSIS: NORMAL
EKG P AXIS: 54 DEGREES
EKG P-R INTERVAL: 128 MS
EKG Q-T INTERVAL: 438 MS
EKG QRS DURATION: 148 MS
EKG QTC CALCULATION (BAZETT): 499 MS
EKG R AXIS: 221 DEGREES
EKG T AXIS: 66 DEGREES
EKG VENTRICULAR RATE: 78 BPM
EOSINOPHILS ABSOLUTE: 0 K/UL (ref 0–0.6)
EOSINOPHILS RELATIVE PERCENT: 0 %
GFR AFRICAN AMERICAN: >60
GFR NON-AFRICAN AMERICAN: >60
GLOBULIN: 3.5 G/DL
GLUCOSE BLD-MCNC: 307 MG/DL (ref 70–99)
HCO3 VENOUS: 24.1 MMOL/L (ref 24–28)
HCT VFR BLD CALC: 37.9 % (ref 40.5–52.5)
HEMOGLOBIN, VEN, REDUCED: 35.2 %
HEMOGLOBIN: 13.1 G/DL (ref 13.5–17.5)
LACTIC ACID, SEPSIS: 1.4 MMOL/L (ref 0.4–1.9)
LYMPHOCYTES ABSOLUTE: 0.4 K/UL (ref 1–5.1)
LYMPHOCYTES RELATIVE PERCENT: 16.4 %
MCH RBC QN AUTO: 30.3 PG (ref 26–34)
MCHC RBC AUTO-ENTMCNC: 34.5 G/DL (ref 31–36)
MCV RBC AUTO: 88 FL (ref 80–100)
METHEMOGLOBIN VENOUS: 0.4 % (ref 0–1.5)
MONOCYTES ABSOLUTE: 0.3 K/UL (ref 0–1.3)
MONOCYTES RELATIVE PERCENT: 12.4 %
NEUTROPHILS ABSOLUTE: 1.8 K/UL (ref 1.7–7.7)
NEUTROPHILS RELATIVE PERCENT: 70.7 %
O2 SAT, VEN: 64 %
PCO2, VEN: 36.7 MMHG (ref 41–51)
PDW BLD-RTO: 12.6 % (ref 12.4–15.4)
PH VENOUS: 7.42 (ref 7.35–7.45)
PLATELET # BLD: 93 K/UL (ref 135–450)
PMV BLD AUTO: 7.9 FL (ref 5–10.5)
PO2, VEN: 31.7 MMHG (ref 25–40)
POTASSIUM REFLEX MAGNESIUM: 4.6 MMOL/L (ref 3.5–5.1)
PROCALCITONIN: 0.12 NG/ML (ref 0–0.15)
RBC # BLD: 4.31 M/UL (ref 4.2–5.9)
SODIUM BLD-SCNC: 127 MMOL/L (ref 136–145)
TCO2 CALC VENOUS: 25 MMOL/L
TOTAL CK: 47 U/L (ref 39–308)
TOTAL PROTEIN: 6.7 G/DL (ref 6.4–8.2)
WBC # BLD: 2.6 K/UL (ref 4–11)

## 2021-10-12 PROCEDURE — 80053 COMPREHEN METABOLIC PANEL: CPT

## 2021-10-12 PROCEDURE — 82550 ASSAY OF CK (CPK): CPT

## 2021-10-12 PROCEDURE — 2580000003 HC RX 258: Performed by: EMERGENCY MEDICINE

## 2021-10-12 PROCEDURE — 36415 COLL VENOUS BLD VENIPUNCTURE: CPT

## 2021-10-12 PROCEDURE — 71045 X-RAY EXAM CHEST 1 VIEW: CPT

## 2021-10-12 PROCEDURE — 99283 EMERGENCY DEPT VISIT LOW MDM: CPT

## 2021-10-12 PROCEDURE — 85025 COMPLETE CBC W/AUTO DIFF WBC: CPT

## 2021-10-12 PROCEDURE — 93005 ELECTROCARDIOGRAM TRACING: CPT | Performed by: EMERGENCY MEDICINE

## 2021-10-12 PROCEDURE — 83605 ASSAY OF LACTIC ACID: CPT

## 2021-10-12 PROCEDURE — 6360000002 HC RX W HCPCS: Performed by: EMERGENCY MEDICINE

## 2021-10-12 PROCEDURE — 96374 THER/PROPH/DIAG INJ IV PUSH: CPT

## 2021-10-12 PROCEDURE — 84145 PROCALCITONIN (PCT): CPT

## 2021-10-12 PROCEDURE — 82803 BLOOD GASES ANY COMBINATION: CPT

## 2021-10-12 RX ORDER — SODIUM CHLORIDE, SODIUM LACTATE, POTASSIUM CHLORIDE, AND CALCIUM CHLORIDE .6; .31; .03; .02 G/100ML; G/100ML; G/100ML; G/100ML
1000 INJECTION, SOLUTION INTRAVENOUS ONCE
Status: COMPLETED | OUTPATIENT
Start: 2021-10-12 | End: 2021-10-12

## 2021-10-12 RX ORDER — ONDANSETRON 2 MG/ML
8 INJECTION INTRAMUSCULAR; INTRAVENOUS ONCE
Status: COMPLETED | OUTPATIENT
Start: 2021-10-12 | End: 2021-10-12

## 2021-10-12 RX ADMIN — ONDANSETRON 8 MG: 2 INJECTION INTRAMUSCULAR; INTRAVENOUS at 14:07

## 2021-10-12 RX ADMIN — SODIUM CHLORIDE, SODIUM LACTATE, POTASSIUM CHLORIDE, AND CALCIUM CHLORIDE 1000 ML: .6; .31; .03; .02 INJECTION, SOLUTION INTRAVENOUS at 14:06

## 2021-10-12 NOTE — CARE COORDINATION
Patient LM on this AC VM last evening, Return call, no answer, LM on VM with this AC name and number to please call.

## 2021-10-12 NOTE — ED PROVIDER NOTES
[address] [facility]          ATTENDING PHYSICIAN NOTE       Date of evaluation: 10/12/2021    Chief Complaint     Positive For Covid-19 (recieved COVID vaccine on Monday and felt sick for 3 days, tested positive on Thursday, since has felt very weak and unsteady), Dizziness, and Abnormal Lab (had bloodwork on Thursday and Saturday and was told at NA was low, feel confused)      History of Present Illness     Nghia Berumen is a 63 y.o. male with history of diabetes, recent diagnosis of Covid presenting to the emergency department today for fatigue, dizziness, and ongoing Covid symptoms. Patient states he has felt slightly better in terms of body aches and fatigue over the past day, but continues to feel lightheaded with ambulation. He is having difficulty cooking and caring for himself and lives alone. He denies any ongoing fevers or worsening shortness of breath. Does have a productive cough, uncertain of color. Has been seen twice in recent days for reevaluation and has had noted hyponatremia and findings consistent with SIADH. Review of Systems     Pertinent positive and negative findings as documented in the HPI. Otherwise all other systems were reviewed and were negative. Physical Exam     INITIAL VITALS: BP: 130/76, Temp: 98.1 °F (36.7 °C), Pulse: 80, Resp: 23, SpO2: 93 %     Nursing note and vitals reviewed. General:  Adult male, alert and appropriately interactive. In no distress. HENT: Normocephalic and atraumatic. External ears normal. Nose appears normal externally. Eyes: Conjunctivae normal. No scleral icterus. Neck: Neck supple. No tracheal deviation present. CV: Normal rate. Regular rhythm. S1/S2 auscultated. No murmurs, gallops or rubs. Pulm: Effort normal. Breath sounds clear to auscultation bilaterally. No wheezes. No rales or rhonchi. GI: Soft. No distension. No tenderness. No rebound or guarding. No masses. No peritoneal signs. Musculoskeletal: No edema. No gross deformities. Neurological: Alert and appropriately interactive. Face symmetric, speech without dysarthria or obvious aphasia. Moving all extremities spontaneously. Skin: Warm, dry. No rash. No diaphoresis or erythema. Psychiatric: Calm and cooperative with appropriate mood and affect. Procedures   Procedures    MEDICAL DECISION MAKING     MDM: Brisa Brothers is a 63 y.o. male with history as above presenting to the emergency department today for reexamination in the setting of ongoing Covid symptoms. On arrival, he is in no distress and vital signs are reassuring. His pulmonary exam is clear and abdominal exam is benign. He is overall nontoxic-appearing. He is quite concerned about his sodium levels after recent checks, and we will repeat laboratory studies today. He does have an ongoing leukopenia and lymphopenia that is stable with a slightly improved thrombocytopenia. His lactate is normal and his blood gas is reassuring. Procalcitonin would suggest solely ongoing viral process, no superimposed bacterial infection. He does have an ongoing mild hyponatremia, corrected sodium of 130 is stable from previous. His chest x-ray does demonstrate some new infiltrates consistent with viral pneumonia as well, however he has no shortness of breath and is saturating well on room air. He is able to ambulate as well without desaturation. He does feel weak overall, and is requesting assistance with his recovery. He has no objective markers to require inpatient stay at this time. I have consulted case management to assist with some home care needs if possible. We will also provide him with a home pulse oximeter. I did additionally speak with his PCP who is in agreement with reassurance from these labs and emphasized glucose control. Patient will arrange a virtual visit in approximately 1 week for recheck and strict return precautions provided.   All questions answered to his satisfaction. Discharged in stable condition with written and verbal instructions for which to return to the ED. Clinical Impression     1. COVID-19    2. Hyperglycemia due to diabetes mellitus [facility])        Disposition     DISPOSITION Decision To Discharge 10/12/2021 04:00:00 PM        Cecilia Singh MD  4:16 PM                     Past Medical, Surgical, Family, and Social History     He has a past medical history of Acid reflux, Bradycardia, Cardiomyopathy ([address] CHB (complete heart block) ([address] CHF (congestive heart failure) ([facility] Hypertension, ICD (implantable cardioverter-defibrillator) battery depletion, and Type 2 diabetes mellitus without complication, without long-term current use of insulin ([facility] [address] He has a past surgical history that includes Cardiac pacemaker placement; pacemaker placement; Cosmetic surgery; and Tonsillectomy. His family history includes Arrhythmia in his mother; Arthritis in his mother; Diabetes in his father. He reports that he has never smoked. He has never used smokeless tobacco. He reports that he does not drink alcohol and does not use drugs. Medications     Previous Medications    BLOOD GLUCOSE MONITORING SUPPL (TRUE METRIX AIR GLUCOSE METER) DANGELO    Check sugar twice a day    BLOOD GLUCOSE TEST STRIPS (TRUE METRIX BLOOD GLUCOSE TEST) STRIP    1 each by In Vitro route 2 times daily As needed. CLINDAMYCIN (CLEOCIN) 300 MG CAPSULE    Take 1 capsule by mouth 4 times daily for 7 days    FAMOTIDINE (PEPCID) 20 MG TABLET    Take 20 mg by mouth nightly as needed. LANCETS MISC    1 each by Does not apply route 2 times daily    MAGNESIUM OXIDE 500 MG TABS    Take 500 mg by mouth daily    MELOXICAM (MOBIC) 15 MG TABLET    Take 0.5 tablets by mouth 2 times daily for 10 days    METFORMIN (GLUCOPHAGE) 500 MG TABLET    1 tablet twice a day for 1 week and then 1tab  3 times a day and then 2 tablets twice a day with food.     METOPROLOL SUCCINATE (TOPROL XL) 100 MG EXTENDED RELEASE TABLET    Take 1 tablet by mouth daily    ONDANSETRON (ZOFRAN ODT) 4 MG DISINTEGRATING TABLET    Take 1 tablet by mouth every 8 hours as needed for Nausea    PIOGLITAZONE (ACTOS) 30 MG TABLET    Take 1 tablet by mouth daily    PROMETHAZINE (PHENERGAN) 25 MG TABLET    Take 1 tablet by mouth every 6 hours as needed for Nausea WARNING:  May cause drowsiness. May impair ability to operate vehicles or machinery. Do not use in combination with alcohol. TIZANIDINE (ZANAFLEX) 4 MG TABLET    Take 0.5 tablets by mouth 2 times daily Watch drowsiness       Allergies     He is allergic to doxycycline, lisinopril, losartan, septra [sulfamethoxazole-trimethoprim], sulfa antibiotics, and tetracyclines & related. ED Course     Nursing Notes, Past Medical Hx, Past Surgical Hx, Social Hx,Allergies, and Family Hx were reviewed. Patient was given the following medications:  Orders Placed This Encounter   Medications    lactated ringers bolus    ondansetron (ZOFRAN) injection 8 mg       Diagnostic Results     EKG   Atrial sensed, ventricularly paced rhythm, rate 78. QRS widening consistent with ventricular pacing, remaining intervals normal.  Axis left deviated. No ST or T wave changes suggestive of acute ischemia, negative Sgarbossa criteria. RECENT VITALS:  BP: 130/76,Temp: 98.1 °F (36.7 °C), Pulse: 80, Resp: 23, SpO2: 93 %     RADIOLOGY:  XR CHEST PORTABLE   Final Result      Patchy consolidation in the right lower and left midlung most compatible with viral pneumonia. This is new since 10/9/2021. Normal cardiomediastinal silhouette noting left subclavian ICD.           LABS:   Results for orders placed or performed during the hospital encounter of 10/12/21   CBC Auto Differential   Result Value Ref Range    WBC 2.6 (L) 4.0 - 11.0 K/uL    RBC 4.31 4.20 - 5.90 M/uL    Hemoglobin 13.1 (L) 13.5 - 17.5 g/dL    Hematocrit 37.9 (L) 40.5 - 52.5 %    MCV 88.0 80.0 - 100.0 fL    MCH 30.3 26.0 - 34.0 pg    MCHC 34.5 31.0 - 36.0 g/dL    RDW 12.6 12.4 - 15.4 %    Platelets 93 (L) 028 - 450 K/uL    MPV 7.9 5.0 - 10.5 fL    Neutrophils % 70.7 %    Lymphocytes % 16.4 %    Monocytes % 12.4 %    Eosinophils % 0.0 %    Basophils % 0.5 %    Neutrophils Absolute 1.8 1.7 - 7.7 K/uL    Lymphocytes Absolute 0.4 (L) 1.0 - 5.1 K/uL    Monocytes Absolute 0.3 0.0 - 1.3 K/uL    Eosinophils Absolute 0.0 0.0 - 0.6 K/uL    Basophils Absolute 0.0 0.0 - 0.2 K/uL   Comprehensive Metabolic Panel w/ Reflex to MG   Result Value Ref Range    Sodium 127 (L) 136 - 145 mmol/L    Potassium reflex Magnesium 4.6 3.5 - 5.1 mmol/L    Chloride 95 (L) 99 - 110 mmol/L    CO2 20 (L) 21 - 32 mmol/L    Anion Gap 12 3 - 16    Glucose 307 (H) 70 - 99 mg/dL    BUN 17 7 - 20 mg/dL    CREATININE 0.9 0.9 - 1.3 mg/dL    GFR Non-African American >60 >60    GFR African American >60 >60    Calcium 8.0 (L) 8.3 - 10.6 mg/dL    Total Protein 6.7 6.4 - 8.2 g/dL    Albumin 3.2 (L) 3.4 - 5.0 g/dL    Albumin/Globulin Ratio 0.9 (L) 1.1 - 2.2    Total Bilirubin 0.6 0.0 - 1.0 mg/dL    Alkaline Phosphatase 46 40 - 129 U/L    ALT 34 10 - 40 U/L    AST 49 (H) 15 - 37 U/L    Globulin 3.5 Not Established g/dL   Procalcitonin   Result Value Ref Range    Procalcitonin 0.12 0.00 - 0.15 ng/mL   Lactate, Sepsis   Result Value Ref Range    Lactic Acid, Sepsis 1.4 0.4 - 1.9 mmol/L   Blood Gas, Venous   Result Value Ref Range    pH, Cj 7.425 7.350 - 7.450    pCO2, Cj 36.7 (L) 41.0 - 51.0 mmHg    pO2, Cj 31.7 25 - 40 mmHg    HCO3, Venous 24.1 24.0 - 28.0 mmol/L    Base Excess, Cj 0.0 -2.0 - 3.0 mmol/L    O2 Sat, Cj 64 Not established %    Carboxyhemoglobin 1.3 0.0 - 1.5 %    MetHgb, Cj 0.4 0.0 - 1.5 %    TC02 (Calc), Cj 25 mmol/L    Hemoglobin, Cj, Reduced 35.20 %   CK   Result Value Ref Range    Total CK 47 39 - 308 U/L   EKG 12 Lead   Result Value Ref Range    Ventricular Rate 78 BPM    Atrial Rate 78 BPM    P-R Interval 128 ms    QRS Duration 148 ms    Q-T Interval 438 ms    QTc Calculation ([place]) 499 ms    P Axis 54 degrees    R Axis 221 degrees    T Axis 66 degrees    Diagnosis       EKG performed in ER and to be interpreted by ER physician. Confirmed by MD, ER ([id number]),  [address]aeed, [facility] ([id number]) on 10/12/2021 2:28:14 PM       CONSULTS:  [address] CONSULT TO PRIMARY CARE PROVIDER  IP CONSULT TO HOME CARE NEEDS    PATIENT REFERRED TO:  [facility] Emergency Department  [address] [place] [address]  [phone]    If symptoms worsen    [address]  [phone]    Schedule an appointment as soon as possible for a visit in 1 week  For reexamination      DISCHARGE MEDICATIONS:  New Prescriptions    No medications on file          Reji Evangelista MD  10/12/21 0690

## 2021-10-12 NOTE — CARE COORDINATION
Patient contacted regarding COVID-19 diagnosis. Discussed COVID-19 related testing which was available at this time. Test results were positive. Patient informed of results, if available? Yes. Ambulatory Care Manager contacted the patient by telephone to perform post discharge assessment. Call within 2 business days of discharge: Yes. Verified name and  with patient as identifiers. Provided introduction to self, and explanation of the CTN/ACM role, and reason for call due to risk factors for infection and/or exposure to COVID-19. Symptoms reviewed with patient who verbalized the following symptoms: fatigue, chills or shaking, nausea and dizziness/lightheadedness. Due to worsening symptoms encounter was not routed to provider for escalation. Discussed follow-up appointments. If no appointment was previously scheduled, appointment scheduling offered: Yes. [facility] follow up appointment(s):   Future Appointments   Date Time Provider Isela Wasserman   1/10/2022  8:55 AM SCHEDULE, Vicci Ort REMOTE TRANSMISSION [place] [facility]   3/24/2022  2:30 PM SCHEDULE, [place] DEVICE CHECK [place] [facility]   3/24/2022  3:00 PM Carmelo Goff MD [place] [facility]   2022  8:55 AM SCHEDULE, [place] REMOTE TRANSMISSION [place] [facility]   2022  8:55 AM SCHEDULE, [place] REMOTE TRANSMISSION [place] [facility] follow up appointment(s):     Non-face-to-face services provided:  Obtained and reviewed discharge summary and/or continuity of care documents  Reviewed and followed up on pending diagnostic tests and treatments-covid  Education of patient/family/caregiver/guardian to support self-management-sx mgt     Advance Care Planning:   Does patient have an Advance Directive:  not on file. Educated patient about risk for severe COVID-19 due to risk factors according to CDC guidelines.  ACM reviewed discharge instructions, medical action plan and red flag symptoms with the patient who verbalized understanding. Discussed COVID vaccination status: Yes. Education provided on COVID-19 vaccination as appropriate. Discussed exposure protocols and quarantine with CDC Guidelines. Patient was given an opportunity to verbalize any questions and concerns and agrees to contact ACM or health care provider for questions related to their healthcare. Reviewed and educated patient on any new and changed medications related to discharge diagnosis     Was patient discharged with a pulse oximeter? No Discussed and confirmed pulse oximeter discharge instructions and when to notify provider or seek emergency care. ACM provided contact information. patient sent to ER per pcp and concerns re sx and hyponatremia based on severity of symptoms and risk factors. Patient had first [facility] covid vaccination on Mon 10/4,   May have been exposed to someone w covid  ER 10/7 for body aches, fatigue,   Return to ER 10/9 for fatigue, nausea and trembling- dx hyponatremia from Na 131- 128    Nephrology consult- limit water to 32 oz, rec gatorade zero  and DM ensure - as BG was 288- 234   Also found to have abscess requiring I/D    Call to PCP today due to continued sx and feeling very weak and nauseated, wiped out after getting up to bathroom.   Reports not eating well  Gatorade Zero and Ensure-- could not find Glucerna   Started Actos, but pharm did not have metformin  Denies diarrhea, vomiting  No pulse ox at home denies SOB, but indicates chest tightness after drinking  Productive cough    Patient has friend to take him to ER, will wear mask and take necessary hand hygiene and distancing  precautions

## 2021-10-12 NOTE — ED NOTES
Ambulated patient with pulsox around the room  Results were:  02 93  Heart rate 95.      Lexine Nettle  10/12/21 1422

## 2021-10-12 NOTE — ED NOTES
Bed: B17-17  Expected date:   Expected time:   Means of arrival:   Comments:  Next [address], RN  10/12/21 1212

## 2021-10-12 NOTE — CARE COORDINATION
CTN contacted Odilia with [employer] [phone]. They have accepted this patient and will pull referral from [facility].  They will contact patient and make arrangements for [facility] by 10/14  Electronically signed by Farzaneh Conway LPN on 70/40/0006 at 4:30 PM

## 2021-10-12 NOTE — CARE COORDINATION
CM consult noted for this pt. Met with him at the bedside in the emergency department. Per review of the chart he is COVID+. He is alert and oriented x 4. Some pressured speech. Anxious. Pt stated he lives at home alone in his own house. Prior to the COVID diagnosis, he was completely independent with ADL's, driving, and is employed. Discussed the disease symptoms and much of what he is experiencing is expected with the viral illness. He does not have a new oxygen requirement. He does not meet criteria of \"home bound\" and any [facility] services may require a co-pay with his [employer] insurance. Assured him the insurance company would let him know up front about costs. Discussed the pt's situation with Dr. Megan Zamora. Waiting for clinical decision-making to move forward with DC planning needs. Josi Dee RN  Case Mangement  [phone]    Addendum 0714  Orders received from Dr. Megan Zamora for a [facility] AT [facility]. Will contact agencies to determine who is in network. Pt will be discharged home.

## 2021-10-12 NOTE — DISCHARGE INSTR - COC
Continuity of Care Form    Patient Name: Nghia Berumen   :  1960  MRN:  [id number]    Admit date:  10/12/2021  Discharge date:  ***    Code Status Order: Prior   Advance Directives:     Admitting Physician:  No admitting provider for patient encounter. PCP: Mary Colmenares MD    Discharging Nurse: [facility] Unit/Room#: Q02/K12-04  Discharging Unit Phone Number: ***    Emergency Contact:   Extended Emergency Contact Information  Primary Emergency Contact: [address] Phone: [phone]  Relation: Other    Past Surgical History:  Past Surgical History:   Procedure Laterality Date    CARDIAC PACEMAKER PLACEMENT      COSMETIC SURGERY      PACEMAKER PLACEMENT      TONSILLECTOMY         Immunization History:   Immunization History   Administered Date(s) Administered    COVID-19, Moderna, PF, 100mcg/0.5mL 10/04/2021       Active Problems:  Patient Active Problem List   Diagnosis Code    Bradycardia R00.1    SOB (shortness of breath) R06.02    CHB (complete heart block) ([facility]) I44.2    HTN (hypertension) I10    Chronic systolic heart failure ([facility]) I50.22    Cardiomyopathy ([facility] I42.9    Obesity (BMI 30.0-34. 9) E66.9    Biventricular automatic implantable cardioverter defibrillator in situ Z95.810    Obstructive sleep apnea syndrome G47.33    Type 2 diabetes mellitus without complication, without long-term current use of insulin ([facility]) E11.9    Boil of lower extremity L02.429    Myalgia M79.10    COVID-19 U07.1    Hyponatremia E87.1       Isolation/Infection:   Isolation          No Isolation        Patient Infection Status     Infection Onset Added Last Indicated Last Indicated By Review Planned Expiration Resolved Resolved By    COVID-19 10/08/21 10/08/21 10/08/21 COVID-19 10/15/21 10/22/21      Resolved    COVID-19 Rule Out 10/07/21 10/07/21 10/08/21 COVID-19 (Ordered)   10/08/21 Rule-Out Test Resulted          Nurse Assessment:  Last Vital Signs: /76   Pulse 80 Temp 98.1 °F (36.7 °C) (Oral)   Resp 23   Ht 5' 10\" (1.778 m)   Wt 210 lb (95.3 kg)   SpO2 93%   BMI 30.13 kg/m²     Last documented pain score (0-10 scale):    Last Weight:   Wt Readings from Last 1 Encounters:   10/12/21 210 lb (95.3 kg)     Mental Status:  {IP PT MENTAL STATUS:}    IV Access:  { VIOLETTA IV ACCESS:052959195}    Nursing Mobility/ADLs:  Walking   {CHP DME ADDD:051865039}  Transfer  {CHP DME IGHO:360436371}  Bathing  {CHP DME ONST:918795328}  Dressing  {CHP DME YWGT:395526880}  Toileting  {CHP DME CQHI:406952894}  Feeding  {CHP DME ADKW:236883465}  Med Admin  {CHP DME PSWJ:554085284}  Med Delivery   { VIOLETTA MED Delivery:570052695}    Wound Care Documentation and Therapy:        Elimination:  Continence:   · Bowel: {YES / SZ:70775}  · Bladder: {YES / ZY:99221}  Urinary Catheter: {Urinary Catheter:132415148}   Colostomy/Ileostomy/Ileal Conduit: {YES / OU:08059}       Date of Last BM: ***  No intake or output data in the 24 hours ending 10/12/21 1625  No intake/output data recorded.     Safety Concerns:     [address] Safety Concerns:492689929}    Impairments/Disabilities:      [address] Impairments/Disabilities:357251982}    Nutrition Therapy:  Current Nutrition Therapy:   [address] Diet List:335772762}    Routes of Feeding: {CHP DME Other Feedings:475241715}  Liquids: {Slp liquid thickness:25492}  Daily Fluid Restriction: {CHP DME Yes amt example:206618864}  Last Modified Barium Swallow with Video (Video Swallowing Test): {Done Not Done HVXC:775341775}    Treatments at the Time of Hospital Discharge:   Respiratory Treatments: ***  Oxygen Therapy:  {Therapy; copd oxygen:21395}  Ventilator:    { CC Vent IZKL:832124561}    Rehab Therapies: {THERAPEUTIC INTERVENTION:4753258811}  Weight Bearing Status/Restrictions: [address] WRAY Weight Bearin}  Other Medical Equipment (for information only, NOT a DME order):  {EQUIPMENT:452164102}  Other Treatments: ***    Patient's personal belongings (please select all that are sent with patient):  {CHP DME Belongings:256347116}    RN SIGNATURE:  {Esignature:081720659}    CASE MANAGEMENT/SOCIAL WORK SECTION    Inpatient Status Date: ***    Readmission Risk Assessment Score:  Readmission Risk              Risk of Unplanned Readmission:  0           Discharging to Facility/ Agency   · Name:   · Address:  · Phone:  · Fax:    Dialysis Facility (if applicable)   · Name:  · Address:  · Dialysis Schedule:  · Phone:  · Fax:    / signature: {Esignature:324720860}    PHYSICIAN SECTION    Prognosis: {Prognosis:5355819869}    Condition at Discharge: [address] [place] [address] Patient Condition:392963701}    Rehab Potential (if transferring to Rehab): {Prognosis:2305937981}    Recommended Labs or Other Treatments After Discharge: ***    Physician Certification: I certify the above information and transfer of Amanda Quevedo  is necessary for the continuing treatment of the diagnosis listed and that he requires {Admit to Appropriate Level of Care:81776} for {GREATER/LESS:720884113} 30 days.      Update Admission H&P: {CHP DME Changes in ZRVT}    PHYSICIAN SIGNATURE:  {Esignature:282496806}

## 2021-10-13 ENCOUNTER — CARE COORDINATION (OUTPATIENT)
Dept: CARE COORDINATION | Age: 61
End: 2021-10-13

## 2021-10-13 ENCOUNTER — TELEPHONE (OUTPATIENT)
Dept: CARDIOLOGY CLINIC | Age: 61
End: 2021-10-13

## 2021-10-13 ENCOUNTER — TELEPHONE (OUTPATIENT)
Dept: INTERNAL MEDICINE CLINIC | Age: 61
End: 2021-10-13

## 2021-10-13 ENCOUNTER — HOSPITAL ENCOUNTER (EMERGENCY)
Age: 61
Discharge: HOME | End: 2021-10-13
Attending: STUDENT IN AN ORGANIZED HEALTH CARE EDUCATION/TRAINING PROGRAM
Payer: COMMERCIAL

## 2021-10-13 VITALS
DIASTOLIC BLOOD PRESSURE: 71 MMHG | SYSTOLIC BLOOD PRESSURE: 124 MMHG | HEART RATE: 74 BPM | RESPIRATION RATE: 16 BRPM | OXYGEN SATURATION: 95 % | TEMPERATURE: 99.3 F

## 2021-10-13 DIAGNOSIS — U07.1 COVID-19: Primary | ICD-10-CM

## 2021-10-13 LAB
ANION GAP SERPL CALCULATED.3IONS-SCNC: 15 MMOL/L (ref 3–16)
BASOPHILS ABSOLUTE: 0 K/UL (ref 0–0.2)
BASOPHILS RELATIVE PERCENT: 0.3 %
BUN BLDV-MCNC: 14 MG/DL (ref 7–20)
CALCIUM SERPL-MCNC: 8.4 MG/DL (ref 8.3–10.6)
CHLORIDE BLD-SCNC: 94 MMOL/L (ref 99–110)
CO2: 20 MMOL/L (ref 21–32)
CREAT SERPL-MCNC: 0.8 MG/DL (ref 0.9–1.3)
EOSINOPHILS ABSOLUTE: 0 K/UL (ref 0–0.6)
EOSINOPHILS RELATIVE PERCENT: 0 %
GFR AFRICAN AMERICAN: >60
GFR NON-AFRICAN AMERICAN: >60
GLUCOSE BLD-MCNC: 213 MG/DL (ref 70–99)
HCT VFR BLD CALC: 41.8 % (ref 40.5–52.5)
HEMOGLOBIN: 14.5 G/DL (ref 13.5–17.5)
LYMPHOCYTES ABSOLUTE: 0.7 K/UL (ref 1–5.1)
LYMPHOCYTES RELATIVE PERCENT: 22.4 %
MCH RBC QN AUTO: 30.6 PG (ref 26–34)
MCHC RBC AUTO-ENTMCNC: 34.7 G/DL (ref 31–36)
MCV RBC AUTO: 88.1 FL (ref 80–100)
MONOCYTES ABSOLUTE: 0.3 K/UL (ref 0–1.3)
MONOCYTES RELATIVE PERCENT: 8.9 %
NEUTROPHILS ABSOLUTE: 2 K/UL (ref 1.7–7.7)
NEUTROPHILS RELATIVE PERCENT: 68.4 %
PDW BLD-RTO: 12.5 % (ref 12.4–15.4)
PLATELET # BLD: 111 K/UL (ref 135–450)
PMV BLD AUTO: 7.8 FL (ref 5–10.5)
POTASSIUM REFLEX MAGNESIUM: 4.2 MMOL/L (ref 3.5–5.1)
RBC # BLD: 4.74 M/UL (ref 4.2–5.9)
SODIUM BLD-SCNC: 129 MMOL/L (ref 136–145)
WBC # BLD: 3 K/UL (ref 4–11)

## 2021-10-13 PROCEDURE — 2580000003 HC RX 258: Performed by: PHYSICIAN ASSISTANT

## 2021-10-13 PROCEDURE — 6370000000 HC RX 637 (ALT 250 FOR IP): Performed by: PHYSICIAN ASSISTANT

## 2021-10-13 PROCEDURE — 99283 EMERGENCY DEPT VISIT LOW MDM: CPT

## 2021-10-13 PROCEDURE — 96374 THER/PROPH/DIAG INJ IV PUSH: CPT

## 2021-10-13 PROCEDURE — 96361 HYDRATE IV INFUSION ADD-ON: CPT

## 2021-10-13 PROCEDURE — 96360 HYDRATION IV INFUSION INIT: CPT

## 2021-10-13 PROCEDURE — 6360000002 HC RX W HCPCS: Performed by: PHYSICIAN ASSISTANT

## 2021-10-13 PROCEDURE — 80048 BASIC METABOLIC PNL TOTAL CA: CPT

## 2021-10-13 PROCEDURE — 85025 COMPLETE CBC W/AUTO DIFF WBC: CPT

## 2021-10-13 RX ORDER — 0.9 % SODIUM CHLORIDE 0.9 %
1000 INTRAVENOUS SOLUTION INTRAVENOUS ONCE
Status: COMPLETED | OUTPATIENT
Start: 2021-10-13 | End: 2021-10-13

## 2021-10-13 RX ORDER — ACETAMINOPHEN 325 MG/1
650 TABLET ORAL ONCE
Status: COMPLETED | OUTPATIENT
Start: 2021-10-13 | End: 2021-10-13

## 2021-10-13 RX ORDER — ONDANSETRON 2 MG/ML
4 INJECTION INTRAMUSCULAR; INTRAVENOUS ONCE
Status: COMPLETED | OUTPATIENT
Start: 2021-10-13 | End: 2021-10-13

## 2021-10-13 RX ADMIN — SODIUM CHLORIDE 1000 ML: 9 INJECTION, SOLUTION INTRAVENOUS at 14:37

## 2021-10-13 RX ADMIN — ONDANSETRON 4 MG: 2 INJECTION INTRAMUSCULAR; INTRAVENOUS at 17:26

## 2021-10-13 RX ADMIN — ACETAMINOPHEN 650 MG: 325 TABLET ORAL at 17:26

## 2021-10-13 ASSESSMENT — PAIN SCALES - WONG BAKER: WONGBAKER_NUMERICALRESPONSE: 8

## 2021-10-13 ASSESSMENT — PAIN SCALES - GENERAL: PAINLEVEL_OUTOF10: 8

## 2021-10-13 ASSESSMENT — PAIN - FUNCTIONAL ASSESSMENT: PAIN_FUNCTIONAL_ASSESSMENT: PREVENTS OR INTERFERES SOME ACTIVE ACTIVITIES AND ADLS

## 2021-10-13 ASSESSMENT — ENCOUNTER SYMPTOMS
CHEST TIGHTNESS: 0
VOMITING: 0
SHORTNESS OF BREATH: 1
DIARRHEA: 1
NAUSEA: 1

## 2021-10-13 ASSESSMENT — PAIN DESCRIPTION - FREQUENCY: FREQUENCY: CONTINUOUS

## 2021-10-13 ASSESSMENT — PAIN DESCRIPTION - DESCRIPTORS: DESCRIPTORS: DISCOMFORT

## 2021-10-13 ASSESSMENT — PAIN DESCRIPTION - PROGRESSION: CLINICAL_PROGRESSION: GRADUALLY WORSENING

## 2021-10-13 ASSESSMENT — PAIN DESCRIPTION - PAIN TYPE: TYPE: ACUTE PAIN

## 2021-10-13 ASSESSMENT — PAIN DESCRIPTION - LOCATION: LOCATION: GENERALIZED

## 2021-10-13 ASSESSMENT — PAIN DESCRIPTION - ONSET: ONSET: PROGRESSIVE

## 2021-10-13 NOTE — TELEPHONE ENCOUNTER
[facility] called to get a clarification on the sig for the following medication. The pharmacy would like to know after how many days should each dose be increased. metFORMIN (GLUCOPHAGE) 500 MG tablet       Please advise.

## 2021-10-13 NOTE — ED NOTES
Pt has d/c order. D/C instructions given. Pt verbalized understanding. Pt out to lobby.        Arlyn Lopez RN  10/13/21 4431

## 2021-10-13 NOTE — TELEPHONE ENCOUNTER
Chart reviewed. Pt is in the ED at [facility] as of right now. Chart also states pt was in the ED yesterday and DC in stable condition. This is his appropriate place for this evaluation to take place as Dr. Sandi Wheeler cannot admit for this illness and she is OOT. Per chart, he has a plan in place in the ED if he has further issues. Left message for pt.

## 2021-10-13 NOTE — ED PROVIDER NOTES
[address] [facility] [address] ENCOUNTER          PHYSICIAN ASSISTANT NOTE       Date of evaluation: 10/13/2021    Chief Complaint     Positive For Covid-19 and Diarrhea      History of Present Illness     Zaheer Ireland is a 63 y.o. male with a history of diabetes and a diagnosis of COVID-19 five days ago who has been here in the emergency department 5 out of the last 6 days with various concerns and complaints regarding his diagnosis and symptoms. He was most recently seen here yesterday with a work-up including stable leukopenia and lymphopenia, normal lactate and VBG. He did exhibit hyponatremia corrected to 130 which was improved from prior. Chest x-ray consistent with COVID-19. He was able to ambulate without desaturation and was eventually discharged home with home health aide, a home pulse oximeter and consultation with PCP who agreed with this plan. He returns today reporting that he was told to come here by his home health aide nurse because his home pulse oximeter showed O2 saturation of 88 to 89%. He also reports that his shortness of breath has become mildly worse since yesterday and is exacerbated when he walks around. Walking makes him feel as if he has to sit down. He also reports shaking in his hands it due to perceived weakness. He also reports increased and nonbloody diarrhea. He otherwise continues to endorse symptoms he has had prior including generalized weakness, fatigue, scratchy throat, headaches, subjective fevers, nausea. He denies chest pain, vomiting, urinary symptoms. Review of Systems     Review of Systems   Constitutional: Positive for chills and fatigue. Respiratory: Positive for shortness of breath. Negative for chest tightness. Cardiovascular: Negative for chest pain. Gastrointestinal: Positive for diarrhea and nausea. Negative for vomiting. Genitourinary: Negative for dysuria. Musculoskeletal: Positive for myalgias.    Neurological: Positive for headaches. Negative for dizziness. Past Medical, Surgical, Family, and Social History     He has a past medical history of Acid reflux, Bradycardia, Cardiomyopathy ([address] CHB (complete heart block) ([address] CHF (congestive heart failure) ([facility] [address] Hypertension, ICD (implantable cardioverter-defibrillator) battery depletion, and Type 2 diabetes mellitus without complication, without long-term current use of insulin ([facility] [address] He has a past surgical history that includes Cardiac pacemaker placement; pacemaker placement; Cosmetic surgery; and Tonsillectomy. His family history includes Arrhythmia in his mother; Arthritis in his mother; Diabetes in his father. He reports that he has never smoked. He has never used smokeless tobacco. He reports that he does not drink alcohol and does not use drugs. Medications     Previous Medications    BLOOD GLUCOSE MONITORING SUPPL (TRUE METRIX AIR GLUCOSE METER) DANGELO    Check sugar twice a day    BLOOD GLUCOSE TEST STRIPS (TRUE METRIX BLOOD GLUCOSE TEST) STRIP    1 each by In Vitro route 2 times daily As needed. CLINDAMYCIN (CLEOCIN) 300 MG CAPSULE    Take 1 capsule by mouth 4 times daily for 7 days    FAMOTIDINE (PEPCID) 20 MG TABLET    Take 20 mg by mouth nightly as needed. LANCETS MISC    1 each by Does not apply route 2 times daily    MAGNESIUM OXIDE 500 MG TABS    Take 500 mg by mouth daily    MELOXICAM (MOBIC) 15 MG TABLET    Take 0.5 tablets by mouth 2 times daily for 10 days    METFORMIN (GLUCOPHAGE) 500 MG TABLET    1 tablet twice a day for 1 week and then 1tab  3 times a day and then 2 tablets twice a day with food.     METOPROLOL SUCCINATE (TOPROL XL) 100 MG EXTENDED RELEASE TABLET    Take 1 tablet by mouth daily    ONDANSETRON (ZOFRAN ODT) 4 MG DISINTEGRATING TABLET    Take 1 tablet by mouth every 8 hours as needed for Nausea    PIOGLITAZONE (ACTOS) 30 MG TABLET    Take 1 tablet by mouth daily    PROMETHAZINE (PHENERGAN) 25 MG TABLET    Take 1 tablet by mouth every 6 hours as needed for Nausea WARNING:  May cause drowsiness. May impair ability to operate vehicles or machinery. Do not use in combination with alcohol. TIZANIDINE (ZANAFLEX) 4 MG TABLET    Take 0.5 tablets by mouth 2 times daily Watch drowsiness       Allergies     He is allergic to doxycycline, lisinopril, losartan, septra [sulfamethoxazole-trimethoprim], sulfa antibiotics, and tetracyclines & related. Physical Exam     INITIAL VITALS: BP: 124/71, Temp: 99.3 °F (37.4 °C), Pulse: 78, Resp: 16, SpO2: 92 %  Physical Exam  Constitutional:       Appearance: Normal appearance. HENT:      Head: Normocephalic and atraumatic. Comments: The patient's posterior oropharynx is moderately erythematous without exudates. Uvula is midline. Patient is moving secretions without difficulty. There is no swelling present. Cardiovascular:      Rate and Rhythm: Regular rhythm. Tachycardia present. Pulses: Normal pulses. Heart sounds: Normal heart sounds. Pulmonary:      Effort: Pulmonary effort is normal.      Breath sounds: Normal breath sounds. Musculoskeletal:         General: Normal range of motion. Cervical back: Normal range of motion. Skin:     General: Skin is warm and dry. Neurological:      Mental Status: He is alert and oriented to person, place, and time. Psychiatric:         Behavior: Behavior normal.      Comments: Patient is mildly anxious secondary to concern with regard to his symptoms and COVID-19 diagnosis.          Diagnostic Results       RADIOLOGY:  No orders to display       LABS:   Results for orders placed or performed during the hospital encounter of 04/79/86   Basic Metabolic Panel w/ Reflex to MG   Result Value Ref Range    Sodium 129 (L) 136 - 145 mmol/L    Potassium reflex Magnesium 4.2 3.5 - 5.1 mmol/L    Chloride 94 (L) 99 - 110 mmol/L    CO2 20 (L) 21 - 32 mmol/L    Anion Gap 15 3 - 16    Glucose 213 (H) 70 - 99 mg/dL    BUN 14 7 - 20 mg/dL    CREATININE 0.8 (L) 0.9 - 1.3 mg/dL    GFR Non-African American >60 >60    GFR African American >60 >60    Calcium 8.4 8.3 - 10.6 mg/dL   CBC Auto Differential   Result Value Ref Range    WBC 3.0 (L) 4.0 - 11.0 K/uL    RBC 4.74 4.20 - 5.90 M/uL    Hemoglobin 14.5 13.5 - 17.5 g/dL    Hematocrit 41.8 40.5 - 52.5 %    MCV 88.1 80.0 - 100.0 fL    MCH 30.6 26.0 - 34.0 pg    MCHC 34.7 31.0 - 36.0 g/dL    RDW 12.5 12.4 - 15.4 %    Platelets 768 (L) 326 - 450 K/uL    MPV 7.8 5.0 - 10.5 fL    Neutrophils % 68.4 %    Lymphocytes % 22.4 %    Monocytes % 8.9 %    Eosinophils % 0.0 %    Basophils % 0.3 %    Neutrophils Absolute 2.0 1.7 - 7.7 K/uL    Lymphocytes Absolute 0.7 (L) 1.0 - 5.1 K/uL    Monocytes Absolute 0.3 0.0 - 1.3 K/uL    Eosinophils Absolute 0.0 0.0 - 0.6 K/uL    Basophils Absolute 0.0 0.0 - 0.2 K/uL       ED BEDSIDE ULTRASOUND:      RECENT VITALS:  BP: 124/71, Temp: 99.3 °F (37.4 °C), Pulse: 74, Resp: 16, SpO2: 95 %     Procedures         ED Course     Nursing Notes, Past Medical Hx,Past Surgical Hx, Social Hx, Allergies, and Family Hx were reviewed. The patient was given the following medications:  Orders Placed This Encounter   Medications    0.9 % sodium chloride bolus    ondansetron (ZOFRAN) injection 4 mg    acetaminophen (TYLENOL) tablet 650 mg       CONSULTS:  None    MEDICAL DECISION MAKING / ASSESSMENT / Johnny Gela is a 63 y.o. male with a history of diabetes and a diagnosis of COVID-19 five days ago who has been here in the emergency department 5 out of the last 6 days with various concerns and complaints regarding his diagnosis and symptoms. He was most recently seen here yesterday with a work-up including stable leukopenia and lymphopenia, normal lactate and VBG. He did exhibit hyponatremia corrected to 130 which was improved from prior. Chest x-ray consistent with COVID-19.   He was able to ambulate without desaturation and was eventually discharged home with home health aide, a home pulse oximeter and consultation with PCP who agreed with this plan. He returns today reporting that he was told to come here by his home health aide nurse because his home pulse oximeter showed O2 saturation of 88 to 89%. He also reports that his shortness of breath has become mildly worse since yesterday and is exacerbated when he walks around. Walking makes him feel as if he has to sit down. He also reports shaking in his hands it due to perceived weakness. He also reports increased nausea and nonbloody diarrhea. He otherwise continues to endorse symptoms he has had prior including generalized weakness, fatigue, scratchy throat, headaches, subjective fevers, nausea. He denies chest pain, vomiting, urinary symptoms. He is alert and oriented x4. O2 saturation on room air here is 92%. While I am in the room with the patient it is between 100% and 95% depending on whether the patient is speaking or not. He is not on oxygen. On exam lung sounds are clear to auscultation bilaterally. He was administered normal saline here in the emergency department. BMP shows sodium 129 which is slightly improved from his last 2 visits; CBC is unremarkable. On reassessment the patient did asked me to look at his throat and mentioned that it had been erythematous and painful with swallowing. I did look at it and noted that he does have moderate erythema without exudates. There is no swelling. Uvula is midline. Patient is moving secretions without difficulty. On ambulation test here in the emergency department the patient's O2 saturation did not decrease and stayed at 95% while ambulating. I spoke with the patient regarding return precautions with regard to evolution of his symptoms and he was understanding of this. He was amenable to being discharged with these return precautions in place and monitoring of his home O2 saturation at home.   We also spoke about symptomatic management with regard to his throat and body aches.  He was understanding of these measures and was subsequently discharged in stable condition with the above plan in place. There is low suspicion for pulmonary embolism as the patient is not tachycardic, is not hypoxic and has symptoms that are explained by his recent COVID-19 diagnosis. This patient was also evaluated by the attending physician. All care plans were discussed and agreed upon. Clinical Impression     1. COVID-19        Disposition     PATIENT REFERRED TO:  No follow-up provider specified.     DISCHARGE MEDICATIONS:  New Prescriptions    No medications on file       [address] [facility]  10/13/21 2641

## 2021-10-13 NOTE — TELEPHONE ENCOUNTER
Patient called to speak to [facility]. He has covid and has been to ER, but they did not admit him. He is worried because he gets winded at home unless he is sitting still and he feels very weak. Patient advised [facility] out today and tomorrow. He is hoping someone can get him admitted to hospital due to his condition.

## 2021-10-13 NOTE — TELEPHONE ENCOUNTER
He was seen in ER yesterday. They could not find any reason for admitting him--as per ER physician. Make sure he eats protein or lean meats each meal or eggs for breakfast.Make sure 3 meals a day. Clindamycin antibiotic--he just finished. Keep walking around in room  all day. Make sure he is eating yogurt or probiotics everyday. He was not dehydrated yesterday. He needs to avoid sweets -as sugar still elevated and take diabetes meds. Keep o2 check --if <90 %--must go to ER for admission.

## 2021-10-13 NOTE — ED PROVIDER NOTES
ED Attending Attestation Note     Date of evaluation: 10/13/2021    This patient was seen by the advanced practice provider. I have seen and examined the patient, agree with the workup, evaluation, management and diagnosis. The care plan has been discussed. My assessment reveals a gentleman with known COVID-19 diagnosis on day of symptom 7 or 8 with several recent ED presentations. He has been found to have some mild hyponatremia but no hypoxia previously. Here, he is 92% on bedside O2 sat after walking in from the triage area. There is no increased work of breathing or stridor or use of accessory muscle. His lungs are clear to auscultation bilaterally. His neurologic exam is nonfocal and reassuring with intact motor strength bilaterally in the upper and lower extremities with no pronator drift or deficit of flexion.   We will recheck some of his laboratory studies and plan for a robust walking ambulatory oxygenation saturation test.     Josiane Hines MD  10/13/21 [id number]

## 2021-10-13 NOTE — TELEPHONE ENCOUNTER
PT Pulse oxygen level  89-90%, Diarrhea nausea,can'teat, very tired and weak. coughing with yellow greenish mucus.   Has viral Pneumonia as well      Want to know if the dr feels like he should be admitted , he lives alone and is very nervous       Please advise

## 2021-10-13 NOTE — CARE COORDINATION
Patient contacted regarding COVID-19 diagnosis and pulse oximeter ordered at discharge. Discussed COVID-19 related testing which was available at this time. Test results were positive. Patient informed of results, if available? Yes    Ambulatory Care Manager contacted the patient by telephone to perform follow-up assessment. Verified name and  with patient as identifiers. Patient has following risk factors of: heart failure, diabetes and obesity, IESHA. Symptoms reviewed with patient who verbalized the following symptoms: fatigue, pain or aching joints, shortness of breath, nausea, diarrhea and dizziness/lightheadedness. Due to worsening symptoms encounter was routed to provider for escalation. Educated patient about risk for severe COVID-19 due to risk factors according to CDC guidelines. ACM reviewed discharge instructions, medical action plan and red flag symptoms with the patient who verbalized understanding. Discussed COVID vaccination status: Yes. Education provided on COVID-19 vaccination as appropriate. Discussed exposure protocols and quarantine with CDC Guidelines. Patient was given an opportunity to verbalize any questions and concerns and agrees to contact ACM or health care provider for questions related to their healthcare. Was patient discharged with a pulse oximeter? Yes Discussed and confirmed pulse oximeter discharge instructions and when to notify provider or seek emergency care. ACM provided contact information. Plan for follow-up call in 5-7 days based on severity of symptoms and risk factors. Walked patient through putting pulse ox together and application  Reports Spo2 on RA sitting is 90%  Reports SpO2 w ambulation is 90%about 25  Feet- feels very winded  Productive cough yellowish- greenish  SOB w walking/ rest  Very tired, fatigued    Call to pt PCP office due to reported worsening sx, onset of diarrhea, reports feeling worse than yesterday.

## 2021-10-13 NOTE — TELEPHONE ENCOUNTER
Patient has recently been diagnosed with covid and has been at [facility]. three times . He feels terrible , sicker than he has ever been . He has a productive cough, dizziness, diarrhea, weakness, and he is shaking . He thinks he needs to be admitted because he lives alone and he just keeps getting worse . Patient asking if [facility] would admit him but advised [facility] is oot . Patient would like one of the other cardiologist to review his chart and admit him . His pcp recently passed away so he has no one else to call .

## 2021-10-14 ENCOUNTER — CARE COORDINATION (OUTPATIENT)
Dept: CARE COORDINATION | Age: 61
End: 2021-10-14

## 2021-10-14 DIAGNOSIS — E11.9 TYPE 2 DIABETES MELLITUS WITHOUT COMPLICATION, WITHOUT LONG-TERM CURRENT USE OF INSULIN (HCC): ICD-10-CM

## 2021-10-14 NOTE — TELEPHONE ENCOUNTER
Dr. Josef Schmitt I spoke with pharmacy and they would like a new script with updated directions for Metformin. Left message on machine for patient to call with updated condition after hospital visit.

## 2021-10-14 NOTE — CARE COORDINATION
Call to follow up from ER visit yesterday due to pt reporting feeling worse yesterday and SpO2 sats of 89-90% on RA per home pulse ox. LM on VM, mychart message sent as well       Sats at ER were 92% and per notes pt was ambulated around ER and SpO2 sats  did not drop. Noted change in Metformin dosing per pcp/ pharm communication. As well pcp advises, as follows: He was seen in ER yesterday. They could not find any reason for admitting him--as per ER physician. Make sure he eats protein or lean meats each meal or eggs for breakfast.Make sure 3 meals a day. Clindamycin antibiotic--he just finished. Keep walking around in room  all day. Make sure he is eating yogurt or probiotics everyday. He was not dehydrated yesterday. He needs to avoid sweets -as sugar still elevated and take diabetes meds.   Keep o2 check --if <90 %--must go to ER for admission.

## 2021-10-15 ENCOUNTER — CARE COORDINATION (OUTPATIENT)
Dept: CARE COORDINATION | Age: 61
End: 2021-10-15

## 2021-10-15 NOTE — CARE COORDINATION
Discussed COVID-19 related testing which was available at this time. Test results were positive. Patient informed of results, if available?  Yes    Patient/family has been provided the following resources and education related to COVID-19:                         Signs, symptoms and red flags related to COVID-19            CDC exposure and quarantine guidelines            [employer] exposure contact - [phone]            Contact for their local Department of Health                 Patient currently reports that the following symptoms have improved:  fatigue, dizziness/lightheadedness and no new/worsening symptoms     Feeling a little better   reviewed insytructions from Dr Michelle Olson   pt lives alone  Has not been able to get prescription or get to grocery    Encouraged to use delivered meals option  And graciela guardado re delivery of medications   he reports he has friends but they all work but will ask them for help      Reviewed sx mgt

## 2021-10-19 ASSESSMENT — ENCOUNTER SYMPTOMS
RHINORRHEA: 0
COLOR CHANGE: 0
EYE PAIN: 0
NAUSEA: 0
SORE THROAT: 0
VOMITING: 0
ABDOMINAL PAIN: 0
CHEST TIGHTNESS: 0
ABDOMINAL DISTENTION: 0
DIARRHEA: 0
TROUBLE SWALLOWING: 0
SHORTNESS OF BREATH: 0
WHEEZING: 0
COUGH: 0

## 2021-10-19 NOTE — ED PROVIDER NOTES
[address] ENCOUNTER          PHYSICIAN ASSISTANT NOTE       Date of evaluation: 10/7/2021    Chief Complaint     Fatigue and Generalized Body Aches      History of Present Illness     Tessa Garcia is a 63 y.o. male with a past medical history as noted below who presents to the Emergency Department with a complaint of fatigue and generalized body aches. The patient presents emergency department with complaint of feeling generally tired, worn down with all over generalized body aches. The patient reports that he received the first dose of the Moderna vaccine on Monday and since that time, has been experiencing fatigue and body aches. The patient expressed some confusion, because he thought he would not experience symptoms with the first dose of the vaccine, only the second dose. He did note that he was exposed to an individual over the weekend who has since tested positive for Covid. He reports that this was one of the reasons prompting him to get the vaccine. Denies any rhinorrhea, congestion, postnasal drip, nausea, vomiting, abdominal pain, chest pain, difficulty breathing, fevers, chills, sweats or other constitutional symptoms. He does note some polyuria and polydipsia recently. The patient has a history significant for hypertension, cardiac myopathy, complete heart block with implantable ICD and congestive heart failure. In addition, the patient reports that he has a boil on his left inner thigh which has been increasing worsening over the past couple of days. The area of redness with a small michele surrounded by firm skin. Reports similar boils/abscesses in the past though not in the groin region. Review of Systems     Review of Systems   Constitutional: Positive for fatigue. Negative for chills, diaphoresis and fever. HENT: Negative for congestion, postnasal drip, rhinorrhea, sore throat and trouble swallowing.     Eyes: Negative for pain and visual disturbance. Respiratory: Negative for cough, chest tightness, shortness of breath and wheezing. Cardiovascular: Negative for chest pain, palpitations and leg swelling. Gastrointestinal: Negative for abdominal distention, abdominal pain, diarrhea, nausea and vomiting. Endocrine: Positive for polydipsia and polyuria. Genitourinary: Negative for decreased urine volume, dysuria, flank pain, hematuria and urgency. Musculoskeletal: Positive for myalgias. Negative for neck pain and neck stiffness. Skin: Positive for wound. Negative for color change, pallor and rash. Neurological: Negative for dizziness, weakness, light-headedness and headaches. Hematological: Does not bruise/bleed easily. Psychiatric/Behavioral: Negative for confusion, hallucinations, self-injury and suicidal ideas. All other systems reviewed and are negative. Past Medical, Surgical, Family, and Social History     He has a past medical history of Acid reflux, Bradycardia, Cardiomyopathy ([facility] [address] CHB (complete heart block) ([facility] [address] CHF (congestive heart failure) ([facility] [address] Hypertension, ICD (implantable cardioverter-defibrillator) battery depletion, and Type 2 diabetes mellitus without complication, without long-term current use of insulin ([facility] [address] He has a past surgical history that includes Cardiac pacemaker placement; pacemaker placement; Cosmetic surgery; and Tonsillectomy. His family history includes Arrhythmia in his mother; Arthritis in his mother; Diabetes in his father. He reports that he has never smoked. He has never used smokeless tobacco. He reports that he does not drink alcohol and does not use drugs.     Medications     Discharge Medication List as of 10/8/2021  1:36 AM      CONTINUE these medications which have NOT CHANGED    Details   metoprolol succinate (TOPROL XL) 100 MG extended release tablet Take 1 tablet by mouth daily, Disp-90 tablet, R-3patient needs a follow up appt for future med refillNormal      famotidine (PEPCID) 20 MG tablet Take 20 mg by mouth nightly as needed. Allergies     He is allergic to doxycycline, lisinopril, losartan, septra [sulfamethoxazole-trimethoprim], sulfa antibiotics, and tetracyclines & related. Physical Exam     INITIAL VITALS: BP: (!) 146/96, Temp: 99.9 °F (37.7 °C), Pulse: 78, Resp: 19, SpO2: 99 %  Physical Exam  Constitutional:       General: He is not in acute distress. Appearance: He is well-developed. He is not diaphoretic. HENT:      Head: Normocephalic and atraumatic. Eyes:      Pupils: Pupils are equal, round, and reactive to light. Neck:      Vascular: No JVD. Cardiovascular:      Rate and Rhythm: Normal rate and regular rhythm. Pulmonary:      Effort: Pulmonary effort is normal. No respiratory distress. Breath sounds: Normal breath sounds. No stridor. No wheezing or rales. Chest:      Chest wall: No tenderness. Abdominal:      General: There is no distension. Palpations: Abdomen is soft. Tenderness: There is no abdominal tenderness. Musculoskeletal:         General: No tenderness. Normal range of motion. Cervical back: Normal range of motion and neck supple. Skin:     General: Skin is warm and dry. Coloration: Skin is not pale. Findings: Abscess present. No erythema or rash. Neurological:      Mental Status: He is alert and oriented to person, place, and time. Coordination: Coordination normal.         Diagnostic Results     RADIOLOGY:  XR CHEST 1 VIEW   Final Result      No evidence of acute cardiopulmonary disease.                 LABS:   Results for orders placed or performed during the hospital encounter of 10/07/21   Culture, Wound    Specimen: Abscess   Result Value Ref Range    WOUND/ABSCESS (A)      Normal skin osmar,  Light growth  No further workup      Gram Stain Result (A)      1+ WBC's (Polymorphonuclear)  2+ Gram positive cocci      Organism BHS Group B (Strep agalacticae) (A) WOUND/ABSCESS       Heavy growth  Susceptibility testing of penicillin and other beta lactams is  not necessary for beta hemolytic Streptococci since resistant  strains have not been identified.  (CLSI M100)     CBC auto differential   Result Value Ref Range    WBC 3.2 (L) 4.0 - 11.0 K/uL    RBC 4.89 4.20 - 5.90 M/uL    Hemoglobin 15.2 13.5 - 17.5 g/dL    Hematocrit 43.5 40.5 - 52.5 %    MCV 89.1 80.0 - 100.0 fL    MCH 31.1 26.0 - 34.0 pg    MCHC 35.0 31.0 - 36.0 g/dL    RDW 12.7 12.4 - 15.4 %    Platelets 78 (L) 489 - 450 K/uL    MPV 7.8 5.0 - 10.5 fL    PLATELET SLIDE REVIEW Decreased     Neutrophils % 71.0 %    Lymphocytes % 16.0 %    Monocytes % 12.0 %    Eosinophils % 0.0 %    Basophils % 0.0 %    Neutrophils Absolute 2.3 1.7 - 7.7 K/uL    Lymphocytes Absolute 0.5 (L) 1.0 - 5.1 K/uL    Monocytes Absolute 0.4 0.0 - 1.3 K/uL    Eosinophils Absolute 0.0 0.0 - 0.6 K/uL    Basophils Absolute 0.0 0.0 - 0.2 K/uL    Bands Relative 1 0 - 7 %   Basic Metabolic Panel w/ Reflex to MG   Result Value Ref Range    Sodium 131 (L) 136 - 145 mmol/L    Potassium reflex Magnesium 4.2 3.5 - 5.1 mmol/L    Chloride 98 (L) 99 - 110 mmol/L    CO2 18 (L) 21 - 32 mmol/L    Anion Gap 15 3 - 16    Glucose 288 (H) 70 - 99 mg/dL    BUN 14 7 - 20 mg/dL    CREATININE 0.8 (L) 0.9 - 1.3 mg/dL    GFR Non-African American >60 >60    GFR African American >60 >60    Calcium 8.6 8.3 - 10.6 mg/dL   Urinalysis, reflex to microscopic (Lab)   Result Value Ref Range    Color, UA Yellow Straw/Yellow    Clarity, UA Clear Clear    Glucose, Ur >=1000 (A) Negative mg/dL    Bilirubin Urine Negative Negative    Ketones, Urine 40 (A) Negative mg/dL    Specific Gravity, UA >=1.030 1.005 - 1.030    Blood, Urine Negative Negative    pH, UA 5.5 5.0 - 8.0    Protein, UA TRACE (A) Negative mg/dL    Urobilinogen, Urine 0.2 <2.0 E.U./dL    Nitrite, Urine Negative Negative    Leukocyte Esterase, Urine Negative Negative    Microscopic Examination YES     Urine Type Voided Microscopic Urinalysis   Result Value Ref Range    Mucus, UA Rare (A) None Seen /LPF    WBC, UA 0-2 0 - 5 /HPF    RBC, UA 0-2 0 - 4 /HPF   Hemoglobin A1c   Result Value Ref Range    Hemoglobin A1C 11.8 See comment %    eAG 292.0 mg/dL   COVID-19   Result Value Ref Range    SARS-CoV-2 Detected (A) Not detected   POCT Glucose   Result Value Ref Range    POC Glucose 256 (H) 70 - 99 mg/dl    Performed on ACCU-CHEK        ED BEDSIDE ULTRASOUND:  N/A    RECENT VITALS:  BP: 120/67, Temp: 98.6 °F (37 °C), Pulse: 78, Resp: 19, SpO2: 96 %     Procedures     Incision and Drainage Procedure Note    Indication: Abscess of the left inner thigh    Procedure: The goals and steps of the procedure were explained to the patient who verbalized understanding and verbal consent to continue was obtained. The patient was positioned appropriately and the skin over the incision site was prepped with aseptic technique to achieve a sterile field. Local anesthesia was obtained by field block infiltration using approximately 5 mL's 1% of Lidocaine with epinephrine. A #11 blade was used to make a 1 cm incision in the area of greatest fluctuance. Approximately 10 mls of purulent fluid was expressed and was sent for culture. A curved hemostat was used to explore the cavity and break up any loculations. The drainage cavity was then irrigated and dressed with a sterile dressing and a bandage. Complications: None    This procedure was performed by the physician assistant student under my direct supervision    ED Course     Nursing Notes, Past Medical Hx,Past Surgical Hx, Social Hx, Allergies, and Family Hx were reviewed.     The patient was given the following medications:  Orders Placed This Encounter   Medications    lactated ringers infusion 1,000 mL    ketorolac (TORADOL) injection 15 mg    DISCONTD: ondansetron (ZOFRAN) injection 4 mg    lidocaine-EPINEPHrine 1 %-1:[id number] injection 20 mL    clindamycin (CLEOCIN) capsule 300 mg Order Specific Question:   Antimicrobial Indications     Answer:   Skin and Soft Tissue Infection    clindamycin (CLEOCIN) 300 MG capsule     Sig: Take 1 capsule by mouth 4 times daily for 7 days     Dispense:  28 capsule     Refill:  0    ondansetron (ZOFRAN ODT) 4 MG disintegrating tablet     Sig: Take 1 tablet by mouth every 8 hours as needed for Nausea     Dispense:  20 tablet     Refill:  0    meloxicam (MOBIC) 15 MG tablet     Sig: Take 0.5 tablets by mouth 2 times daily for 10 days     Dispense:  10 tablet     Refill:  0    acetaminophen (TYLENOL) tablet 650 mg    ondansetron (ZOFRAN-ODT) disintegrating tablet 4 mg       CONSULTS:  None    MEDICAL DECISION MAKING / ASSESSMENT / Archana Workman is admitted to the Emergency Department for evaluation of his chief complaint as described in the history of present illness. Complete history and physical was performed by me and my attending. Nursing notes, past medical history, surgical history, family history and social history were reviewed and addressed in the HPI. Guido Diego is a 63 y.o. male who presents to the emergency department with a complaint of malaise, myalgias, polyuria, polydipsia and an abscess of the left inner thigh. Patient reports that he received the Moderna COVID-19 vaccine on Monday of this week and has had progressively worsening malaise myalgias. Denies any chest pain, cough or difficulty breathing. No fevers or chills at home. Reports decreased activity, decreased energy and decreased appetite. Has noted some increase in urination and thirst.  Reports he is surprised that he has had symptoms for so long after the first dose of the vaccine, as most people that he had talked to reported symptoms after the second dose only. He did, however, report that he was exposed to an individual this past weekend who tested positive for COVID-19.   I discussed with the patient that he could possibly be experiencing symptoms from a COVID-19 infection that occurred despite him getting the vaccine and the potential that the vaccine could have altered, changed or increased his antibody response, and the potential symptoms associated with same. The patient is hemodynamically stable and within normal limits on presentation to the emergency department. He demonstrates no tachycardia or tachypnea. He is not hypoxic and does not generate any hypoxia with activity. He also notes having polyuria and polydipsia for the past couple of weeks. The patient denies a history of diabetes, but reports he has a history remotely of hypoglycemia. Is not currently on any medications for this. He also has an area of erythema and swelling of the medial aspect of the left thigh just beyond the inguinal fold with physical exam findings consistent for abscess. Incision and drainage performed as noted above. Given the patient's comorbidities, as well as his potential exposure to COVID-19, I did perform a baseline work-up for the patient. His white blood cell count is slightly low at 3.2 with noted lymphopenia. His platelet count is slightly low at 78 without platelet clotting noted. No evidence of anemia. His BMP demonstrates slight hyponatremia and hypochloremia consistent with dehydration. His bicarbonate level is slightly decreased, but he does have a normal anion gap of 15 and his glucose is elevated to 288. The patient was given a bolus of IV fluids in the emergency department to help alleviate what could be mild metabolic acidosis as result of dehydration, or hypoglycemia no signs or symptoms concerning for diabetic ketoacidosis. With a glucose elevated to this level, and without a reported history of diabetes, I will send a hemoglobin A1c to be followed up by primary care.   This will not result in real-time in the emergency department, but feel this level is higher than what would be expected with just acute phase reaction in the acute stage of infection. On urinalysis she does demonstrate significant glucosuria as well as mild ketonuria concerning for diabetes as opposed to starvation ketosis. No evidence of urinary tract infection. A wound culture was sent after incision and drainage, given the concern for untreated or newly diagnosed diabetes. We will start the patient on clindamycin, given his antibiotic allergies and this will be adjusted with culture and sensitivity as needed. We strongly recommend that the patient follow-up with the [facility]. outpatient clinic, for following up symptoms that could be consistent with COVID-19. A COVID-19 test is pending, in addition to the likelihood the patient may have diabetes. The patient states that he can follow-up with the clinic first thing tomorrow. He does not want to be admitted to the hospital at this time, and without signs or symptoms concerning for DKA or severe respiratory infection, I do not think the patient needs to be admitted at this time. His presenting symptoms and associated physical exam findings are most consistent with a non-complicated upper respiratory tract infection for which my clinical index of suspicion for a life-threatening serious bacterial or viral illness is very low at this time. There are no clinical manifestations of impending respiratory distress or severe sepsis. He is therefore expected to do well with conservative management, hydration and traditional supportive care. I discussed this plan at length the patient who verbalizes understanding and is in agreement. I discussed strict return precautions for the development of worsening symptoms for about COVID-19 as well as cellulitis/abscess. I also provided the patient with an oral rehydration schedule. He was given instructions for isolation at home until his COVID-19 test from today can result. The patient is currently stable and will be discharged home for continued self-care.   Please see patient's AVS for additional discharge instructions. The patient was seen and evaluated by myself and the physician assistant student, as well as the attending physician, Brooks Mims MD, who agrees with my assessment, treatment and plan. Clinical Impression     1. Viral syndrome    2. Person under investigation for COVID-19    3. Dehydration    4. Hyperglycemia    5.  Abscess of left leg        Disposition     PATIENT REFERRED TO:  [facility]  [address]   [phone]    Schedule an appointment as soon as possible for a visit       [facility] Emergency Department  [address] [place] [address]  [phone]  Go to   If symptoms worsen      DISCHARGE MEDICATIONS:  Discharge Medication List as of 10/8/2021  1:36 AM      START taking these medications    Details   clindamycin (CLEOCIN) 300 MG capsule Take 1 capsule by mouth 4 times daily for 7 days, Disp-28 capsule, R-0Print      ondansetron (ZOFRAN ODT) 4 MG disintegrating tablet Take 1 tablet by mouth every 8 hours as needed for Nausea, Disp-20 tablet, R-0Print      meloxicam (MOBIC) 15 MG tablet Take 0.5 tablets by mouth 2 times daily for 10 days, Disp-10 tablet, R-0Print             DISPOSITION Decision To Discharge 10/08/2021 03:54:29 AM                                                                                                           LEANDER Lin  10/19/21 [id number]

## 2021-10-22 ENCOUNTER — CARE COORDINATION (OUTPATIENT)
Dept: CARE COORDINATION | Age: 61
End: 2021-10-22

## 2021-10-28 ENCOUNTER — TELEPHONE (OUTPATIENT)
Dept: CARDIOLOGY CLINIC | Age: 61
End: 2021-10-28

## 2021-10-28 NOTE — TELEPHONE ENCOUNTER
Patient called office back and message was relayed with verbal understanding and encouraged to call office with any other questions.

## 2021-10-28 NOTE — TELEPHONE ENCOUNTER
Pt calling he is wayne for 2nd COVID Vaccine on 11/01 and is wondering if it's safe for him to get it? Pt states after his first vaccine a month ago,  he got very ill WITH COVID and is concerned about this second dose.  Pls call to advise Thank you

## 2021-10-28 NOTE — TELEPHONE ENCOUNTER
Yes, I would recommend getting the second dose, even if he got covid after first vaccine. My nurse, Elizabeth Julien. Had the same issue and she did very well. And is now super protected. It is likely that his covid course was less severe as a result of his first vaccine.      JOSTIN

## 2021-11-10 ENCOUNTER — VIRTUAL VISIT (OUTPATIENT)
Dept: INTERNAL MEDICINE CLINIC | Age: 61
End: 2021-11-10
Payer: COMMERCIAL

## 2021-11-10 ENCOUNTER — TELEPHONE (OUTPATIENT)
Dept: INTERNAL MEDICINE CLINIC | Age: 61
End: 2021-11-10

## 2021-11-10 DIAGNOSIS — R53.83 OTHER FATIGUE: ICD-10-CM

## 2021-11-10 DIAGNOSIS — I42.9 CARDIOMYOPATHY, UNSPECIFIED TYPE (HCC): Chronic | ICD-10-CM

## 2021-11-10 DIAGNOSIS — E11.9 TYPE 2 DIABETES MELLITUS WITHOUT COMPLICATION, WITHOUT LONG-TERM CURRENT USE OF INSULIN (HCC): Primary | ICD-10-CM

## 2021-11-10 DIAGNOSIS — E11.9 TYPE 2 DIABETES MELLITUS WITHOUT COMPLICATION, WITHOUT LONG-TERM CURRENT USE OF INSULIN (HCC): ICD-10-CM

## 2021-11-10 PROCEDURE — 99213 OFFICE O/P EST LOW 20 MIN: CPT | Performed by: INTERNAL MEDICINE

## 2021-11-10 RX ORDER — PIOGLITAZONEHYDROCHLORIDE 30 MG/1
30 TABLET ORAL DAILY
Qty: 30 TABLET | Refills: 0 | Status: SHIPPED | OUTPATIENT
Start: 2021-11-10 | End: 2021-12-23 | Stop reason: SDUPTHER

## 2021-11-10 RX ORDER — PIOGLITAZONEHYDROCHLORIDE 30 MG/1
30 TABLET ORAL DAILY
Qty: 30 TABLET | Refills: 0 | Status: SHIPPED | OUTPATIENT
Start: 2021-11-10 | End: 2021-11-10 | Stop reason: SDUPTHER

## 2021-11-10 RX ORDER — CALCIUM CITRATE/VITAMIN D3 200MG-6.25
1 TABLET ORAL 2 TIMES DAILY
Qty: 50 EACH | Refills: 5 | Status: SHIPPED | OUTPATIENT
Start: 2021-11-10 | End: 2022-02-08 | Stop reason: SDUPTHER

## 2021-11-10 ASSESSMENT — ENCOUNTER SYMPTOMS
RHINORRHEA: 0
WHEEZING: 0
SINUS PAIN: 0
SINUS PRESSURE: 0
EYE DISCHARGE: 0
ABDOMINAL PAIN: 0
BLOOD IN STOOL: 0
SHORTNESS OF BREATH: 0
CHEST TIGHTNESS: 0
NAUSEA: 0
VOMITING: 0
EYE PAIN: 0
SORE THROAT: 0
TROUBLE SWALLOWING: 0
COUGH: 0

## 2021-11-10 NOTE — PROGRESS NOTES
Tamia Miner (:  1960) is a 63 y.o. male,Established patient, here for evaluation of the following chief complaint(s): Post-COVID Symptoms (was in ER for Covid, pt states still little SOB and very tired. )         ASSESSMENT/PLAN:  1. Type 2 diabetes mellitus without complication, without long-term current use of insulin (HCC)  -     blood glucose test strips (TRUE METRIX BLOOD GLUCOSE TEST) strip; 1 each by In Vitro route 2 times daily As needed. , Disp-50 each, R-5Normal  -     pioglitazone (ACTOS) 30 MG tablet; Take 1 tablet by mouth daily, Disp-30 tablet, R-0Normal  2. Other fatigue  3. Cardiomyopathy, unspecified type ([address]      Return in about 4 weeks (around 2021) for diabetes mellitus. SUBJECTIVE/OBJECTIVE:  Er 10/13      COVID-19 infection in October 12 the 13th and he had cough with phlegm and he was getting better and that is when he talked with cardiologist and they okayed COVID-19 vaccination November first and since then his sugar went up and now coming down but otherwise he did have some systemic symptoms and those all are going away and the breathing is getting better and tiredness is getting better and does not have any flu or body ache now which he had for couple days after the vaccination. He is not coughing up anything. No fever chills or shortness of breath and oxygen saturation is running 95%. He is feeling better he does not want to do any testing right now he said he will keep recovering and then will see us in few weeks. Diabetes Mellitus     Home blood glucose log reviewed. Sugar increased w covid 19 vaccine      meds   180 this am    He  has no symptoms of hypoglycemia. The patient denied polyphagia, polydipsia, or polyuria. The available labs reviewed and analyzed and independent interpretation of the results explained at length.   The last HBA1C and routine lab was Lab Results       Component                Value               Date LABA1C                   11.8                10/07/2021            Lab Results       Component                Value               Date                       EAG                      292.0               10/07/2021            Lab Results       Component                Value               Date                       NA                       129 (L)             10/13/2021                 K                        4.2                 10/13/2021                 CL                       94 (L)              10/13/2021                 CREATININE               0.8 (L)             10/13/2021                 BUN                      14                  10/13/2021                 CO2                      20 (L)              10/13/2021                 LABMICR                  YES                 10/09/2021              Has been taking meds as ordered. He has no side effects from the current diabetes medications. Hypertension    Watching low-sodium diet. Taking blood pressure medications regularly. Blood pressure checked off and on and trying to keep a goal of blood pressure less than 130/85 most of the time. Denies any chest pain / palpitation / shortness of breath / lightheadedness etc.   The available labs reviewed and analyzed and independent interpretation of the results explained at length.   Lab Results       Component                Value               Date                       NA                       129                 10/13/2021                 K                        4.2                 10/13/2021                 CL                       94                  10/13/2021                 CO2                      20                  10/13/2021                 BUN                      14                  10/13/2021                 CREATININE               0.8                 10/13/2021                 GLUCOSE                  213                 10/13/2021                 CALCIUM                  8.4 10/13/2021                Congestive Heart Failure    Patient following up with history of congestive heart failure. Watching low-sodium diet. Blood pressure watch --mostly staying less than 130/85. Denies shortness of breath. Denies any side effects of medications. Review of Systems   Constitutional: Positive for fatigue (  Improving). Negative for appetite change, chills, fever and unexpected weight change. HENT: Negative for congestion, ear discharge, ear pain, nosebleeds, rhinorrhea, sinus pressure, sinus pain, sore throat and trouble swallowing. Eyes: Negative for pain and discharge. Respiratory: Negative for cough, chest tightness, shortness of breath and wheezing. Cardiovascular: Negative for chest pain, palpitations and leg swelling. Gastrointestinal: Negative for abdominal pain, blood in stool, nausea and vomiting. Endocrine: Negative for polydipsia and polyphagia. Genitourinary: Negative for difficulty urinating, enuresis, flank pain and hematuria. Musculoskeletal: Negative for myalgias. Skin: Negative for rash. Neurological: Negative for facial asymmetry, weakness, light-headedness, numbness and headaches. Psychiatric/Behavioral: Negative for confusion.        Patient-Reported Vitals 10/8/2021   Patient-Reported Temperature 98.8        Physical Exam    [INSTRUCTIONS:  \"[x]\" Indicates a positive item  \"[]\" Indicates a negative item  -- DELETE ALL ITEMS NOT EXAMINED]    Constitutional: [x] Appears well-developed and well-nourished [x] No apparent distress      [] Abnormal -     Mental status: [x] Alert and awake  [x] Oriented to person/place/time [x] Able to follow commands    [] Abnormal -     Eyes:   EOM    [x]  Normal    [] Abnormal -   Sclera  [x]  Normal    [] Abnormal -          Discharge [x]  None visible   [] Abnormal -     HENT: [x] Normocephalic, atraumatic  [] Abnormal -   [] Mouth/Throat: Mucous membranes are moist    External Ears [x] Normal  [] Abnormal -    Neck: [x] No visualized mass [] Abnormal -     Pulmonary/Chest: [x] Respiratory effort normal   [x] No visualized signs of difficulty breathing or respiratory distress        [] Abnormal -      Musculoskeletal:   [x] Normal gait with no signs of ataxia         [x] Normal range of motion of neck        [] Abnormal -     Neurological:        [x] No Facial Asymmetry (Cranial nerve 7 motor function) (limited exam due to video visit)          [x] No gaze palsy        [] Abnormal -          Skin:        [x] No significant exanthematous lesions or discoloration noted on facial skin         [] Abnormal -            Psychiatric:       [x] Normal Affect [] Abnormal -        [x] No Hallucinations    Other pertinent observable physical exam findings:-          On this date 11/10/2021 I have spent 21 minutes reviewing previous notes, test results and face to face (virtual) with the patient discussing the diagnosis and importance of compliance with the treatment plan as well as documenting on the day of the visitJojo Avendaño, was evaluated through a synchronous (real-time) audio-video encounter. The patient (or guardian if applicable) is aware that this is a billable service. Verbal consent to proceed has been obtained within the past 12 months. The visit was conducted pursuant to the emergency declaration under the [facility], [address] [place] [address] authority and the [employer] and [employer] General Act. Patient identification was verified, and a caregiver was present when appropriate. The patient was located in a state where the provider was credentialed to provide care. An electronic signature was used to authenticate this note.     --Autumn Donato MD

## 2021-11-10 NOTE — PATIENT INSTRUCTIONS
Extensive counseling done to keep diabetes in control to avoid kidney damage needing dialysis and avoid neuropathy needing toe amputations and avoid heart damage causing cardiomyopathy and congestive heart failure and avoid brain involvement including stroke and memory loss etc.    keep low carbohydrate diet. Breakfast : 4 egg white omelet or scrambled eggs with bell pepper,onion,tomato,spinach etc or boiled eggs for breakfast.     Lunch : Deck of card size meat (baked, broiled or grilled ) with leafy vegetables - spinach / kale / mustard green / lettuce etc. for salad. Supper : Boyd Cristina grilled meats -deck of cards sized with 3/4th dinner plate full of vegetables -green bean or broccoli or cauliflower or carrots. If needed , buy bread 35 to 40 kcal- two slices at a time only and Tortillas 50- 90 kcal only at one meal.    Least or no bread, potato, pasta, highly processed foods, fried foods, sweets etc.    Lose weight or keep healthy weight and avoid weight gain by SCHEDULED 3 - 4 miles Elliptical machine or brisk walk and Dumbbell 2-5 lb arm exercises- 4 group muscles -4 sets of 15-50 repetitions--4 days a week--AS TOLERATED. Keep sugar record and bring it with every visit. Keep taking diabetes medications properly and let us know if you have made any diabetes medication changes. Try to keep fasting sugar less than 120 and 2 hours after meal sugar less than 150. keep Sugar in good control to avoid diabetes complications on kidneys , heart , eyes , nerves , brain etc.  Avoid low sugar with proper meals and 100-150 kcal snack. Keep yearly eye doctor follow up to monitor your diabetes effect on your eyes. Keep cardiologist follow-up. Keep exercising to get stronger.

## 2021-12-16 ENCOUNTER — CARE COORDINATION (OUTPATIENT)
Dept: CARE COORDINATION | Age: 61
End: 2021-12-16

## 2021-12-21 DIAGNOSIS — E11.9 TYPE 2 DIABETES MELLITUS WITHOUT COMPLICATION, WITHOUT LONG-TERM CURRENT USE OF INSULIN (HCC): ICD-10-CM

## 2021-12-23 ENCOUNTER — CARE COORDINATION (OUTPATIENT)
Dept: CARE COORDINATION | Age: 61
End: 2021-12-23

## 2021-12-23 DIAGNOSIS — E11.9 TYPE 2 DIABETES MELLITUS WITHOUT COMPLICATION, WITHOUT LONG-TERM CURRENT USE OF INSULIN (HCC): ICD-10-CM

## 2021-12-23 RX ORDER — PIOGLITAZONEHYDROCHLORIDE 30 MG/1
30 TABLET ORAL DAILY
Qty: 30 TABLET | Refills: 0 | Status: SHIPPED | OUTPATIENT
Start: 2021-12-23 | End: 2022-02-08 | Stop reason: SDUPTHER

## 2021-12-23 NOTE — CARE COORDINATION
Ambulatory Care Coordination Note  12/23/2021  CM Risk Score: 6  Charlson 10 Year Mortality Risk Score: 47%     ACC: Shahzad Gage, [facility]    Hx: CHB, HTN, Cardiomyopathy, AICD, IESHA, Type 2 DM, COVID-19, CHF, Hyponatremia      Summary Note: The pt stated he recently has experienced some muscle soreness in his legs, upset stomach and vision is a little blurry. The pt stated he is a Type 2 DM and his BS's have been high for him in the 200's since he had the COVID vaccines and he also had COVID-19 in October, 2021. The pt stated he has in the past been able to control his BS's with diet and exercise. The pt stated in the past his FBS ran around 110. The pt stated he has been taking Metformin and Actos since he was seen in the ED (4 times in October) for COVID. The pt stated he tests his blood sugars daily. The pt stated he had a virtual appointment with the PCP on 11/10/21. The pt stated he was supposed to follow with his PCP in 4 weeks in the office but he has been hesitant about going into the office d/t the surge of COVID and his current symptoms. The pt stated he was waiting until January to make the follow up appointment. The pt stated he does need his Metformin and Actos refilled. The pt agreed to work with the RN, ACM. The RN, ACM instructed the pt to call for the appointment so it is scheduled and the nurse will consult with the office to have his medications refilled. The pt stated he would eventually like to be able to control his DM again with diet and exercise. The pt stated he is currently working from home and he has not been exercising. Plan:  The pt agreed to further calls from the RN, ACM  The RN, ACM will complete the pt's full assessment at the next encounter. The pt plans to work on diet and exercise. Continue on current medications. The RN, ACM will work on diet with the pt.       Care Coordination Interventions    Program Enrollment: Complex Care  Referral from Primary Care Provider: You may be receiving a Press Ganey survey in the mail. If you do, we would appreciate your honest feedback. This information comes back to the clinic so it can be used to improve patient experience and quality. Your opinion is important to us. Thank you!      [place]  Navigate local mental health and substance abuse services.  Take an anonymous mental health screening.  Search and online library of resources.    If imaging or laboratory studies were ordered at your visit today, please read the following:    Most of these laboratory test results will be back within 24 to 48 hours.    All imaging test results need to be read by a radiologist. Most results will be back within 48 to 72 hours.    Please allow 5 to 7 business days to hear from our office. Once Kyra has reviewed these results, the office will contact you either by phone, letter, or My Alice to discuss recommendations.     Please arrive 10-minutes early for all scheduled appointments. We strive to respect the time of all patients. If you arrive late, you may be asked to reschedule your appointment.    Patient: Edwin Mendieta  Clinician: HARVINDER Shields      [facility] Services Description     [x]    Cost Assessment Pharmacy will review therapeutic alternatives, $3.99 medication list, and apply coupons where appropriate     []  Automatic Refill   Pharmacy will assist patient in signing up for automatic refill      []  1:1 Patient  Appointment Pharmacy will schedule a patient appointment to review patient’s medications, provide education, and answer questions     []  Medication  Bubble  Packaging Pharmacy will presort and prepackage medications by dose, time of day and day of the week     []  Immunization Assessment* Pharmacy will assess patient for updated immunizations. Pharmacist will provide as appropriate*     []  Home Delivery Pharmacy will assist patient in signing up for home delivery service     []  Sharps container* ($3.99  No  Suggested Interventions and Community Resources  Diabetes Education: In Process  Zone Management Tools: In Process         Goals Addressed    None         Prior to Admission medications    Medication Sig Start Date End Date Taking? Authorizing Provider   metFORMIN (GLUCOPHAGE) 500 MG tablet TAKE ONE TABLET BY MOUTH THREE TIMES A DAY WITH MEALS 12/23/21   Jesse Evangelista MD   pioglitazone (ACTOS) 30 MG tablet Take 1 tablet by mouth daily 12/23/21   Catherine Holden MD   blood glucose test strips (TRUE METRIX BLOOD GLUCOSE TEST) strip 1 each by In Vitro route 2 times daily As needed. 11/10/21   Jesse Evangelista MD   Blood Glucose Monitoring Suppl (TRUE METRIX AIR GLUCOSE METER) DANGELO Check sugar twice a day 10/8/21   Jesse Evangelista MD   Lancets MISC 1 each by Does not apply route 2 times daily 10/8/21   Jesse Evangelista MD   tiZANidine (ZANAFLEX) 4 MG tablet Take 0.5 tablets by mouth 2 times daily Watch drowsiness 10/8/21   Jesse Evangelista MD   Magnesium Oxide 500 MG TABS Take 500 mg by mouth daily 10/8/21   Jesse Evangelista MD   ondansetron (ZOFRAN ODT) 4 MG disintegrating tablet Take 1 tablet by mouth every 8 hours as needed for Nausea 10/7/21   LEANDER Molina   metoprolol succinate (TOPROL XL) 100 MG extended release tablet Take 1 tablet by mouth daily 9/20/21   ZEUS Salas CNP   famotidine (PEPCID) 20 MG tablet Take 20 mg by mouth nightly as needed.     Historical Provider, MD       Future Appointments   Date Time Provider Isela Wasserman   1/10/2022  8:55 AM SCHEDULE, Maykel Sandoval [facility]   3/24/2022  2:30 PM SCHEDULE, Mark[facility]   3/24/2022  3:00 PM MD [facility]   4/11/2022  8:55 AM SCHEDULE, KENWOOD [facility]   7/11/2022  8:55 AM SCHEDULE, KENWOOD [facility]     ,   Diabetes Assessment    Meal Planning: Avoidance of concentrated sweets   How often do you test your blood sugar?: Daily initial cost) Pharmacy will provide initial sharps container for $3.99* Disposal and replacement will be free.     *Costs per insurance copay or as specified. Other services are free of charge.    [facility] pharmacy locations can be found at [email or web address]    -------------------------------------------------------------------------------------------------------------------------------------------------------------------------------------------------------     Do you have barriers with adherence to non-pharmacologic self-management interventions?  (Nutrition/Exercise/Self-Monitoring): No       No patient-reported symptoms       and   General Assessment    Do you have any symptoms that are causing concern?: Yes  Progression since Onset: Unchanged  Reported Symptoms: Nausea (Comment: muscle soreness in legs and vision a little blurry)

## 2021-12-29 ENCOUNTER — TELEPHONE (OUTPATIENT)
Dept: INTERNAL MEDICINE CLINIC | Age: 61
End: 2021-12-29

## 2021-12-29 NOTE — TELEPHONE ENCOUNTER
Pt called into the the office to see if he should schedule a virtual or an in person. He said that someone lvm on his phone to schedule a vv from a follow up but he said in his last visit with  he wanted to see him in person. Pt is also unsure of what the appointment is for. The patient said that a nurse called to request an appointment for him. Please advise and call.

## 2022-01-06 ENCOUNTER — VIRTUAL VISIT (OUTPATIENT)
Dept: INTERNAL MEDICINE CLINIC | Age: 62
End: 2022-01-06
Payer: COMMERCIAL

## 2022-01-06 DIAGNOSIS — E11.9 TYPE 2 DIABETES MELLITUS WITHOUT COMPLICATION, WITHOUT LONG-TERM CURRENT USE OF INSULIN (HCC): ICD-10-CM

## 2022-01-06 DIAGNOSIS — I10 PRIMARY HYPERTENSION: ICD-10-CM

## 2022-01-06 DIAGNOSIS — E87.1 HYPONATREMIA: ICD-10-CM

## 2022-01-06 DIAGNOSIS — M79.10 MYALGIA: Primary | ICD-10-CM

## 2022-01-06 PROCEDURE — 99214 OFFICE O/P EST MOD 30 MIN: CPT | Performed by: INTERNAL MEDICINE

## 2022-01-06 RX ORDER — EMPAGLIFLOZIN 25 MG/1
1 TABLET, FILM COATED ORAL DAILY
Qty: 30 TABLET | Refills: 0 | Status: SHIPPED | OUTPATIENT
Start: 2022-01-06 | End: 2022-02-08 | Stop reason: SDUPTHER

## 2022-01-06 ASSESSMENT — ENCOUNTER SYMPTOMS
EYE PAIN: 0
SORE THROAT: 0
COUGH: 0
SINUS PAIN: 0
CHEST TIGHTNESS: 0
BLOOD IN STOOL: 0
ABDOMINAL PAIN: 0
NAUSEA: 0
SINUS PRESSURE: 0
EYE DISCHARGE: 0
SHORTNESS OF BREATH: 0
TROUBLE SWALLOWING: 0
RHINORRHEA: 0
VISUAL CHANGE: 0
VOMITING: 0
WHEEZING: 0

## 2022-01-06 NOTE — PATIENT INSTRUCTIONS
Fasting labs Saturday. Increase metformin to 4 a day.    jardiance 25mg a day    Actos  Same. Lose wt. Regular exercise . Diabetic  Diet--keep avoiding sweets and low-carb diet and more lean meats and vegetables. Rest same meds.     64 oz water / fluids

## 2022-01-06 NOTE — PROGRESS NOTES
Nilson Weinstein (:  1960) is a 63 y.o. male,Established patient, here for evaluation of the following chief complaint(s): Muscle Pain (x 1 month upper and lower extremties )         ASSESSMENT/PLAN:  1. Myalgia  -     CBC Auto Differential; Future  -     Magnesium; Future  -     Vitamin D 25 Hydroxy; Future  -     Sedimentation Rate; Future  2. Type 2 diabetes mellitus without complication, without long-term current use of insulin (HCC)  -     Comprehensive Metabolic Panel; Future  -     Lipid Panel; Future  -     Hemoglobin A1C; Future  -     Protein / creatinine ratio, urine; Future  -     empagliflozin (JARDIANCE) 25 MG tablet; Take 1 tablet by mouth daily, Disp-30 tablet, R-0Normal  3. Hyponatremia  -     T4, Free; Future  -     TSH with Reflex; Future  4. Primary hypertension  Blood pressure medicine      Fasting labs Saturday. Increase metformin to 4 a day.    jardiance 25mg a day    Actos  Same. Lose wt. Regular exercise . Diabetic  Diet--keep avoiding sweets and low-carb diet and more lean meats and vegetables. Rest same meds. 64 oz water / fluids          Return in about 4 weeks (around 2/3/2022) for diabetes mellitus. SUBJECTIVE/OBJECTIVE:  Biceps/lower legs /stiffness hands/fingers he had taken magnesium before but he stopped taking as he was doing fine and eating better and    He does not want to come to the office is is worried that he will get Covid surge infection and explained to him that office we do not see any sick people with Covid. Pharmacy told him --actos could be causing muscle pains--few weeks ago and explained to him that is not true and he should restart his Actos. BP good-116/76    Diabetes Mellitus--bread / eggs--BF  Lunch--salad/fat free dressing  Dinner-Hamburger florin --no bun; Noodles --not sure high fiber         Home blood glucose log reviewed. 175-200    He  has no symptoms of hypoglycemia.     The patient denied polyphagia, polydipsia, or polyuria. The available labs reviewed and analyzed and independent interpretation of the results explained at length. The last HBA1C and routine lab was Lab Results       Component                Value               Date                       LABA1C                   11.8                10/07/2021            Lab Results       Component                Value               Date                       EAG                      292.0               10/07/2021            Lab Results       Component                Value               Date                       NA                       129 (L)             10/13/2021                 K                        4.2                 10/13/2021                 CL                       94 (L)              10/13/2021                 CREATININE               0.8 (L)             10/13/2021                 BUN                      14                  10/13/2021                 CO2                      20 (L)              10/13/2021                 LABMICR                  YES                 10/09/2021              Has been taking meds as ordered. He has no side effects from the current diabetes medications. Muscle Pain  This is a new problem. The current episode started 1 to 4 weeks ago. The problem occurs intermittently. The problem has been waxing and waning since onset. Pain location: multiple area. The pain is mild. Pertinent negatives include no abdominal pain, chest pain, eye pain, fever, headaches, nausea, rash, shortness of breath, visual change, vomiting, weakness or wheezing. Review of Systems   Constitutional: Negative for appetite change, chills, fever and unexpected weight change. HENT: Negative for congestion, ear discharge, ear pain, nosebleeds, rhinorrhea, sinus pressure, sinus pain, sore throat and trouble swallowing. Eyes: Negative for pain and discharge. Respiratory: Negative for cough, chest tightness, shortness of breath and wheezing. Cardiovascular: Negative for chest pain, palpitations and leg swelling. Gastrointestinal: Negative for abdominal pain, blood in stool, nausea and vomiting. Endocrine: Negative for polydipsia and polyphagia. Genitourinary: Negative for difficulty urinating, enuresis, flank pain and hematuria. Musculoskeletal: Positive for myalgias. Skin: Negative for rash. Neurological: Negative for facial asymmetry, weakness, light-headedness, numbness and headaches. Psychiatric/Behavioral: Negative for confusion. Patient-Reported Vitals 1/6/2022   Patient-Reported Weight 210lb   Patient-Reported Height 5'10   Patient-Reported Systolic 698   Patient-Reported Diastolic 76   Patient-Reported Pulse 60   Patient-Reported Temperature 98.1        Physical Exam    [INSTRUCTIONS:  \"[x]\" Indicates a positive item  \"[]\" Indicates a negative item  -- DELETE ALL ITEMS NOT EXAMINED]    Constitutional: [x] Appears well-developed and well-nourished [x] No apparent distress      [] Abnormal -     Mental status: [x] Alert and awake  [x] Oriented to person/place/time [x] Able to follow commands    [] Abnormal -     Eyes:   EOM    [x]  Normal    [] Abnormal -   Sclera  [x]  Normal    [] Abnormal -          Discharge [x]  None visible   [] Abnormal -     HENT: [x] Normocephalic, atraumatic  [] Abnormal -   [x] Mouth/Throat: Mucous membranes are moist    External Ears [x] Normal  [] Abnormal -    Neck: [x] No visualized mass [] Abnormal -     Pulmonary/Chest: [x] Respiratory effort normal   [x] No visualized signs of difficulty breathing or respiratory distress        [] Abnormal -      Musculoskeletal:   [x] Normal gait with no signs of ataxia         [x] Normal range of motion of neck        [] Abnormal -   He could make a fist but he says muscles are sore with fist making and different muscles as explained about soreness.   Neurological:        [x] No Facial Asymmetry (Cranial nerve 7 motor function) (limited exam due to video visit)          [x] No gaze palsy        [] Abnormal -          Skin:        [x] No significant exanthematous lesions or discoloration noted on facial skin         [] Abnormal -            Psychiatric:       [x] Normal Affect [] Abnormal -        [x] No Hallucinations    Other pertinent observable physical exam findings:-          On this date 1/6/2022 I have spent 30 minutes reviewing previous notes, test results and face to face (virtual) with the patient discussing the diagnosis and importance of compliance with the treatment plan as well as documenting on the day of the visitJojo Bradydaniel Hardyns, was evaluated through a synchronous (real-time) audio-video encounter. The patient (or guardian if applicable) is aware that this is a billable service. Verbal consent to proceed has been obtained within the past 12 months. The visit was conducted pursuant to the emergency declaration under the [address] [place] [address], [address] [place] [address] authority and the [employer] and [employer] General Act. Patient identification was verified, and a caregiver was present when appropriate. The patient was located in a state where the provider was credentialed to provide care. An electronic signature was used to authenticate this note.     --Mariano Lagos MD

## 2022-01-07 ENCOUNTER — CARE COORDINATION (OUTPATIENT)
Dept: CARE COORDINATION | Age: 62
End: 2022-01-07

## 2022-01-07 NOTE — CARE COORDINATION
Ambulatory Care Coordination Note  1/7/2022  CM Risk Score: 6  Charlson 10 Year Mortality Risk Score: 47%     ACC: Kala Freeman, MARIUSZ  [facility]    Hx: CHB, HTN, Cardiomyopathy, AICD, IESHA, Type 2 DM, COVID-19, CHF, Hyponatremia    Summary Note: The pt stated he has experienced muscle pain in his upper and lower extremities (stiffness and soreness). The pt stated he thought the Actos was causing the pain so he had discontinued the medication. The pt stated he had a virtual appointment with his PCP and was told that Actos does not normally cause muscle pain and was instructed to resume taking the medication. The PCP added Jardiance to the pt's diabetic medications. The RN, ACM explained to the pt that his A1C on 10/7/21 was 11.8 and a normal A1C is 5.4. The pt stated he believes his A1C elevated d/t having COVID-19 in 10/2021. The pt stated he also thinks the muscle pain may be from the COVID-19 vaccine. The pt was working at the time of this call. The RN, ACM asked the pt if she could send the pt a packet with information on Diabetes and meal planning. The pt stated he would appreciate the help. Plan:  The pt agreed to try the Actos again. Continue all current medications. Handouts mailed to the pt: Why High Blood Glucose Is a Problem  High blood sugar  Planning healthy meals  Diabetes zones: Know your zone. Manage your disease. Ambulatory Care Coordination Assessment    Care Coordination Protocol  Program Enrollment: Complex Care  Referral from Primary Care Provider: No  Week 1 - Initial Assessment     Do you have all of your prescriptions and are they filled?: Yes  Barriers to medication adherence: None  How often do you have trouble taking your medications the way you have been told to take them?: I always take them as prescribed.      Do you have Home O2 Therapy?: No      Is patient able to live independently?: Yes     Current Housing: Private Residence              Are you experiencing loss of meaning?: No  Are you experiencing loss of hope and peace?: No     Thinking about your patient's physical health needs, are there any symptoms or problems (risk indicators) you are unsure about that require further investigation?: No identified areas of uncertainly or problems already being investigated   Are the patients physical health problems impacting on their mental well-being?: No identified areas of concern   Are there any problems with your patients lifestyle behaviors (alcohol, drugs, diet, exercise) that are impacting on physical or mental well-being?: No identified areas of concern   Do you have any other concerns about your patients mental well-being? How would you rate their severity and impact on the patient?: No identified areas of concern   How would you rate their home environment in terms of safety and stability (including domestic violence, insecure housing, neighbor harassment)?: Consistently safe, supportive, stable, no identified problems   How do daily activities impact on the patient's well-being? (include current or anticipated unemployment, work, caregiving, access to transportation or other): No identified problems or perceived positive benefits   How would you rate their social network (family, work, friends)?: Good participation with social networks   How would you rate their financial resources (including ability to afford all required medical care)?: Financially secure, resources adequate, no identified problems   How wells does the patient now understand their health and well-being (symptoms, signs or risk factors) and what they need to do to manage their health?: Reasonable to good understanding and already engages in managing health or is willing to undertake better management   How well do you think your patient can engage in healthcare discussions?  (Barriers include language, deafness, aphasia, alcohol or drug problems, learning difficulties, concentration): Clear and open communication, no identified barriers   Do other services need to be involved to help this patient?: Other care/services not required at this time   Are current services involved with this patient well-coordinated? (Include coordination with other services you are now recommendation): All required care/services in place and well-coordinated   Suggested Interventions and Community Resources  Diabetes Education: In Process    Other Services or Interventions: Mailed Diabetic information and meal planning to pt. Zone Management Tools: In Process         Set up/Review Goals, Set up/Review an Education Plan              Prior to Admission medications    Medication Sig Start Date End Date Taking? Authorizing Provider   empagliflozin (JARDIANCE) 25 MG tablet Take 1 tablet by mouth daily 1/6/22   Shiv Gifford Cogan, MD   metFORMIN (GLUCOPHAGE) 500 MG tablet TAKE ONE TABLET BY MOUTH THREE TIMES A DAY WITH MEALS 12/23/21   Shiv Gifford Cogan, MD   pioglitazone (ACTOS) 30 MG tablet Take 1 tablet by mouth daily  Patient not taking: Reported on 1/6/2022 12/23/21   Jorge Mary MD   blood glucose test strips (TRUE METRIX BLOOD GLUCOSE TEST) strip 1 each by In Vitro route 2 times daily As needed. 11/10/21   Shiv Gifford Cogan, MD   Blood Glucose Monitoring Suppl (TRUE METRIX AIR GLUCOSE METER) DANGELO Check sugar twice a day 10/8/21   Shiv Gifford Cogan, MD   Lancets MISC 1 each by Does not apply route 2 times daily 10/8/21   Shiv Gifford Cogan, MD   metoprolol succinate (TOPROL XL) 100 MG extended release tablet Take 1 tablet by mouth daily 9/20/21   ZEUS Meyers - CNP   famotidine (PEPCID) 20 MG tablet Take 20 mg by mouth nightly as needed.     Historical Provider, MD       Future Appointments   Date Time Provider Isela Wasserman   1/10/2022  8:55 AM SCHEDULE, [facility] REMOTE TRANSMISSION [place] [facility]   3/24/2022  2:30 PM SCHEDULE, [facility] DEVICE CHECK [place] [facility]   3/24/2022  3:00 PM Dorna Dandy, MD [place] [facility]   4/11/2022  8:55 AM SCHEDULE, [place]SMISSION [place] [facility]   7/11/2022  8:55 AM SCHEDULE, [place] [facility]     ,   Diabetes Assessment    Meal Planning: Avoidance of concentrated sweets   How often do you test your blood sugar?: Daily   Do you have barriers with adherence to non-pharmacologic self-management interventions?  (Nutrition/Exercise/Self-Monitoring): No   Have you ever had to go to the ED for symptoms of low blood sugar?: No       No patient-reported symptoms       and   General Assessment    Do you have any symptoms that are causing concern?: Yes  Progression since Onset: Unchanged  Reported Symptoms: Pain (Comment: Muscle pain)

## 2022-01-10 ENCOUNTER — NURSE ONLY (OUTPATIENT)
Dept: CARDIOLOGY CLINIC | Age: 62
End: 2022-01-10
Payer: COMMERCIAL

## 2022-01-10 ENCOUNTER — TELEPHONE (OUTPATIENT)
Dept: INTERNAL MEDICINE CLINIC | Age: 62
End: 2022-01-10

## 2022-01-10 DIAGNOSIS — Z95.810 BIVENTRICULAR AUTOMATIC IMPLANTABLE CARDIOVERTER DEFIBRILLATOR IN SITU: Chronic | ICD-10-CM

## 2022-01-10 DIAGNOSIS — I42.9 CARDIOMYOPATHY, UNSPECIFIED TYPE (HCC): Chronic | ICD-10-CM

## 2022-01-10 DIAGNOSIS — I50.22 CHRONIC SYSTOLIC HEART FAILURE (HCC): Chronic | ICD-10-CM

## 2022-01-10 DIAGNOSIS — I44.2 CHB (COMPLETE HEART BLOCK) (HCC): Chronic | ICD-10-CM

## 2022-01-10 DIAGNOSIS — E11.9 TYPE 2 DIABETES MELLITUS WITHOUT COMPLICATION, WITHOUT LONG-TERM CURRENT USE OF INSULIN (HCC): ICD-10-CM

## 2022-01-10 PROCEDURE — 93296 REM INTERROG EVL PM/IDS: CPT | Performed by: INTERNAL MEDICINE

## 2022-01-10 PROCEDURE — 93295 DEV INTERROG REMOTE 1/2/MLT: CPT | Performed by: INTERNAL MEDICINE

## 2022-01-10 PROCEDURE — 93297 REM INTERROG DEV EVAL ICPMS: CPT | Performed by: INTERNAL MEDICINE

## 2022-01-10 NOTE — TELEPHONE ENCOUNTER
Patient needs a new scrip for    metFORMIN (GLUCOPHAGE) 500 MG tablet       To reflect the directions that  told him of 4X day     Please send to [address] [place] [address], [facility] - F [phone]    Please call to advise

## 2022-01-10 NOTE — PROGRESS NOTES
We received remote transmission from patient's CRT-D monitor at home. Transmission shows normal sensing and pacing function. No new arrhythmias/events recorded. Ap 1.8%  BiVp 99.9%     Possible Optivol fluid accumulation 10.12.21-01.04.22, now resolved and back to baseline. EP physician will review. See interrogation under cardiology tab in the [address] [place] [address] [address] field for more details. Will continue to monitor remotely.

## 2022-01-25 ENCOUNTER — CARE COORDINATION (OUTPATIENT)
Dept: CARE COORDINATION | Age: 62
End: 2022-01-25

## 2022-01-25 NOTE — CARE COORDINATION
The Ambulatory Care Manager called and left a voice message asking the pt to return the nurse's call at the pt's convenience. The RNJOSE also asked the pt to let her know if he received the packet of Diabetic education the nurse mailed to him.

## 2022-01-27 NOTE — CARE COORDINATION
The pt called and left a message for the nurse to call. The RN, JOSE returned the pt's call but had to leave another voice message asking the pt to return the call.

## 2022-02-08 ENCOUNTER — OFFICE VISIT (OUTPATIENT)
Dept: INTERNAL MEDICINE CLINIC | Age: 62
End: 2022-02-08
Payer: COMMERCIAL

## 2022-02-08 VITALS
HEIGHT: 70 IN | DIASTOLIC BLOOD PRESSURE: 82 MMHG | SYSTOLIC BLOOD PRESSURE: 128 MMHG | OXYGEN SATURATION: 96 % | TEMPERATURE: 97.1 F | WEIGHT: 209 LBS | BODY MASS INDEX: 29.92 KG/M2 | HEART RATE: 68 BPM

## 2022-02-08 DIAGNOSIS — I10 PRIMARY HYPERTENSION: ICD-10-CM

## 2022-02-08 DIAGNOSIS — Z12.11 SCREENING FOR COLON CANCER: ICD-10-CM

## 2022-02-08 DIAGNOSIS — E11.9 TYPE 2 DIABETES MELLITUS WITHOUT COMPLICATION, WITHOUT LONG-TERM CURRENT USE OF INSULIN (HCC): Primary | ICD-10-CM

## 2022-02-08 DIAGNOSIS — Z12.5 SCREENING FOR PROSTATE CANCER: ICD-10-CM

## 2022-02-08 PROCEDURE — 99214 OFFICE O/P EST MOD 30 MIN: CPT | Performed by: INTERNAL MEDICINE

## 2022-02-08 PROCEDURE — 3052F HG A1C>EQUAL 8.0%<EQUAL 9.0%: CPT | Performed by: INTERNAL MEDICINE

## 2022-02-08 RX ORDER — PIOGLITAZONEHYDROCHLORIDE 30 MG/1
30 TABLET ORAL DAILY
Qty: 90 TABLET | Refills: 0 | Status: SHIPPED | OUTPATIENT
Start: 2022-02-08 | End: 2022-05-10 | Stop reason: SDUPTHER

## 2022-02-08 RX ORDER — CALCIUM CITRATE/VITAMIN D3 200MG-6.25
1 TABLET ORAL 2 TIMES DAILY
Qty: 150 EACH | Refills: 5 | Status: SHIPPED | OUTPATIENT
Start: 2022-02-08 | End: 2022-05-10 | Stop reason: SDUPTHER

## 2022-02-08 RX ORDER — EMPAGLIFLOZIN 25 MG/1
1 TABLET, FILM COATED ORAL DAILY
Qty: 90 TABLET | Refills: 0 | Status: SHIPPED | OUTPATIENT
Start: 2022-02-08 | End: 2022-05-10 | Stop reason: SDUPTHER

## 2022-02-08 RX ORDER — TIZANIDINE 4 MG/1
4 TABLET ORAL EVERY 6 HOURS PRN
COMMUNITY
End: 2022-02-08 | Stop reason: ALTCHOICE

## 2022-02-08 ASSESSMENT — PATIENT HEALTH QUESTIONNAIRE - PHQ9
SUM OF ALL RESPONSES TO PHQ QUESTIONS 1-9: 0
1. LITTLE INTEREST OR PLEASURE IN DOING THINGS: 0
SUM OF ALL RESPONSES TO PHQ9 QUESTIONS 1 & 2: 0
SUM OF ALL RESPONSES TO PHQ QUESTIONS 1-9: 0
SUM OF ALL RESPONSES TO PHQ QUESTIONS 1-9: 0
2. FEELING DOWN, DEPRESSED OR HOPELESS: 0
SUM OF ALL RESPONSES TO PHQ QUESTIONS 1-9: 0

## 2022-02-08 ASSESSMENT — ENCOUNTER SYMPTOMS
BLOOD IN STOOL: 0
WHEEZING: 0
SINUS PRESSURE: 0
EYE PAIN: 0
EYE DISCHARGE: 0
SINUS PAIN: 0
SORE THROAT: 0
NAUSEA: 0
VOMITING: 0
SHORTNESS OF BREATH: 0
TROUBLE SWALLOWING: 0
RHINORRHEA: 0
CHEST TIGHTNESS: 0
ABDOMINAL PAIN: 0
COUGH: 0

## 2022-02-08 NOTE — PATIENT INSTRUCTIONS
Fasting blood work and follow-up after that. Try to see if colonoscopy records he can find otherwise see the gastroenterologist and get colonoscopy done    Get diabetes and eye checkup done. Keep diabetes medications same and keep losing weight. Extensive counseling done to keep diabetes in control to avoid kidney damage needing dialysis and avoid neuropathy needing toe amputations and avoid heart damage causing cardiomyopathy and congestive heart failure and avoid brain involvement including stroke and memory loss etc.    keep low carbohydrate diet. Breakfast : 4 egg white omelet or scrambled eggs with bell pepper,onion,tomato,spinach etc or boiled eggs for breakfast.     Lunch : Deck of card size meat (baked, broiled or grilled ) with leafy vegetables - spinach / kale / mustard green / lettuce etc. for salad. Supper : [employer] Fried grilled meats -deck of cards sized with 3/4th dinner plate full of vegetables -green bean or broccoli or cauliflower or carrots. If needed , buy bread 35 to 40 kcal- two slices at a time only and Tortillas 50- 90 kcal only at one meal.    Least or no bread, potato, pasta, highly processed foods, fried foods, sweets etc.    Lose weight or keep healthy weight and avoid weight gain by SCHEDULED 3 - 4 miles Elliptical machine or brisk walk and Dumbbell 2-5 lb arm exercises- 4 group muscles -4 sets of 15-50 repetitions--4 days a week--AS TOLERATED. Keep sugar record and bring it with every visit. Keep taking diabetes medications properly and let us know if you have made any diabetes medication changes. Try to keep fasting sugar less than 120 and 2 hours after meal sugar less than 150. keep Sugar in good control to avoid diabetes complications on kidneys , heart , eyes , nerves , brain etc.  Avoid low sugar with proper meals and 100-150 kcal snack. Keep yearly eye doctor follow up to monitor your diabetes effect on your eyes.   Make sure the medicines with meals and avoid angle bending of the knee. Hypertension    Extensive counseling done to keep low sodium diet and  Avoid potato chips, pretzels, sauerkraut , ham , sausage, meeks , salty crackers , salty french fries, salty nuts, salty popcorn etc.  Use only low sodium soups. No salted canned vegetables. Use fresh or frozen vegetables. No salt shaker use. May use Mrs. Lane as a salt substitute. Avoid weight gain. Regular exercise program.  Keep taking blood pressure medications regularly. If blood pressure staying above 130/85 or having side effects with blood pressure medications, then bring the blood pressure and pulse recorded twice a day to an appointment sooner than scheduled one. Discussed use, benefit, and side effects of prescribed medications. Barriers to compliance discussed. All patient questions answered. Pt voiced understanding. IF YOU NEED A PRESCRIPTION REFILL, THEN PLEASE GIVE US THREE WORKING DAYS TO REFILL A PRESCRIPTION. Office Hours to Answer Questions--Tuesday thru Friday --9.00 AM to 4.00 PM    Please get all OVER DUE shingles VACCINATIONS done at the pharmacy or health department as soon as possible.

## 2022-02-08 NOTE — PROGRESS NOTES
2022     Yossi Singleton (: 1960) is a 63 y.o. male, here for evaluation of the following medical concerns:    Chief Complaint   Patient presents with    Diabetes    Medication Refill     requesting #[id number]         Diabetes Mellitus     Home blood glucose log reviewed. Control is GOOD per home record. He  has no symptoms of hypoglycemia. The patient denied polyphagia, polydipsia, or polyuria. The available labs reviewed and analyzed and independent interpretation of the results explained at length. The last HBA1C and routine lab was Lab Results       Component                Value               Date                       LABA1C                   8.3                 2022            Lab Results       Component                Value               Date                       EAG                      191.5               2022            Lab Results       Component                Value               Date                       NA                       137                 2022                 K                        4.6                 2022                 CL                       100                 2022                 CREATININE               1.0                 2022                 BUN                      19                  2022                 CO2                      21                  2022                 LABMICR                  YES                 10/09/2021              Has been taking meds as ordered. He has no side effects from the current diabetes medications. Hypertension    Watching low-sodium diet. Taking blood pressure medications regularly. Blood pressure checked off and on and trying to keep a goal of blood pressure less than 130/85 most of the time.    Denies any chest pain / palpitation / shortness of breath / lightheadedness etc.   The available labs reviewed and analyzed and independent interpretation of the results explained at length. Lab Results       Component                Value               Date                       NA                       137                 01/08/2022                 K                        4.6                 01/08/2022                 K                        4.2                 10/13/2021                 CL                       100                 01/08/2022                 CO2                      21                  01/08/2022                 BUN                      19                  01/08/2022                 CREATININE               1.0                 01/08/2022                 GLUCOSE                  236                 01/08/2022                 CALCIUM                  9.7                 01/08/2022                Explained at length that overweight is not good for knee and hip joints. Overweight puts us at increased risk for high blood pressure and diabetes risk and must keep trying to get to ideal body weight with Body Mass Index less than 25. Colonoscopy negative last time but not sure when and I could not find any record    Left knee problem with different position and it goes away in 15 minutes or so when he just sits for the rest      Review of Systems   Constitutional: Negative for appetite change, chills, fever and unexpected weight change. HENT: Negative for congestion, ear discharge, ear pain, nosebleeds, rhinorrhea, sinus pressure, sinus pain, sore throat and trouble swallowing. Eyes: Negative for pain and discharge. Respiratory: Negative for cough, chest tightness, shortness of breath and wheezing. Cardiovascular: Negative for chest pain, palpitations and leg swelling. Gastrointestinal: Negative for abdominal pain, blood in stool, nausea and vomiting. Endocrine: Negative for polydipsia and polyphagia. Genitourinary: Negative for difficulty urinating, enuresis, flank pain and hematuria. Musculoskeletal: Negative for myalgias.    Skin: Negative for rash.   Neurological: Negative for facial asymmetry, weakness, light-headedness, numbness and headaches. Psychiatric/Behavioral: Negative for confusion. Current Outpatient Medications on File Prior to Visit   Medication Sig Dispense Refill    Blood Glucose Monitoring Suppl (TRUE METRIX AIR GLUCOSE METER) DANGELO Check sugar twice a day 1 each 0    Lancets MISC 1 each by Does not apply route 2 times daily 50 each 5    metoprolol succinate (TOPROL XL) 100 MG extended release tablet Take 1 tablet by mouth daily 90 tablet 3    famotidine (PEPCID) 20 MG tablet Take 20 mg by mouth nightly as needed. No current facility-administered medications on file prior to visit. Past Medical History:   Diagnosis Date    Acid reflux     Bradycardia     Cardiomyopathy (HCC)     CHB (complete heart block) (HCC)     CHF [facility]congestive heart failure) ([facility]     COVID-19     Hypertension     ICD (implantable cardioverter-defibrillator) battery depletion 5/11/16    Dual chamber [employer] - Dr. Alexis Lyman    Type 2 diabetes mellitus without complication, without long-term current use of insulin ([facility] 10/8/2021      Social History     Tobacco Use    Smoking status: Never Smoker    Smokeless tobacco: Never Used   Substance Use Topics    Alcohol use: No      Family History   Problem Relation Age of Onset    Diabetes Father     Arrhythmia Mother     Arthritis Mother         Vitals:    02/08/22 1555   BP: 128/82   Site: Left Upper Arm   Position: Sitting   Cuff Size: Large Adult   Pulse: 68   Temp: 97.1 °F (36.2 °C)   TempSrc: Infrared   SpO2: 96%   Weight: 209 lb (94.8 kg)   Height: 5' 10\" (1.778 m)     Estimated body mass index is 29.99 kg/m² as calculated from the following:    Height as of this encounter: 5' 10\" (1.778 m). Weight as of this encounter: 209 lb (94.8 kg). Physical Exam  Vitals and nursing note reviewed. Constitutional:       General: He is not in acute distress.   HENT:      Head: Normocephalic and atraumatic. Right Ear: Tympanic membrane, ear canal and external ear normal.      Left Ear: Tympanic membrane, ear canal and external ear normal.      Nose: Nose normal.   Eyes:      General: Lids are normal.      Extraocular Movements: Extraocular movements intact. Conjunctiva/sclera: Conjunctivae normal.      Pupils: Pupils are equal, round, and reactive to light. Neck:      Thyroid: No thyromegaly. Vascular: No JVD. Trachea: No tracheal deviation. Cardiovascular:      Rate and Rhythm: Normal rate and regular rhythm. Heart sounds: Normal heart sounds. No gallop. Pulmonary:      Effort: Pulmonary effort is normal. No respiratory distress. Breath sounds: Normal breath sounds. No wheezing or rales. Abdominal:      General: Bowel sounds are normal.      Palpations: Abdomen is soft. There is no mass. Tenderness: There is no abdominal tenderness. Musculoskeletal:         General: No tenderness. Cervical back: Neck supple. Comments: No leg edema or calf tenderness   Lymphadenopathy:      Cervical: No cervical adenopathy. Skin:     General: Skin is warm and dry. Findings: No rash. Neurological:      General: No focal deficit present. Mental Status: He is alert and oriented to person, place, and time. Cranial Nerves: No cranial nerve deficit. Sensory: No sensory deficit. Psychiatric:         Mood and Affect: Mood normal.         Behavior: Behavior normal.         Thought Content: Thought content normal.         Judgment: Judgment normal.         ASSESSMENT/PLAN:  1. Type 2 diabetes mellitus without complication, without long-term current use of insulin (HCC)    - empagliflozin (JARDIANCE) 25 MG tablet; Take 1 tablet by mouth daily  Dispense: 90 tablet; Refill: 0  - pioglitazone (ACTOS) 30 MG tablet; Take 1 tablet by mouth daily  Dispense: 90 tablet;  Refill: 0  - metFORMIN (GLUCOPHAGE) 500 MG tablet; TAKE ONE TABLET BY MOUTH FOUR TIMES A DAY WITH MEALS  Dispense: 360 tablet; Refill: 0  - blood glucose test strips (TRUE METRIX BLOOD GLUCOSE TEST) strip; 1 each by In Vitro route 2 times daily As needed. Dispense: 150 each; Refill: 5  - Hemoglobin A1C; Future  - AFL - Olga Breaux MD, (Comprehensive Medical and Surgical Ophthalmology, Sutureless Cataract Surgery) Ophthalmology, [facility]    2. Primary hypertension    - Comprehensive Metabolic Panel; Future  - Urinalysis Reflex to Culture; Future    3. Screening for prostate cancer    - PSA screening; Future    4. Screening for colon cancer    - AFL - Reece Torres MD, Gastroenterology, [facility]      Return in about 3 months (around 5/6/2022) for diabetes mellitus. Patient Instructions   Fasting blood work and follow-up after that. Try to see if colonoscopy records he can find otherwise see the gastroenterologist and get colonoscopy done    Get diabetes and eye checkup done. Keep diabetes medications same and keep losing weight. Extensive counseling done to keep diabetes in control to avoid kidney damage needing dialysis and avoid neuropathy needing toe amputations and avoid heart damage causing cardiomyopathy and congestive heart failure and avoid brain involvement including stroke and memory loss etc.    keep low carbohydrate diet. Breakfast : 4 egg white omelet or scrambled eggs with bell pepper,onion,tomato,spinach etc or boiled eggs for breakfast.     Lunch : Deck of card size meat (baked, broiled or grilled ) with leafy vegetables - spinach / kale / mustard green / lettuce etc. for salad. Supper : Wesly Peto grilled meats -deck of cards sized with 3/4th dinner plate full of vegetables -green bean or broccoli or cauliflower or carrots.     If needed , buy bread 35 to 40 kcal- two slices at a time only and Tortillas 50- 90 kcal only at one meal.    Least or no bread, potato, pasta, highly processed foods, fried foods, sweets etc.    Lose weight or keep healthy weight and avoid weight gain by SCHEDULED 3 - 4 miles Elliptical machine or brisk walk and Dumbbell 2-5 lb arm exercises- 4 group muscles -4 sets of 15-50 repetitions--4 days a week--AS TOLERATED. Keep sugar record and bring it with every visit. Keep taking diabetes medications properly and let us know if you have made any diabetes medication changes. Try to keep fasting sugar less than 120 and 2 hours after meal sugar less than 150. keep Sugar in good control to avoid diabetes complications on kidneys , heart , eyes , nerves , brain etc.  Avoid low sugar with proper meals and 100-150 kcal snack. Keep yearly eye doctor follow up to monitor your diabetes effect on your eyes. Make sure the medicines with meals and avoid angle bending of the knee. Hypertension    Extensive counseling done to keep low sodium diet and  Avoid potato chips, pretzels, sauerkraut , ham , sausage, meeks , salty crackers , salty french fries, salty nuts, salty popcorn etc.  Use only low sodium soups. No salted canned vegetables. Use fresh or frozen vegetables. No salt shaker use. May use Mrs. Lane as a salt substitute. Avoid weight gain. Regular exercise program.  Keep taking blood pressure medications regularly. If blood pressure staying above 130/85 or having side effects with blood pressure medications, then bring the blood pressure and pulse recorded twice a day to an appointment sooner than scheduled one. Discussed use, benefit, and side effects of prescribed medications. Barriers to compliance discussed. All patient questions answered. Pt voiced understanding. IF YOU NEED A PRESCRIPTION REFILL, THEN PLEASE GIVE US THREE WORKING DAYS TO REFILL A PRESCRIPTION. Office Hours to Answer Questions--Tuesday thru Friday --9.00 AM to 4.00 PM    Please get all OVER DUE shingles VACCINATIONS done at the pharmacy or health department as soon as possible.          Electronically signed by Jordin Coyle MD on 2/8/2022 at 4:36 PM     This dictation was generated by voice recognition computer software. Although all attempts are made to edit the dictation for accuracy, there may be errors in the transcription that are not intended.

## 2022-02-10 ENCOUNTER — CARE COORDINATION (OUTPATIENT)
Dept: CARE COORDINATION | Age: 62
End: 2022-02-10

## 2022-02-10 NOTE — CARE COORDINATION
RN, ACM Note:  The RN, ACM left a voice message asking the pt to return the RN, ACM's call and discuss whether he wanted to continue in Care Coordination or if he feels he is doing okay.

## 2022-02-16 ENCOUNTER — CARE COORDINATION (OUTPATIENT)
Dept: CARE COORDINATION | Age: 62
End: 2022-02-16

## 2022-02-16 NOTE — CARE COORDINATION
The pt left the RN, ACM a message that he has tried to return the RN, ACM's call but did not get through to the nurse. The pt asked the RN, ACM to call after 3:00 PM. The RN, ACM called at 3:57 PM and had to leave a voice message for the pt.

## 2022-02-16 NOTE — CARE COORDINATION
RN, ACM Note:  The pt is not engaged and has not been returning the RN, ACM's calls. The RN, ACM made a final outreach call and had to leave a message. The RN, ACM encouraged the pt to call the nurse if any health issues arise. The RN, ACM will sign off.

## 2022-03-02 ENCOUNTER — NURSE ONLY (OUTPATIENT)
Dept: CARDIOLOGY CLINIC | Age: 62
End: 2022-03-02
Payer: COMMERCIAL

## 2022-03-02 DIAGNOSIS — I50.22 CHRONIC SYSTOLIC HEART FAILURE (HCC): Chronic | ICD-10-CM

## 2022-03-02 DIAGNOSIS — R00.1 BRADYCARDIA: Chronic | ICD-10-CM

## 2022-03-02 DIAGNOSIS — I42.9 CARDIOMYOPATHY, UNSPECIFIED TYPE (HCC): Chronic | ICD-10-CM

## 2022-03-02 DIAGNOSIS — I44.2 CHB (COMPLETE HEART BLOCK) (HCC): Chronic | ICD-10-CM

## 2022-03-02 DIAGNOSIS — Z95.810 BIVENTRICULAR AUTOMATIC IMPLANTABLE CARDIOVERTER DEFIBRILLATOR IN SITU: Chronic | ICD-10-CM

## 2022-03-02 PROCEDURE — 93297 REM INTERROG DEV EVAL ICPMS: CPT | Performed by: NURSE PRACTITIONER

## 2022-03-02 PROCEDURE — G2066 INTER DEVC REMOTE 30D: HCPCS | Performed by: NURSE PRACTITIONER

## 2022-03-02 NOTE — PROGRESS NOTES
We received remote transmission from patient's CRT-D monitor at home. Transmission shows normal sensing and pacing function. No new arrhythmias/events recorded. Ap 1.6%  BiVp 99.6%     Optivol currently elevated up to 50 (below 60 threshold); TI back near reference line. Plateau noted on both Optivol/TI. Pt has f/u appt w device/UL 03.24.22.    NP will review. See interrogation under cardiology tab in the [address] [place] [address] [address] field for more details. Will continue to monitor remotely.

## 2022-03-10 PROBLEM — Z12.5 SCREENING FOR PROSTATE CANCER: Status: RESOLVED | Noted: 2022-02-08 | Resolved: 2022-03-10

## 2022-03-10 PROBLEM — Z12.11 SCREENING FOR COLON CANCER: Status: RESOLVED | Noted: 2022-02-08 | Resolved: 2022-03-10

## 2022-03-18 NOTE — PROGRESS NOTES
[facility]   Cardiac Electrophysiology Consultation   Date: 3/24/2022  Reason for Consultation:   Consult Requesting Physician: No att. providers found     Chief Complaint:   Chief Complaint   Patient presents with    6 Month Follow-Up     device check         HPI: Farhan Caballero is a 63 y.o. man with a PMH significant for HTN, high degree AV block, s/p dual ch MDT PPM (5/2014, Dr. Micha Hines), EF had been 55%, 100%  burden, developed NICMP, s/p upgrade to an ICD (5/11/2016), unable to place LV lead d/t inability to cannulate the CS, reattempt with an upgrade to a CRT device (8/17/2016, Dr. Micha Hines), increased PVCs and NSVT noted on device. Device interrogation (9/2021) showed that he paces 3.4% of the time in the atrium and 99% of the time in the ventricle. OptiVol was within normal limits. He had 2 nonsustained VT episodes 1 which appears to be a slow VT lasting about 6 seconds in length. Interval History: Today, he is here for 6 month follow up visit, presents in Paced Rhythm. States has lost ~ 25 # in past year, mostly dietary monitoring. Works full time. Patient denies lightheadedness, dizziness, chest pain, palpitations, orthopnea, edema, presyncope or syncope. Even though, his OptiVol is increased, he denies any clinical symptoms suggestive of congestive heart failure. There is no clinical signs of congestive heart failure. Device interrogation today shows normally functioning MDT CRTD with stable sensing and pacing thresholds. All sensing and pacing parameters are within normal range. Battery life 20 months  Underlying V dependent @ 30 bpm in VVI mode  AP 1.9%.  99.8%. BVP 99.9%  VSRp <0.1%  VS 0.2%  AT/AF burden 0%  PVC 10.4/hr  Optivol accumulation 05-Mar-2022- ongoing. Not on diuretic. Assessment:  1. NICMP, s/p MDT CRT-D, EF 55%, Toprol, Valsartan  2. CHB  3. PVCs, Toprol    Plan:  1. Continue remote device transmissions every 3 months  2.  Follow up with EP NP in 6 months  3. No changes to medications      Past Medical History:   Diagnosis Date    Acid reflux     Bradycardia     Cardiomyopathy (HCC)     CHB (complete heart block) (HCC)     [facility]est[facility] failure) [facility] [facility]     COVID-19     Hypertension     ICD (implantable cardioverter-defibrillator) battery depletion 5/11/16    Dual chamber Medtronic - Dr. Jamilah Gonzalez    Type 2 diabetes mellitus without complication, without long-term current use of insulin ([facility] [address] 10/8/2021        Past Surgical History:   Procedure Laterality Date    CARDIAC PACEMAKER PLACEMENT      COLONOSCOPY      Negative    COSMETIC SURGERY      PACEMAKER PLACEMENT      TONSILLECTOMY         Allergies: Allergies   Allergen Reactions    Doxycycline     Lisinopril Other (See Comments)     dizziness    Losartan     Septra [Sulfamethoxazole-Trimethoprim] Other (See Comments)     Pt states he breaks out    Sulfa Antibiotics      \"Breaks Out\"    Tetracyclines & Related      \"Breaks Out\"       Medication:   Prior to Admission medications    Medication Sig Start Date End Date Taking? Authorizing Provider   empagliflozin (JARDIANCE) 25 MG tablet Take 1 tablet by mouth daily 2/8/22  Yes Jesse Valderrama MD   pioglitazone (ACTOS) 30 MG tablet Take 1 tablet by mouth daily 2/8/22  Yes Jorge Mary MD   metFORMIN (GLUCOPHAGE) 500 MG tablet TAKE ONE TABLET BY MOUTH FOUR TIMES A DAY WITH MEALS 2/8/22  Yes Jorge Mary MD   blood glucose test strips (TRUE METRIX BLOOD GLUCOSE TEST) strip 1 each by In Vitro route 2 times daily As needed.  2/8/22  Yes Jesse Valderrama MD   Blood Glucose Monitoring Suppl (TRUE METRIX AIR GLUCOSE METER) DANGELO Check sugar twice a day 10/8/21  Yes Jorge Mary MD   Lancets MISC 1 each by Does not apply route 2 times daily 10/8/21  Yes Jesse Valderrama MD   metoprolol succinate (TOPROL XL) 100 MG extended release tablet Take 1 tablet by mouth daily 9/20/21  Yes Rajani Salvador, APRN - CNP   famotidine (PEPCID) 20 MG tablet Take 20 mg by mouth nightly as needed. Yes Historical Provider, MD       Social History:   reports that he has never smoked. He has never used smokeless tobacco. He reports that he does not drink alcohol and does not use drugs. Family History:  family history includes Arrhythmia in his mother; Arthritis in his mother; Diabetes in his father. Reviewed. Denies family history of sudden cardiac death, arrhythmia, premature CAD    Review of System:    · General ROS: negative for - chills, fever   · Psychological ROS: negative for - anxiety or depression  · Ophthalmic ROS: negative for - eye pain or loss of vision  · ENT ROS: negative for - epistaxis, headaches, nasal discharge, sore throat   · Allergy and Immunology ROS: negative for - hives, nasal congestion   · Hematological and Lymphatic ROS: negative for - bleeding problems, blood clots, bruising or jaundice  · Endocrine ROS: negative for - skin changes, temperature intolerance or unexpected weight changes  · Respiratory ROS: negative for - cough, hemoptysis, pleuritic pain, SOB, sputum changes or wheezing  · Cardiovascular ROS: Per HPI. · Gastrointestinal ROS: negative for - abdominal pain, blood in stools, diarrhea, hematemesis, melena, nausea/vomiting or swallowing difficulty/pain  · Genito-Urinary ROS: negative for - dysuria or incontinence  · Musculoskeletal ROS: negative for - joint swelling or muscle pain  · Neurological ROS: negative for - confusion, dizziness, gait disturbance, headaches, numbness/tingling, seizures, speech problems, tremors, visual changes or weakness  · Dermatological ROS: negative for - rash    Physical Examination:  Vitals:    03/24/22 1452   BP: 100/60   Pulse: 66       · Constitutional: Oriented. No distress. · Head: Normocephalic and atraumatic. · Mouth/Throat: Oropharynx is clear and moist.   · Eyes: Conjunctivae normal. EOM are normal.   · Neck: Normal range of motion. Neck supple. No rigidity. No JVD present. · Cardiovascular: Normal rate, regular rhythm, S1&S2 and intact distal pulses. · Pulmonary/Chest: Bilateral respiratory sounds. No wheezes. No rhonchi. · Abdominal: Soft. Bowel sounds present. No distension, No tenderness. · Musculoskeletal: No tenderness. No edema    · Lymphadenopathy: Has no cervical adenopathy. · Neurological: Alert and oriented. Cranial nerve appears intact, No Gross deficit   · Skin: Skin is warm and dry. No rash noted. · Psychiatric: Has a normal mood, affect and behavior     Labs:  Reviewed. ECG: reviewed, Sinus  Rhythm, with QRS duration 146  ms. QT  430 ms    Studies:   1. Event monitor:       2. Echo: 10/6/2020  Summary   Normal left ventricular size.   Mild concentric left ventricular hypertrophy.   Left ventricular systolic function appears at the lower limits of normal   with an estimated ejection fraction of 50%-55%.   Septal bounce likely due to pacing apparatus .   Normal diastolic function.   Normal right ventricular size with decreased systolic function.   Pacer / ICD wire is visualized in the right ventricle.   Aortic valve appears sclerotic but opens adequately.   Trivial tricuspid regurgitation.   Estimated pulmonary artery systolic pressure is at 20 mmHg assuming a right   atrial pressure of 3 mmHg. 3. Stress Test:        4. Cath: 2014, Dr Yordy Thompson  Angiographic Findings:  Right dominant system  Left Main:  Absent. 2 separate ostia for the LAD and Lcx.   Left Anterior Descending:  No angiographic obstructive disease.   Circumflex:  No angiographic obstructive disease.   Right Coronary:  No angiographic obstructive disease.   Left Ventriculogram:  Not performed; had echo.   Hemodynamics (mm Hg):  Left Ventricular Pressure:  203/5, 25  Central Aortic Pressure:  200/75   Conclusions:  No significant CAD      The MCOT, echocardiogram, stress test, and coronary angiography/PCI were reviewed by myself and used for my plan of care.     - The patient is counseled to follow a low salt diet to assure blood pressure remains controlled for cardiovascular risk factor modification.   - The patient is counseled to avoid excess caffeine, and energy drinks as this may exacerbated ectopy and arrhythmia. - The patient is counseled to get regular exercise 3-5 times per week to control cardiovascular risk factors. - The patient is counseled to lose weigt to control cardiovascular risk factors. -The patient is counseled about the health hazards of smoking including cardiovascular side effects and its impact on morbidity and mortality. Thank you for allowing me to participate in the care of Keena Almeida. All questions and concerns were addressed to the patient/family. Alternatives to my treatment were discussed. Phoenix Winkler RN, am scribing for and in the presence of Dr. Mike Dejesus. 03/24/22 3:18 PM  MARIUSZ Nolasco MD, personally performed the services prescribed in this documentation as scribed in my presence and it is both accurate and complete.        Zo Doyle MD  Cardiac Electrophysiology  [facility]

## 2022-03-24 ENCOUNTER — NURSE ONLY (OUTPATIENT)
Dept: CARDIOLOGY CLINIC | Age: 62
End: 2022-03-24
Payer: COMMERCIAL

## 2022-03-24 ENCOUNTER — OFFICE VISIT (OUTPATIENT)
Dept: CARDIOLOGY CLINIC | Age: 62
End: 2022-03-24
Payer: COMMERCIAL

## 2022-03-24 VITALS
WEIGHT: 207 LBS | DIASTOLIC BLOOD PRESSURE: 60 MMHG | SYSTOLIC BLOOD PRESSURE: 100 MMHG | HEART RATE: 66 BPM | BODY MASS INDEX: 29.7 KG/M2

## 2022-03-24 DIAGNOSIS — I42.9 CARDIOMYOPATHY, UNSPECIFIED TYPE (HCC): Chronic | ICD-10-CM

## 2022-03-24 DIAGNOSIS — R00.1 BRADYCARDIA: Chronic | ICD-10-CM

## 2022-03-24 DIAGNOSIS — I44.2 CHB (COMPLETE HEART BLOCK) (HCC): ICD-10-CM

## 2022-03-24 DIAGNOSIS — I50.22 CHRONIC SYSTOLIC HEART FAILURE (HCC): Chronic | ICD-10-CM

## 2022-03-24 DIAGNOSIS — I44.2 CHB (COMPLETE HEART BLOCK) (HCC): Chronic | ICD-10-CM

## 2022-03-24 DIAGNOSIS — I50.22 CHRONIC SYSTOLIC HEART FAILURE (HCC): Primary | ICD-10-CM

## 2022-03-24 DIAGNOSIS — Z95.810 BIVENTRICULAR AUTOMATIC IMPLANTABLE CARDIOVERTER DEFIBRILLATOR IN SITU: Chronic | ICD-10-CM

## 2022-03-24 DIAGNOSIS — R00.1 BRADYCARDIA: ICD-10-CM

## 2022-03-24 PROCEDURE — 93289 INTERROG DEVICE EVAL HEART: CPT | Performed by: INTERNAL MEDICINE

## 2022-03-24 PROCEDURE — 99214 OFFICE O/P EST MOD 30 MIN: CPT | Performed by: INTERNAL MEDICINE

## 2022-03-24 PROCEDURE — 93284 PRGRMG EVAL IMPLANTABLE DFB: CPT | Performed by: INTERNAL MEDICINE

## 2022-03-24 PROCEDURE — 93290 INTERROG DEV EVAL ICPMS IP: CPT | Performed by: INTERNAL MEDICINE

## 2022-03-24 NOTE — PROGRESS NOTES
Patient comes in for programming evaluation on their Medtronic CRTD. Last remote 1.10.2022    All sensing and pacing parameters are within normal range. Battery life 20 months  Underlying V dependent @ 30 bpm in VVI mode  AP 1.9%.  99.8%. BVP 99.9%  VSRp <0.1%  VS 0.2%  AT/AF burden 0%  PVC 10.4/hr  No treated/monitored episodes noted since 9.21.2021  0 Effective CRT episodes  0 VS episodes  Patient remains on metoprolol. No changes need to be made at this time. Please see interrogation for more detail. Optivol accumulation 05-Mar-2022- ongoing. Not on diuretic. Will forward to JOSTIN for review. Patient will see Dr. Zuri Zamudio today  and follow up in 3 months in office or remotely.

## 2022-03-24 NOTE — Clinical Note
Patient seen by device and Dr. Rey Patel today in [place]. Optivol accumulation 3.5.2022-ongoing. Patient does not appear to be on a diuretic. Please advise.

## 2022-04-11 ENCOUNTER — NURSE ONLY (OUTPATIENT)
Dept: CARDIOLOGY CLINIC | Age: 62
End: 2022-04-11
Payer: COMMERCIAL

## 2022-04-11 ENCOUNTER — TELEPHONE (OUTPATIENT)
Dept: CARDIOLOGY CLINIC | Age: 62
End: 2022-04-11

## 2022-04-11 DIAGNOSIS — R00.1 BRADYCARDIA: Chronic | ICD-10-CM

## 2022-04-11 DIAGNOSIS — I50.22 CHRONIC SYSTOLIC HEART FAILURE (HCC): Chronic | ICD-10-CM

## 2022-04-11 DIAGNOSIS — Z95.810 BIVENTRICULAR AUTOMATIC IMPLANTABLE CARDIOVERTER DEFIBRILLATOR IN SITU: Chronic | ICD-10-CM

## 2022-04-11 DIAGNOSIS — I44.2 CHB (COMPLETE HEART BLOCK) (HCC): Chronic | ICD-10-CM

## 2022-04-11 DIAGNOSIS — I42.9 CARDIOMYOPATHY, UNSPECIFIED TYPE (HCC): Chronic | ICD-10-CM

## 2022-04-11 PROCEDURE — 93297 REM INTERROG DEV EVAL ICPMS: CPT | Performed by: INTERNAL MEDICINE

## 2022-04-11 PROCEDURE — 93295 DEV INTERROG REMOTE 1/2/MLT: CPT | Performed by: INTERNAL MEDICINE

## 2022-04-11 PROCEDURE — 93296 REM INTERROG EVL PM/IDS: CPT | Performed by: INTERNAL MEDICINE

## 2022-04-11 NOTE — PROGRESS NOTES
We received remote transmission from patient's CRT-D monitor at home. Transmission shows normal sensing and pacing function. DEANDRE in 19months. No new arrhythmias/events recorded. Ap 1.1%  BiVp 99.8%     Possible Optivol fluid accumulation 03.05.22-ongoing, currently elevated up to 100 w correlating TI trend below reference line. Office staff notified to contact pt to assess for possible CHF sx.     EP physician will review. See interrogation under cardiology tab in the [address] [place] [address] [address] field for more details. Will continue to monitor remotely.

## 2022-04-11 NOTE — TELEPHONE ENCOUNTER
We received remote transmission from patient's CRT-D monitor at home. Possible Optivol fluid accumulation 03.05.22-ongoing, currently elevated up to 100 w correlating TI trend below reference line. Please contact pt to assess for possible CHF sx.

## 2022-04-11 NOTE — TELEPHONE ENCOUNTER
Spoke with pt, pt is not experiencing any sx's of SOB or swelling. Advised pt to call the office with any future questions or concerns. Pt verbalized understanding.

## 2022-05-03 ENCOUNTER — TELEPHONE (OUTPATIENT)
Dept: CARDIOLOGY CLINIC | Age: 62
End: 2022-05-03

## 2022-05-03 NOTE — TELEPHONE ENCOUNTER
----- Message from Tiffany Gardner MA sent at 3/24/2022  2:56 PM EDT -----  Patient seen by device and Dr. Linda Patterson today in [place]. Optivol accumulation 3.5.2022-ongoing. Patient does not appear to be on a diuretic. Please advise. C/C: C diff infection , aspiration pneumonia       Pt is awake and alert  Denies fever and chills  Reports cough   Denies abdomen pain   Stool X 2       Current Facility-Administered Medications   Medication Dose Route Frequency Provider Last Rate Last Dose    magnesium sulfate 1 g in dextrose 5% 100 mL IVPB  1 g Intravenous Once Shannon Reagan MD        famotidine (PEPCID) tablet 20 mg  20 mg Oral BID Zohreh Frye MD   20 mg at 05/21/19 1156    levETIRAcetam (KEPPRA) tablet 1,000 mg  1,000 mg Oral BID Zohreh Frye MD   1,000 mg at 05/21/19 1158    levothyroxine (SYNTHROID) tablet 50 mcg  50 mcg Oral Daily Zohreh Frye MD   50 mcg at 05/21/19 1156    metoprolol tartrate (LOPRESSOR) tablet 50 mg  50 mg Oral BID Zohreh Frye MD   50 mg at 05/21/19 1156    lacosamide (VIMPAT) 100 mg in dextrose 5 % 50 mL IVPB  100 mg Intravenous BID Marcy Watt MD   Stopped at 05/21/19 1343    sodium chloride (Inhalant) 3 % nebulizer solution 4 mL  4 mL Nebulization BID Marcy Watt MD   4 mL at 05/21/19 0804    albuterol (ACCUNEB) nebulizer solution 0.63 mg  0.63 mg Nebulization 4x daily Marcy Watt MD   0.63 mg at 05/21/19 1145    aztreonam (AZACTAM) 2 g IVPB mini-bag  2 g Intravenous Q8H Marcy Watt MD   Stopped at 05/21/19 1342    pregabalin (LYRICA) capsule 200 mg  200 mg Oral 3 times per day Marcy Watt MD   200 mg at 05/21/19 0507    vancomycin (VANCOCIN) oral solution 500 mg  500 mg Oral 4 times per day Marcy Watt MD   500 mg at 05/21/19 1155    lactobacillus (CULTURELLE) capsule 1 capsule  1 capsule Oral BID Marcy Watt MD   1 capsule at 05/21/19 1157    morphine (PF) injection 2 mg  2 mg Intravenous Q4H PRN Marcy Watt MD   2 mg at 05/13/19 1827    acetaminophen (TYLENOL) 160 MG/5ML solution 650 mg  650 mg Oral Q6H PRN Marcy Watt MD   650 mg at 05/20/19 8287    chlorhexidine (PERIDEX) 0.12 % solution 15 mL  15 mL Mouth/Throat BID Marcy Watt MD 15 mL at 05/21/19 1155    sodium chloride flush 0.9 % injection 10 mL  10 mL Intravenous PRN Pankaj Denton MD   10 mL at 05/11/19 0504    heparin flush 100 UNIT/ML injection 300 Units  3 mL Intravenous 2 times per day Pankaj Denton MD   300 Units at 05/21/19 1155    heparin flush 100 UNIT/ML injection 300 Units  3 mL Intracatheter PRN Pankaj Denton MD        heparin (porcine) injection 5,000 Units  5,000 Units Subcutaneous TID Pankaj Denton MD   5,000 Units at 05/21/19 1012    vitamin D (ERGOCALCIFEROL) capsule 50,000 Units  50,000 Units Oral Weekly Pankaj Denton MD   Stopped at 05/08/19 2015    sodium chloride flush 0.9 % injection 10 mL  10 mL Intravenous 2 times per day Pankaj Denton MD   10 mL at 05/21/19 1155    sodium chloride flush 0.9 % injection 10 mL  10 mL Intravenous PRN Pankaj Denton MD   10 mL at 05/15/19 0304    magnesium hydroxide (MILK OF MAGNESIA) 400 MG/5ML suspension 30 mL  30 mL Oral Daily PRN Pankaj Denton MD        ondansetron (ZOFRAN) injection 4 mg  4 mg Intravenous Q6H PRN Pankaj Denton MD   4 mg at 05/15/19 2008    mineral oil-hydrophilic petrolatum (HYDROPHOR) ointment   Topical TID Pankaj Denton MD        And    mineral oil-hydrophilic petrolatum (HYDROPHOR) ointment   Topical TID PRN Pankaj Denton MD               REVIEW OF SYSTEMS:       CONSTITUTIONAL: Denies fever    RESPIRATORY : Reports cough   GASTROINTESTINAL: loose stool   GENITOURINARY:  No dysuria    INTEGUMENT:no rash   MUSCULOSKELETAL:  Weakness   NEUROLOGICAL:  awake and alert    Objective:    BP (!) 142/78   Pulse 136   Temp 97.8 °F (36.6 °C) (Temporal)   Resp 20   Ht 5' (1.524 m)   Wt 196 lb 3.2 oz (89 kg)   SpO2 94%   BMI 38.32 kg/m²       General Appearance:    Awake and alert      Head:    Normocephalic, atraumatic   Eyes:    No pallor, no icterus,   Ears:    No obvious deformity or drainage.    Nose:   No nasal drainage   Throat:   Mucosa dry, no oral thrush   Neck: Supple, no lymphadenopathy   Lungs:     Bilateral rhonchi    Heart:    Regular rate and rhythm   Abdomen:     Soft, bowel sounds present , loose stool     Extremities:   + edema, erythema + ,warm    Pulses:   Dorsalis pedis palpable    Skin:    No erythema       CBC with Differential:      Lab Results   Component Value Date    WBC 5.8 05/21/2019    RBC 3.19 05/21/2019    HGB 10.6 05/21/2019    HCT 32.9 05/21/2019     05/21/2019    .1 05/21/2019    MCH 33.2 05/21/2019    MCHC 32.2 05/21/2019    RDW 15.5 05/21/2019    NRBC 0.9 05/10/2019    LYMPHOPCT 24.7 05/21/2019    PROMYELOPCT 0.9 05/08/2019    MONOPCT 9.3 05/21/2019    BASOPCT 0.7 05/21/2019    MONOSABS 0.54 05/21/2019    LYMPHSABS 1.43 05/21/2019    EOSABS 0.27 05/21/2019    BASOSABS 0.04 05/21/2019       CMP:    Lab Results   Component Value Date     05/21/2019    K 3.3 05/21/2019     05/21/2019    CO2 24 05/21/2019    BUN 9 05/21/2019    CREATININE 0.4 05/21/2019    GFRAA >60 05/21/2019    LABGLOM >60 05/21/2019    GLUCOSE 126 05/21/2019    PROT 6.2 05/21/2019    LABALBU 3.0 05/21/2019    LABALBU 4.4 08/09/2011    CALCIUM 8.6 05/21/2019    BILITOT 0.6 05/21/2019    ALKPHOS 56 05/21/2019    AST 24 05/21/2019    ALT 22 05/21/2019       Hepatic Function Panel:    Lab Results   Component Value Date    ALKPHOS 56 05/21/2019    ALT 22 05/21/2019    AST 24 05/21/2019    PROT 6.2 05/21/2019    BILITOT 0.6 05/21/2019    BILIDIR 0.2 05/21/2019    IBILI 0.4 05/21/2019    LABALBU 3.0 05/21/2019    LABALBU 4.4 08/09/2011       MICROBIOLOGY:     Blood culture - neg to date  Tip cx - VRE , CONS    Urine cx - Candida         Radiology :     Chest x ray - bilateral congestion        IMPRESSION:      1. Septic shock - improved   2. Sever  C diff infection - improved    3. Pneumonia - Aspiration   4. Leukocytosis - improved   5. Respiratory failure - improved   6.  Candiduria     RECOMMENDATIONS:      1. Vancomycin 500 mg po q 6 hrs ( continue for 7 days after stopping azatreonam )   2.  Aztreonam 2 grams IV q 8 hrs  ( day 6 of 7 )            2:20 PM      5/21/2019

## 2022-05-10 ENCOUNTER — OFFICE VISIT (OUTPATIENT)
Dept: INTERNAL MEDICINE CLINIC | Age: 62
End: 2022-05-10
Payer: COMMERCIAL

## 2022-05-10 VITALS
SYSTOLIC BLOOD PRESSURE: 124 MMHG | WEIGHT: 206.2 LBS | BODY MASS INDEX: 29.52 KG/M2 | TEMPERATURE: 97.5 F | HEIGHT: 70 IN | DIASTOLIC BLOOD PRESSURE: 70 MMHG | OXYGEN SATURATION: 98 % | HEART RATE: 67 BPM

## 2022-05-10 DIAGNOSIS — E11.9 TYPE 2 DIABETES MELLITUS WITHOUT COMPLICATION, WITHOUT LONG-TERM CURRENT USE OF INSULIN (HCC): Primary | ICD-10-CM

## 2022-05-10 DIAGNOSIS — I10 PRIMARY HYPERTENSION: ICD-10-CM

## 2022-05-10 DIAGNOSIS — Z12.11 SCREENING FOR COLON CANCER: ICD-10-CM

## 2022-05-10 DIAGNOSIS — R97.20 ELEVATED PSA: ICD-10-CM

## 2022-05-10 PROCEDURE — 3044F HG A1C LEVEL LT 7.0%: CPT | Performed by: INTERNAL MEDICINE

## 2022-05-10 PROCEDURE — 99214 OFFICE O/P EST MOD 30 MIN: CPT | Performed by: INTERNAL MEDICINE

## 2022-05-10 RX ORDER — PIOGLITAZONEHYDROCHLORIDE 30 MG/1
30 TABLET ORAL DAILY
Qty: 90 TABLET | Refills: 1 | Status: SHIPPED | OUTPATIENT
Start: 2022-05-10 | End: 2022-09-12 | Stop reason: SDUPTHER

## 2022-05-10 RX ORDER — CALCIUM CITRATE/VITAMIN D3 200MG-6.25
1 TABLET ORAL 2 TIMES DAILY
Qty: 150 EACH | Refills: 5 | Status: SHIPPED | OUTPATIENT
Start: 2022-05-10

## 2022-05-10 ASSESSMENT — PATIENT HEALTH QUESTIONNAIRE - PHQ9
1. LITTLE INTEREST OR PLEASURE IN DOING THINGS: 0
SUM OF ALL RESPONSES TO PHQ QUESTIONS 1-9: 0
2. FEELING DOWN, DEPRESSED OR HOPELESS: 0
SUM OF ALL RESPONSES TO PHQ QUESTIONS 1-9: 0
SUM OF ALL RESPONSES TO PHQ9 QUESTIONS 1 & 2: 0

## 2022-05-10 ASSESSMENT — ENCOUNTER SYMPTOMS
SINUS PRESSURE: 0
SINUS PAIN: 0
NAUSEA: 0
COUGH: 0
ABDOMINAL PAIN: 0
VOMITING: 0
EYE PAIN: 0
CHEST TIGHTNESS: 0
SORE THROAT: 0
WHEEZING: 0
SHORTNESS OF BREATH: 0
TROUBLE SWALLOWING: 0
EYE DISCHARGE: 0
BLOOD IN STOOL: 0
RHINORRHEA: 0

## 2022-05-10 NOTE — PATIENT INSTRUCTIONS
Fasting blood work before next appointment. See the urologist.    Keep diabetic diet and if sugar is starting to go down below 100 consistently then he does need to decrease his Jardiance to half a tablet a day. Get screening colonoscopy done. Keep losing weight. Keep low-sodium diet. With blood pressure medication    Discussed use, benefit, and side effects of prescribed medications. Barriers to compliance discussed. All patient questions answered. Pt voiced understanding. IF YOU NEED A PRESCRIPTION REFILL, THEN PLEASE GIVE US THREE WORKING DAYS TO REFILL A PRESCRIPTION. Office Hours to Answer Questions--Tuesday thru Friday --9.00 AM to 4.00 PM    Please get all OVER DUE VACCINATIONS done at the pharmacy or health department as soon as possible.

## 2022-05-10 NOTE — PROGRESS NOTES
5/10/2022     Saúl Lima (: 1960) is a 63 y.o. male, here for evaluation of the following medical concerns:    Chief Complaint   Patient presents with    Diabetes     3 month follow-up        Diabetes Mellitus     Home blood glucose log reviewed. Control is GOOD per home record. Has been doing very well and in the morning has been 120s to 130s and lately is going up and down to 116 and 2 hours after breakfast has been lately 118 and and 134 and 127 and before lunch has been 147 or even after lunch has been 135 and after dinner has been 121 132 and only initially it was up to 146 there and bedtime is 110 and 118    He  has no symptoms of hypoglycemia. The patient denied polyphagia, polydipsia, or polyuria. The available labs reviewed and analyzed and independent interpretation of the results explained at length. The last HBA1C and routine lab was Lab Results       Component                Value               Date                       LABA1C                   6.1                 2022            Lab Results       Component                Value               Date                       EAG                      128.4               2022            Lab Results       Component                Value               Date                       NA                       136                 2022                 K                        4.2                 2022                 CL                       99                  2022                 CREATININE               1.1                 2022                 BUN                      19                  2022                 CO2                      22                  2022                 LABMICR                  Not Indicated       2022              Has been taking meds as ordered. He has no side effects from the current diabetes medications. Hypertension    Watching low-sodium diet.   Taking blood pressure medications regularly. Blood pressure checked off and on and trying to keep a goal of blood pressure less than 130/85 most of the time. Denies any chest pain / palpitation / shortness of breath / lightheadedness etc.   The available labs reviewed and analyzed and independent interpretation of the results explained at length. Lab Results       Component                Value               Date                       NA                       136                 04/30/2022                 K                        4.2                 04/30/2022                 K                        4.2                 10/13/2021                 CL                       99                  04/30/2022                 CO2                      22                  04/30/2022                 BUN                      19                  04/30/2022                 CREATININE               1.1                 04/30/2022                 GLUCOSE                  145                 04/30/2022                 CALCIUM                  9.4                 04/30/2022                Frequent urination is the only symptom with elevated PSA and no blood in the urine and he has not seen a urologist so we will get him referral for that. He did get sick with the COVID-19 with first vaccination and fatigue for 10 days after second vaccination and explained to him make sure to drink a lot of fluids and Tylenol and see how he does with that but he should get his vaccination. He looked for colon screening but he could not find one and its been a number of years ago so he does need to check colonoscopy. Review of Systems   Constitutional: Negative for appetite change, chills, fever and unexpected weight change. HENT: Negative for congestion, ear discharge, ear pain, nosebleeds, rhinorrhea, sinus pressure, sinus pain, sore throat and trouble swallowing. Eyes: Negative for pain and discharge.    Respiratory: Negative for cough, chest tightness, shortness of breath and wheezing. Cardiovascular: Negative for chest pain, palpitations and leg swelling. Gastrointestinal: Negative for abdominal pain, blood in stool, nausea and vomiting. Endocrine: Negative for polydipsia and polyphagia. Genitourinary: Positive for frequency. Negative for difficulty urinating, enuresis, flank pain and hematuria. Musculoskeletal: Negative for myalgias. Skin: Negative for rash. Neurological: Negative for facial asymmetry, weakness, light-headedness, numbness and headaches. Psychiatric/Behavioral: Negative for confusion. Current Outpatient Medications on File Prior to Visit   Medication Sig Dispense Refill    metFORMIN (GLUCOPHAGE) 500 MG tablet TAKE ONE TABLET BY MOUTH FOUR TIMES A DAY WITH MEALS 360 tablet 0    Blood Glucose Monitoring Suppl (TRUE METRIX AIR GLUCOSE METER) DANGELO Check sugar twice a day 1 each 0    Lancets MISC 1 each by Does not apply route 2 times daily 50 each 5    metoprolol succinate (TOPROL XL) 100 MG extended release tablet Take 1 tablet by mouth daily 90 tablet 3    famotidine (PEPCID) 20 MG tablet Take 20 mg by mouth nightly as needed. No current facility-administered medications on file prior to visit.       Past Medical History:   Diagnosis Date    Acid reflux     Bradycardia     Cardiomyopathy (HCC)     CHB (complete heart block) (HCC)     [facility]estive [facility] failure) [facility]     COVID-19     Hypertension     ICD (implantable cardioverter-defibrillator) battery depletion 5/11/16    Dual chamber [employer] - Dr. Dimas Fitzgerald    Type 2 diabetes mellitus without complication, without long-term current use of insulin ([facility] 10/8/2021      Social History     Tobacco Use    Smoking status: Never Smoker    Smokeless tobacco: Never Used   Substance Use Topics    Alcohol use: No      Family History   Problem Relation Age of Onset    Diabetes Father     Arrhythmia Mother     Arthritis Mother         Vitals: 05/10/22 1508   BP: 124/70   Site: Left Upper Arm   Position: Sitting   Cuff Size: Large Adult   Pulse: 67   Temp: 97.5 °F (36.4 °C)   TempSrc: Temporal   SpO2: 98%   Weight: 206 lb 3.2 oz (93.5 kg)   Height: 5' 10\" (1.778 m)     Estimated body mass index is 29.59 kg/m² as calculated from the following:    Height as of this encounter: 5' 10\" (1.778 m). Weight as of this encounter: 206 lb 3.2 oz (93.5 kg). Physical Exam  Vitals and nursing note reviewed. Constitutional:       General: He is not in acute distress. HENT:      Head: Normocephalic and atraumatic. Right Ear: External ear normal.      Left Ear: External ear normal.   Eyes:      General: Lids are normal.      Conjunctiva/sclera: Conjunctivae normal.      Pupils: Pupils are equal, round, and reactive to light. Neck:      Thyroid: No thyromegaly. Vascular: No JVD. Trachea: No tracheal deviation. Cardiovascular:      Rate and Rhythm: Normal rate and regular rhythm. Heart sounds: Normal heart sounds. No gallop. Pulmonary:      Effort: Pulmonary effort is normal. No respiratory distress. Breath sounds: Normal breath sounds. No wheezing or rales. Abdominal:      General: Bowel sounds are normal.      Palpations: Abdomen is soft. There is no mass. Tenderness: There is no abdominal tenderness. Musculoskeletal:         General: No tenderness. Cervical back: Neck supple. Comments: No leg edema or calf tenderness   Lymphadenopathy:      Cervical: No cervical adenopathy. Skin:     General: Skin is warm and dry. Findings: No rash. Neurological:      Mental Status: He is alert and oriented to person, place, and time. Cranial Nerves: No cranial nerve deficit. Sensory: No sensory deficit. Psychiatric:         Behavior: Behavior normal.         Thought Content: Thought content normal.         ASSESSMENT/PLAN:  1.  Type 2 diabetes mellitus without complication, without long-term current use of insulin (HCC)    - empagliflozin (JARDIANCE) 25 MG tablet; Take 1 tablet by mouth daily  Dispense: 90 tablet; Refill: 1  - pioglitazone (ACTOS) 30 MG tablet; Take 1 tablet by mouth daily  Dispense: 90 tablet; Refill: 1  - blood glucose test strips (TRUE METRIX BLOOD GLUCOSE TEST) strip; 1 each by In Vitro route 2 times daily As needed. Dispense: 150 each; Refill: 5  - Basic Metabolic Panel; Future  - Hemoglobin A1C; Future  - Lipid Panel; Future  - Microalbumin / Creatinine Urine Ratio; Future  - Hepatic Function Panel; Future    2. Elevated PSA    - AFL - Cintia Edgar MD, Urology, [facility]    3. Primary hypertension  Low-sodium diet and blood pressure medicine    4. Screening for colon cancer    - AFL - Doc Coulter MD, Gastroenterology (ERCP & EUS), [facility]      Return in about 3 months (around 8/3/2022) for diabetes mellitus. Patient Instructions   Fasting blood work before next appointment. See the urologist.    Keep diabetic diet and if sugar is starting to go down below 100 consistently then he does need to decrease his Jardiance to half a tablet a day. Get screening colonoscopy done. Keep losing weight. Keep low-sodium diet. With blood pressure medication    Discussed use, benefit, and side effects of prescribed medications. Barriers to compliance discussed. All patient questions answered. Pt voiced understanding. IF YOU NEED A PRESCRIPTION REFILL, THEN PLEASE GIVE US THREE WORKING DAYS TO REFILL A PRESCRIPTION. Office Hours to Answer Questions--Tuesday thru Friday --9.00 AM to 4.00 PM    Please get all OVER DUE VACCINATIONS done at the pharmacy or health department as soon as possible. Electronically signed by Alice Diaz MD on 5/10/2022 at 4:11 PM     This dictation was generated by voice recognition computer software.  Although all attempts are made to edit the dictation for accuracy, there may be errors in the transcription that are not intended.

## 2022-06-08 ENCOUNTER — NURSE ONLY (OUTPATIENT)
Dept: CARDIOLOGY CLINIC | Age: 62
End: 2022-06-08
Payer: COMMERCIAL

## 2022-06-08 DIAGNOSIS — I50.22 CHRONIC SYSTOLIC HEART FAILURE (HCC): Chronic | ICD-10-CM

## 2022-06-08 DIAGNOSIS — R00.1 BRADYCARDIA: Chronic | ICD-10-CM

## 2022-06-08 DIAGNOSIS — Z95.810 BIVENTRICULAR AUTOMATIC IMPLANTABLE CARDIOVERTER DEFIBRILLATOR IN SITU: Chronic | ICD-10-CM

## 2022-06-08 DIAGNOSIS — I42.9 CARDIOMYOPATHY, UNSPECIFIED TYPE (HCC): Chronic | ICD-10-CM

## 2022-06-08 DIAGNOSIS — I44.2 CHB (COMPLETE HEART BLOCK) (HCC): Chronic | ICD-10-CM

## 2022-06-09 PROCEDURE — G2066 INTER DEVC REMOTE 30D: HCPCS | Performed by: NURSE PRACTITIONER

## 2022-06-09 PROCEDURE — 93297 REM INTERROG DEV EVAL ICPMS: CPT | Performed by: NURSE PRACTITIONER

## 2022-06-09 NOTE — PROGRESS NOTES
Remote transmission received from patient's CRT-D monitor at home. Transmission shows normal sensing and pacing function. DEANDRE in 20months. No new arrhythmias/events recorded. Ap 1.5%  BiVp 99.8%     Possible Optivol fluid accumulation 05.21.22-ongoing, currently elevated up to 100 w correlating TI trend below reference line. TriageHF Heart Failure Risk Status on 08-Jun-2022 is Medium*. NP will review. See interrogation under cardiology tab in the [address] [place] [address] [address] field for more details. Will continue to monitor remotely.

## 2022-06-10 ENCOUNTER — TELEPHONE (OUTPATIENT)
Dept: CARDIOLOGY CLINIC | Age: 62
End: 2022-06-10

## 2022-06-10 NOTE — TELEPHONE ENCOUNTER
Pt returned call, informed pt we were calling to check on him since his optivol on his device is trending up (explained to the pt this means the fluid level in his body is elevated). Pt denied sx's and explain he was doing fine. Advised pt to call the office with any future sx's or issues.

## 2022-06-10 NOTE — TELEPHONE ENCOUNTER
----- Message from ZEUS Mustafa CNP sent at 6/9/2022  3:34 PM EDT -----  Reviewed. , optivol trending up -looks like pt might be due an appt.  Praful Welch

## 2022-07-11 ENCOUNTER — NURSE ONLY (OUTPATIENT)
Dept: CARDIOLOGY CLINIC | Age: 62
End: 2022-07-11
Payer: COMMERCIAL

## 2022-07-11 DIAGNOSIS — I50.22 CHRONIC SYSTOLIC HEART FAILURE (HCC): Chronic | ICD-10-CM

## 2022-07-11 DIAGNOSIS — Z95.810 BIVENTRICULAR AUTOMATIC IMPLANTABLE CARDIOVERTER DEFIBRILLATOR IN SITU: Chronic | ICD-10-CM

## 2022-07-11 DIAGNOSIS — I42.9 CARDIOMYOPATHY, UNSPECIFIED TYPE (HCC): Chronic | ICD-10-CM

## 2022-07-11 DIAGNOSIS — R00.1 BRADYCARDIA: Chronic | ICD-10-CM

## 2022-07-11 DIAGNOSIS — I44.2 CHB (COMPLETE HEART BLOCK) (HCC): Chronic | ICD-10-CM

## 2022-07-11 PROCEDURE — 93295 DEV INTERROG REMOTE 1/2/MLT: CPT | Performed by: INTERNAL MEDICINE

## 2022-07-11 PROCEDURE — 93297 REM INTERROG DEV EVAL ICPMS: CPT | Performed by: NURSE PRACTITIONER

## 2022-07-11 PROCEDURE — 93296 REM INTERROG EVL PM/IDS: CPT | Performed by: INTERNAL MEDICINE

## 2022-07-11 NOTE — PROGRESS NOTES
Remote transmission received from patient's CRT-D monitor at home. Transmission shows normal sensing and pacing function. DEANDRE in 18months. No new arrhythmias/events recorded. EP physician will review. Ap 1.1%  BiVp 99.8%     Possible Optivol fluid accumulation 05.21.22-ongoing, currently elevated slightly above 60 threshold w correlating TI trend slightly below reference line. TriageHF Heart Failure Risk Status on 11-Jul-2022 is Medium**. NP will review. End of 91-day monitoring period 7/11/22. See interrogation under cardiology tab in the [address] [place] [address] [address] field for more details. Will continue to monitor remotely.

## 2022-07-15 DIAGNOSIS — E11.9 TYPE 2 DIABETES MELLITUS WITHOUT COMPLICATION, WITHOUT LONG-TERM CURRENT USE OF INSULIN (HCC): ICD-10-CM

## 2022-08-08 ENCOUNTER — TELEPHONE (OUTPATIENT)
Dept: CARDIOLOGY CLINIC | Age: 62
End: 2022-08-08

## 2022-08-08 NOTE — TELEPHONE ENCOUNTER
Patient dropped off some employment forms regarding working from home due to covid. Gave to Yadira. Patient would like to  paperwork when ready.   hollie

## 2022-08-11 ENCOUNTER — NURSE ONLY (OUTPATIENT)
Dept: CARDIOLOGY CLINIC | Age: 62
End: 2022-08-11
Payer: COMMERCIAL

## 2022-08-11 DIAGNOSIS — I50.22 CHRONIC SYSTOLIC HEART FAILURE (HCC): Chronic | ICD-10-CM

## 2022-08-11 DIAGNOSIS — I42.9 CARDIOMYOPATHY, UNSPECIFIED TYPE (HCC): ICD-10-CM

## 2022-08-11 DIAGNOSIS — Z95.810 BIVENTRICULAR AUTOMATIC IMPLANTABLE CARDIOVERTER DEFIBRILLATOR IN SITU: Primary | Chronic | ICD-10-CM

## 2022-08-11 PROCEDURE — G2066 INTER DEVC REMOTE 30D: HCPCS | Performed by: INTERNAL MEDICINE

## 2022-08-11 PROCEDURE — 93297 REM INTERROG DEV EVAL ICPMS: CPT | Performed by: INTERNAL MEDICINE

## 2022-08-11 NOTE — PROGRESS NOTES
Remote transmission received from patient's CRT-D monitor at home. Transmission shows normal sensing and pacing function. DEANDRE in 17 months. No new arrhythmias/events recorded. Ap 2.2%  BiVp 99.8%     Optivol is WNL. TriageHF Heart Failure Risk Status on 11-Aug-2022 is Medium*    HF MD to review. See interrogation under cardiology tab in the [address] [place] [address] [address] field for more details. Will continue to monitor remotely. (End of 31-day monitoring period 8/11/22).

## 2022-08-15 NOTE — TELEPHONE ENCOUNTER
Pt called in this morning checking to see if his paperwork for his job was filled out. Pt stated that he's not trying to hernandez JOSTIN, he just wanted to check because his job is putting pressure and time restraint on him to have it turned back in.       Please contact pt so he can pick paperwork up when done at [phone]

## 2022-08-19 ENCOUNTER — TELEPHONE (OUTPATIENT)
Dept: CARDIOLOGY CLINIC | Age: 62
End: 2022-08-19

## 2022-08-19 NOTE — TELEPHONE ENCOUNTER
Pt dropped off/mailed in a form requesting JOSTIN to fill out and sign stating that he needs to continue working from home for the [employer]. I called and lmom for pt stating that if he has a time constraint, JOSTIN would not be able to fill it out immediately as he has not been seen by her since 9/2020. However, I notice Fabian Low has seen him more recently. He also has an upcoming appt with JOSTIN on 9/20/22. If he can wait until that date, JOSTIN may be able to fill out the form. I also advised that we would hold on to the form until we  hear from him.

## 2022-08-22 ENCOUNTER — TELEPHONE (OUTPATIENT)
Dept: CARDIOLOGY CLINIC | Age: 62
End: 2022-08-22

## 2022-08-22 NOTE — TELEPHONE ENCOUNTER
Pt was r/s by provider on 9/14 and he needs a form filled out for his job. Form is stating he should continue working from home due to health.  He will drop off tomorrow

## 2022-08-23 ENCOUNTER — TELEPHONE (OUTPATIENT)
Dept: INTERNAL MEDICINE CLINIC | Age: 62
End: 2022-08-23

## 2022-08-23 NOTE — TELEPHONE ENCOUNTER
Left message on machine for patient to call back to schedule a VV appointment with Dr. Xochilt Gonzalez. Made an appt for VV at 8:40 am per Dr. Xochilt Gonzalez. Message left on machine for patient with appt date and time.

## 2022-08-23 NOTE — TELEPHONE ENCOUNTER
Patient started feeling bad yesterday (08/23/22). He took a covid test today and it was positive. Headache, sore throat, dizzy, cough, fever. Is interested in hearing about Paxlovid. He will leave that decision up to Dr. Michelle Olson. [facility] [id number] [address]    Please call and advise; okay to leave message.

## 2022-08-24 ENCOUNTER — TELEMEDICINE (OUTPATIENT)
Dept: INTERNAL MEDICINE CLINIC | Age: 62
End: 2022-08-24
Payer: COMMERCIAL

## 2022-08-24 DIAGNOSIS — E11.9 TYPE 2 DIABETES MELLITUS WITHOUT COMPLICATION, WITHOUT LONG-TERM CURRENT USE OF INSULIN (HCC): ICD-10-CM

## 2022-08-24 DIAGNOSIS — U07.1 COVID-19 VIRUS INFECTION: Primary | ICD-10-CM

## 2022-08-24 PROCEDURE — 3044F HG A1C LEVEL LT 7.0%: CPT | Performed by: INTERNAL MEDICINE

## 2022-08-24 PROCEDURE — 99213 OFFICE O/P EST LOW 20 MIN: CPT | Performed by: INTERNAL MEDICINE

## 2022-08-24 RX ORDER — PROMETHAZINE HYDROCHLORIDE AND CODEINE PHOSPHATE 6.25; 1 MG/5ML; MG/5ML
5 SYRUP ORAL 2 TIMES DAILY PRN
Qty: 118 ML | Refills: 0 | Status: SHIPPED | OUTPATIENT
Start: 2022-08-24 | End: 2022-09-05

## 2022-08-24 ASSESSMENT — ENCOUNTER SYMPTOMS
NAUSEA: 0
BLOOD IN STOOL: 0
COUGH: 1
EYE DISCHARGE: 0
ABDOMINAL PAIN: 0
SORE THROAT: 1
WHEEZING: 0
SINUS PAIN: 0
EYE PAIN: 0
CHEST TIGHTNESS: 0
SINUS PRESSURE: 0
SHORTNESS OF BREATH: 0
RHINORRHEA: 0
TROUBLE SWALLOWING: 0
VOMITING: 0

## 2022-08-24 NOTE — PATIENT INSTRUCTIONS
Paxlovid-explained at length to him and metallic taste explained and make sure checking sugar and doing pulse ox and Mucinex DM as well as the    Phenergan with codeine at his request at night preferably only. Explained -- chewable Vitamin C 500 mg 2 times a day and zinc 50 mg once a day and vitamin D3 2000 units every day --can help the body immune system to fight infection. Salt water gargles 3 times a day until throat symptoms completely gone and nothing to eat or drink for 1 hour after salt water gargles. Isolate yourself in the room with probably open window until all your symptoms are gone for 7 days. Ask any friends or family exposed to you get tested for COVID-19. Lot of fruits or vegetables or making a smoothie with yogurt. Can have ginger garlic turmeric etc. to boost their immune system. Ice packs for fever. Yogurt or probiotics. Avoid exertion. lot of fluids in daytime. Lean meats or eggs. Late night eating or drinking can cause cough. If coughing-lay on the belly rather than laying on the back. As laying on the belly puts you at higher risk of pneumonia. [place] or Robitussin DM or Mucinex DM for cough. For Allergies/sneezing- Loratadine 10 mg a day or fexofenadine 180 mg a day or  Cetirizine 10 mg a day can be sedating to some people    For Pain: Acetaminophen 500 mg- two tabs 3 x a day as needed. Avoid Motrin Advil Aleve or ibuprofen as in some patients it can affect the kidneys. Buy a pulse oximeter and check your pulse ox and make sure it is staying above 90%. If pulse oximeter shows pulse ox less than 90%-must go to ER. If short of breath or getting worse, then go to emergency room. Isolate until all three of these things are true:   1) Your symptoms are better x 3 days. 2) You have had no fever for at least 72 hours without using medicine that lowers fever, and   3) It has been 10 days since your symptoms started.       Discussed use, benefit, and side effects of prescribed medications. Barriers to compliance discussed. All patient questions answered. Pt voiced understanding. IF YOU NEED A PRESCRIPTION REFILL, THEN PLEASE GIVE US THREE WORKING DAYS TO REFILL A PRESCRIPTION. May go to urgent care or Emergency room or call to be seen in the office sooner than scheduled follow-up appointment,if condition worsens.

## 2022-08-24 NOTE — PROGRESS NOTES
June Cogan (:  1960) is a Established patient, here for evaluation of the following:    Assessment & Plan   Below is the assessment and plan developed based on review of pertinent history, physical exam, labs, studies, and medications. 1. COVID-19 virus infection  -     nirmatrelvir/ritonavir (PAXLOVID) 20 x 150 MG & 10 x 100MG TBPK; Take 3 tablets (two 150 mg nirmatrelvir and one 100 mg ritonavir tablets) by mouth every 12 hours for 5 days. , Disp-30 tablet, R-0Normal  -     promethazine-codeine (PHENERGAN WITH CODEINE) 6.25-10 MG/5ML syrup; Take 5 mLs by mouth 2 times daily as needed for Cough for up to 12 days. , Disp-118 mL, R-0Normal  2. Type 2 diabetes mellitus without complication, without long-term current use of insulin ([address]  Return in about 19 days (around 2022) for diabetes mellitus. Paxlovid-explained at length to him and metallic taste explained and make sure checking sugar and doing pulse ox and Mucinex DM as well as the    Phenergan with codeine at his request at night preferably only. Explained -- chewable Vitamin C 500 mg 2 times a day and zinc 50 mg once a day and vitamin D3 2000 units every day --can help the body immune system to fight infection. Salt water gargles 3 times a day until throat symptoms completely gone and nothing to eat or drink for 1 hour after salt water gargles. Isolate yourself in the room with probably open window until all your symptoms are gone for 7 days. Ask any friends or family exposed to you get tested for COVID-19. Lot of fruits or vegetables or making a smoothie with yogurt. Can have ginger garlic turmeric etc. to boost their immune system. Ice packs for fever. Yogurt or probiotics. Avoid exertion. lot of fluids in daytime. Lean meats or eggs. Late night eating or drinking can cause cough. If coughing-lay on the belly rather than laying on the back. As laying on the belly puts you at higher risk of pneumonia.     Constantine or Robitussin DM or Mucinex DM for cough. For Allergies/sneezing- Loratadine 10 mg a day or fexofenadine 180 mg a day or  Cetirizine 10 mg a day can be sedating to some people    For Pain: Acetaminophen 500 mg- two tabs 3 x a day as needed. Avoid Motrin Advil Aleve or ibuprofen as in some patients it can affect the kidneys. Buy a pulse oximeter and check your pulse ox and make sure it is staying above 90%. If pulse oximeter shows pulse ox less than 90%-must go to ER. If short of breath or getting worse, then go to emergency room. Isolate until all three of these things are true:   1) Your symptoms are better x 3 days. 2) You have had no fever for at least 72 hours without using medicine that lowers fever, and   3) It has been 10 days since your symptoms started. Discussed use, benefit, and side effects of prescribed medications. Barriers to compliance discussed. All patient questions answered. Pt voiced understanding. IF YOU NEED A PRESCRIPTION REFILL, THEN PLEASE GIVE US THREE WORKING DAYS TO REFILL A PRESCRIPTION. May go to urgent care or Emergency room or call to be seen in the office sooner than scheduled follow-up appointment,if condition worsens. Subjective   Covid sx  Monday-test yesterday positive and has    H/a /dizzy / sore throat and cough not letting him sleep last night he slept only 1 hour despite Mucinex DM and he wanted to Phenergan-Codeine medication and explained to him that it may be just use at night or occasional only because he will want to keep the infection in the lungs. He needs to cough up the mucus. Pulse ox 97-99    Last time with COVID-19 also vitamins C zinc and vitamin D3 he had taken and he is willing to that again. Diabetes Mellitus     Home blood glucose log reviewed. Control is GOOD per home record.   Sugar has been running around 100-120 and no low sugar and caution against low sugar with the this medication as it can cause metallic taste in the mouth and poor appetite so Jardiance he may have to stop if sugar getting below 100. Restart once sugar is above 100 again. He  has no symptoms of hypoglycemia. The patient denied polyphagia, polydipsia, or polyuria. The available labs reviewed and analyzed and independent interpretation of the results explained at length. The last HBA1C and routine lab was Lab Results       Component                Value               Date                       LABA1C                   6.1                 04/30/2022            Lab Results       Component                Value               Date                       EAG                      128.4               04/30/2022            Lab Results       Component                Value               Date                       NA                       136                 04/30/2022                 K                        4.2                 04/30/2022                 CL                       99                  04/30/2022                 CREATININE               1.1                 04/30/2022                 BUN                      19                  04/30/2022                 CO2                      22                  04/30/2022                 LABMICR                  Not Indicated       04/30/2022              Has been taking meds as ordered. He has no side effects from the current diabetes medications. Review of Systems   Constitutional:  Negative for appetite change, chills, fever and unexpected weight change. HENT:  Positive for congestion and sore throat. Negative for ear discharge, ear pain, nosebleeds, rhinorrhea, sinus pressure, sinus pain and trouble swallowing. Eyes:  Negative for pain and discharge. Respiratory:  Positive for cough. Negative for chest tightness, shortness of breath and wheezing. Cardiovascular:  Negative for chest pain, palpitations and leg swelling.    Gastrointestinal:  Negative for abdominal pain, blood in stool, nausea and vomiting. Endocrine: Negative for polydipsia and polyphagia. Genitourinary:  Negative for difficulty urinating, enuresis, flank pain and hematuria. Musculoskeletal:  Negative for myalgias. Skin:  Negative for rash. Neurological:  Positive for dizziness and headaches. Negative for facial asymmetry, weakness, light-headedness and numbness. Psychiatric/Behavioral:  Negative for confusion.          Objective   Patient-Reported Vitals  No data recorded     Physical Exam  [INSTRUCTIONS:  \"[x]\" Indicates a positive item  \"[]\" Indicates a negative item  -- DELETE ALL ITEMS NOT EXAMINED]    Constitutional: [x] Appears well-developed and well-nourished [x] No apparent distress      [] Abnormal -     Mental status: [x] Alert and awake  [x] Oriented to person/place/time [x] Able to follow commands    [] Abnormal -     Eyes:   EOM    [x]  Normal    [] Abnormal -   Sclera  [x]  Normal    [] Abnormal -          Discharge [x]  None visible   [] Abnormal -     HENT: [x] Normocephalic, atraumatic  [] Abnormal -   [] Mouth/Throat: Mucous membranes are moist  Nose and sinus congestion and sore throat  External Ears [x] Normal  [] Abnormal -    Neck: [x] No visualized mass [] Abnormal -     Pulmonary/Chest: [x] Respiratory effort normal   [x] No visualized signs of difficulty breathing or respiratory distress        [] Abnormal -   Cough during the virtual visit  Musculoskeletal:   [x] Normal gait with no signs of ataxia         [x] Normal range of motion of neck        [] Abnormal -     Neurological:        [x] No Facial Asymmetry (Cranial nerve 7 motor function) (limited exam due to video visit)          [x] No gaze palsy        [] Abnormal -          Skin:        [x] No significant exanthematous lesions or discoloration noted on facial skin         [] Abnormal -            Psychiatric:       [x] Normal Affect [] Abnormal -        [x] No Hallucinations    Other pertinent observable physical exam findings:- On this date 8/24/2022 I have spent 22 minutes reviewing previous notes, test results and face to face (virtual) with the patient discussing the diagnosis and importance of compliance with the treatment plan as well as documenting on the day of the visit. Rashad Doyle, was evaluated through a synchronous (real-time) audio-video encounter. The patient (or guardian if applicable) is aware that this is a billable service, which includes applicable co-pays. This Virtual Visit was conducted with patient's (and/or legal guardian's) consent. The visit was conducted pursuant to the emergency declaration under the [address] [place] [address], [address] [place] [address] authority and the [employer] Act. Patient identification was verified, and a caregiver was present when appropriate. The patient was located at Home: [place] [id number]. Provider was located at [facility] ([address] [place], [place] [address]t): [phone] [place] [address], [address] [place] [address],  [address].         --Brissa Schuler MD

## 2022-08-25 NOTE — TELEPHONE ENCOUNTER
Pt states he was wanting to know if Marielena Blank Received paper work that he dropped off for his job and wanted to know if any other information was needed please call [phone]

## 2022-08-29 NOTE — TELEPHONE ENCOUNTER
LVM for pt letting him know that I have sent paperwork to his employer and it is being scanned into his chart.

## 2022-08-30 ENCOUNTER — TELEPHONE (OUTPATIENT)
Dept: CARDIOLOGY CLINIC | Age: 62
End: 2022-08-30

## 2022-08-30 NOTE — TELEPHONE ENCOUNTER
The patient called regarding being charged every month for his device check readings. The patient would like to only be charged every 90 days like it was before. The patient states he will unplug his device, if he keeps being charged. Please call the patient back at  [phone] or [phone] to advise. The patient would like a call back as soon as possible.

## 2022-08-30 NOTE — TELEPHONE ENCOUNTER
Noted and remote appointments adjusted accordingly, with the next scheduled remote on 11/10/22, 91 days from last billed remote 8/11/22. Thanks!

## 2022-08-31 NOTE — TELEPHONE ENCOUNTER
Spoke with patient, explained that the billing has been changed to every 90 days as requested. He was also inquiring on providers from [facility]. I explained that Dr. Sky Donato and Zaida Olmedo are both providers from [facility]. One read the remote device check and Cecily read the [employer] which monitors whether there is fluid overload with patient. He was very grateful for this information, and thanked us for clarifying. He knows if there are any other concerns-he can call our office to discuss.

## 2022-09-12 ENCOUNTER — OFFICE VISIT (OUTPATIENT)
Dept: INTERNAL MEDICINE CLINIC | Age: 62
End: 2022-09-12
Payer: COMMERCIAL

## 2022-09-12 VITALS
WEIGHT: 210 LBS | TEMPERATURE: 96.9 F | SYSTOLIC BLOOD PRESSURE: 126 MMHG | DIASTOLIC BLOOD PRESSURE: 70 MMHG | BODY MASS INDEX: 30.06 KG/M2 | OXYGEN SATURATION: 97 % | HEIGHT: 70 IN | HEART RATE: 68 BPM

## 2022-09-12 DIAGNOSIS — E11.9 TYPE 2 DIABETES MELLITUS WITHOUT COMPLICATION, WITHOUT LONG-TERM CURRENT USE OF INSULIN (HCC): Primary | ICD-10-CM

## 2022-09-12 DIAGNOSIS — I10 PRIMARY HYPERTENSION: ICD-10-CM

## 2022-09-12 DIAGNOSIS — R53.83 OTHER FATIGUE: ICD-10-CM

## 2022-09-12 PROCEDURE — 3044F HG A1C LEVEL LT 7.0%: CPT | Performed by: INTERNAL MEDICINE

## 2022-09-12 PROCEDURE — 99214 OFFICE O/P EST MOD 30 MIN: CPT | Performed by: INTERNAL MEDICINE

## 2022-09-12 RX ORDER — PIOGLITAZONEHYDROCHLORIDE 30 MG/1
30 TABLET ORAL DAILY
Qty: 90 TABLET | Refills: 1 | Status: SHIPPED | OUTPATIENT
Start: 2022-09-12

## 2022-09-12 RX ORDER — METOPROLOL SUCCINATE 100 MG/1
100 TABLET, EXTENDED RELEASE ORAL DAILY
Qty: 90 TABLET | Refills: 1 | Status: SHIPPED | OUTPATIENT
Start: 2022-09-12

## 2022-09-12 ASSESSMENT — ENCOUNTER SYMPTOMS
TROUBLE SWALLOWING: 0
SINUS PRESSURE: 0
WHEEZING: 0
VOMITING: 0
EYE DISCHARGE: 0
COUGH: 0
NAUSEA: 0
RHINORRHEA: 0
CHEST TIGHTNESS: 0
SINUS PAIN: 0
ABDOMINAL PAIN: 0
BLOOD IN STOOL: 0
EYE PAIN: 0
SORE THROAT: 0

## 2022-09-12 ASSESSMENT — PATIENT HEALTH QUESTIONNAIRE - PHQ9
SUM OF ALL RESPONSES TO PHQ9 QUESTIONS 1 & 2: 0
SUM OF ALL RESPONSES TO PHQ QUESTIONS 1-9: 0
2. FEELING DOWN, DEPRESSED OR HOPELESS: 0
SUM OF ALL RESPONSES TO PHQ QUESTIONS 1-9: 0
1. LITTLE INTEREST OR PLEASURE IN DOING THINGS: 0
SUM OF ALL RESPONSES TO PHQ QUESTIONS 1-9: 0
SUM OF ALL RESPONSES TO PHQ QUESTIONS 1-9: 0

## 2022-09-12 NOTE — PATIENT INSTRUCTIONS
Fasting blood work    Keep follow-up with the urologist for elevated PSA and gastroenterologist for colonoscopy. hypertension    Extensive counseling done to keep low sodium diet and  Avoid potato chips, pretzels, sauerkraut , ham , sausage, meeks , salty crackers , salty french fries, salty nuts, salty popcorn etc.  Use only low sodium soups. No salted canned vegetables. Use fresh or frozen vegetables. No salt shaker use. May use Mrs. Lane as a salt substitute. Avoid weight gain. Regular exercise program.  Keep taking blood pressure medications regularly. If blood pressure staying above 130/85 or having side effects with blood pressure medications, then bring the blood pressure and pulse recorded twice a day to an appointment sooner than scheduled one. Extensive counseling done to keep diabetes in control to avoid kidney damage needing dialysis and avoid neuropathy needing toe amputations and avoid heart damage causing cardiomyopathy and congestive heart failure and avoid brain involvement including stroke and memory loss etc.    keep low carbohydrate diet. Breakfast : 4 egg white omelet or scrambled eggs with bell pepper,onion,tomato,spinach etc or boiled eggs for breakfast.     Lunch : Deck of card size meat (baked, broiled or grilled ) with leafy vegetables - spinach / kale / mustard green / lettuce etc. for salad. Supper : Otilia Aggarwal grilled meats -deck of cards sized with 3/4th dinner plate full of vegetables -green bean or broccoli or cauliflower or carrots. If needed , buy bread 35 to 40 kcal- two slices at a time only and Tortillas 50- 90 kcal only at one meal.    Least or no bread, potato, pasta, highly processed foods, fried foods, sweets etc.    Lose weight or keep healthy weight and avoid weight gain by SCHEDULED 3 - 4 miles Elliptical machine or brisk walk and Dumbbell 2-5 lb arm exercises- 4 group muscles -4 sets of 15-50 repetitions--4 days a week--AS TOLERATED.     Keep sugar record and bring it with every visit. Keep taking diabetes medications properly and let us know if you have made any diabetes medication changes. Try to keep fasting sugar less than 120 and 2 hours after meal sugar less than 150. keep Sugar in good control to avoid diabetes complications on kidneys , heart , eyes , nerves , brain etc.  Avoid low sugar with proper meals and 100-150 kcal snack. Keep yearly eye doctor follow up to monitor your diabetes effect on your eyes. Discussed use, benefit, and side effects of prescribed medications. Barriers to compliance discussed. All patient questions answered. Pt voiced understanding. IF YOU NEED A PRESCRIPTION REFILL, THEN PLEASE GIVE US THREE WORKING DAYS TO REFILL A PRESCRIPTION. May go to urgent care or Emergency room or call to be seen in the office sooner than scheduled follow-up appointment,if condition worsens. Please get all OVER DUE VACCINATIONS done at the pharmacy or health department as soon as possible.

## 2022-09-12 NOTE — PROGRESS NOTES
2022     Emelia Ruth (: 1960) is a 63 y.o. male, here for evaluation of the following medical concerns:    Chief Complaint   Patient presents with    Diabetes    Hypertension        --urologist follow-up for elevated PSA    Covid 19 again happened and caused fatigue and had mental fog initially and weakness and now he is recovering and he does not consider himself 100% yet and work is mentally challenging and difficult and thinking of changing the job if that continues otherwise fatigue wise we will check the blood work but he has not done the other blood work either for the kidneys liver etc. Part. Diabetes Mellitus     Home blood glucose log reviewed. Control is GOOD per home record. Fasting sugar in the morning is 123, 130 and 2 hours after meal has been 121 125 135 and bedtime has been in 120s also     He  has no symptoms of hypoglycemia. The patient denied polyphagia, polydipsia, or polyuria. The available labs reviewed and analyzed and independent interpretation of the results explained at length.   The last HBA1C and routine lab was Lab Results       Component                Value               Date                       LABA1C                   6.1                 2022            Lab Results       Component                Value               Date                       EAG                      128.4               2022            Lab Results       Component                Value               Date                       NA                       136                 2022                 K                        4.2                 2022                 CL                       99                  2022                 CREATININE               1.1                 2022                 BUN                      19                  2022                 CO2                      22                  2022                 LABMICR Not Indicated       04/30/2022              Has been taking meds as ordered. He has no side effects from the current diabetes medications. Congestive Heart Failure    Patient following up with history of congestive heart failure. Watching low-sodium diet. Blood pressure watch --mostly staying less than 130/85. Denies shortness of breath. Denies any side effects of medications. Hypertension    Watching low-sodium diet. Taking blood pressure medications regularly. Blood pressure checked off and on and trying to keep a goal of blood pressure less than 130/85 most of the time. Denies any chest pain / palpitation / shortness of breath / lightheadedness etc.   The available labs reviewed and analyzed and independent interpretation of the results explained at length. Lab Results       Component                Value               Date/Time                  NA                       136                 04/30/2022 08:09 AM        K                        4.2                 04/30/2022 08:09 AM        K                        4.2                 10/13/2021 02:40 PM        CL                       99                  04/30/2022 08:09 AM        CO2                      22                  04/30/2022 08:09 AM        BUN                      19                  04/30/2022 08:09 AM        CREATININE               1.1                 04/30/2022 08:09 AM        GLUCOSE                  145                 04/30/2022 08:09 AM        CALCIUM                  9.4                 04/30/2022 08:09 AM           Review of Systems   Constitutional:  Positive for fatigue. Negative for appetite change, chills, fever and unexpected weight change. HENT:  Negative for congestion, ear discharge, ear pain, nosebleeds, rhinorrhea, sinus pressure, sinus pain, sore throat and trouble swallowing. Eyes:  Negative for pain and discharge. Respiratory:  Negative for cough, chest tightness and wheezing. Cardiovascular:  Negative for leg swelling. Gastrointestinal:  Negative for abdominal pain, blood in stool, nausea and vomiting. Endocrine: Negative for polydipsia and polyphagia. Genitourinary:  Negative for difficulty urinating, enuresis, flank pain and hematuria. Musculoskeletal:  Negative for myalgias. Skin:  Negative for rash. Neurological:  Negative for facial asymmetry, weakness, light-headedness and numbness. Psychiatric/Behavioral:  Negative for confusion. Current Outpatient Medications on File Prior to Visit   Medication Sig Dispense Refill    blood glucose test strips (TRUE METRIX BLOOD GLUCOSE TEST) strip 1 each by In Vitro route 2 times daily As needed. 150 each 5    Blood Glucose Monitoring Suppl (TRUE METRIX AIR GLUCOSE METER) DANGELO Check sugar twice a day 1 each 0    Lancets MISC 1 each by Does not apply route 2 times daily 50 each 5    famotidine (PEPCID) 20 MG tablet Take 20 mg by mouth nightly as needed. No current facility-administered medications on file prior to visit.       Past Medical History:   Diagnosis Date    Acid reflux     Bradycardia     Cardiomyopathy (HCC)     CHB (complete heart block) (HCC)     CHF (congestive heart failure) ([address]     COVID-19     Hypertension     ICD (implantable cardioverter-defibrillator) battery depletion 5/11/16    Dual chamber [employer] - Dr. Allegra Coulter    Type 2 diabetes mellitus without complication, without long-term current use of insulin ([facility] 10/8/2021      Social History     Tobacco Use    Smoking status: Never    Smokeless tobacco: Never   Substance Use Topics    Alcohol use: No      Family History   Problem Relation Age of Onset    Diabetes Father     Arrhythmia Mother     Arthritis Mother         Vitals:    09/12/22 1451   BP: 126/70   Site: Left Upper Arm   Position: Sitting   Cuff Size: Large Adult   Pulse: 68   Temp: 96.9 °F (36.1 °C)   TempSrc: Temporal   SpO2: 97%   Weight: 210 lb (95.3 kg)   Height: 5' 10\" (1.778 m) dinner. Dispense: 360 tablet; Refill: 0  - empagliflozin (JARDIANCE) 25 MG tablet; Take 1 tablet by mouth daily  Dispense: 90 tablet; Refill: 0  - pioglitazone (ACTOS) 30 MG tablet; Take 1 tablet by mouth daily  Dispense: 90 tablet; Refill: 1    2. Primary hypertension    - metoprolol succinate (TOPROL XL) 100 MG extended release tablet; Take 1 tablet by mouth daily  Dispense: 90 tablet; Refill: 1    3. Other fatigue    - CBC with Auto Differential; Future  - T4, Free; Future  - TSH with Reflex; Future    Return in about 4 months (around 1/12/2023) for diabetes mellitus--Dr Jose Ortiz. Patient Instructions   Fasting blood work    Hypertension    Extensive counseling done to keep low sodium diet and  Avoid potato chips, pretzels, sauerkraut , ham , sausage, meeks , salty crackers , salty french fries, salty nuts, salty popcorn etc.  Use only low sodium soups. No salted canned vegetables. Use fresh or frozen vegetables. No salt shaker use. May use Mrs. Lane as a salt substitute. Avoid weight gain. Regular exercise program.  Keep taking blood pressure medications regularly. If blood pressure staying above 130/85 or having side effects with blood pressure medications, then bring the blood pressure and pulse recorded twice a day to an appointment sooner than scheduled one. Extensive counseling done to keep diabetes in control to avoid kidney damage needing dialysis and avoid neuropathy needing toe amputations and avoid heart damage causing cardiomyopathy and congestive heart failure and avoid brain involvement including stroke and memory loss etc.    keep low carbohydrate diet. Breakfast : 4 egg white omelet or scrambled eggs with bell pepper,onion,tomato,spinach etc or boiled eggs for breakfast.     Lunch : Deck of card size meat (baked, broiled or grilled ) with leafy vegetables - spinach / kale / mustard green / lettuce etc. for salad.     Supper : Omero Hernando grilled meats -deck of cards sized with 3/4th dinner plate full of vegetables -green bean or broccoli or cauliflower or carrots. If needed , buy bread 35 to 40 kcal- two slices at a time only and Tortillas 50- 90 kcal only at one meal.    Least or no bread, potato, pasta, highly processed foods, fried foods, sweets etc.    Lose weight or keep healthy weight and avoid weight gain by SCHEDULED 3 - 4 miles Elliptical machine or brisk walk and Dumbbell 2-5 lb arm exercises- 4 group muscles -4 sets of 15-50 repetitions--4 days a week--AS TOLERATED. Keep sugar record and bring it with every visit. Keep taking diabetes medications properly and let us know if you have made any diabetes medication changes. Try to keep fasting sugar less than 120 and 2 hours after meal sugar less than 150. keep Sugar in good control to avoid diabetes complications on kidneys , heart , eyes , nerves , brain etc.  Avoid low sugar with proper meals and 100-150 kcal snack. Keep yearly eye doctor follow up to monitor your diabetes effect on your eyes. Discussed use, benefit, and side effects of prescribed medications. Barriers to compliance discussed. All patient questions answered. Pt voiced understanding. IF YOU NEED A PRESCRIPTION REFILL, THEN PLEASE GIVE US THREE WORKING DAYS TO REFILL A PRESCRIPTION. May go to urgent care or Emergency room or call to be seen in the office sooner than scheduled follow-up appointment,if condition worsens. Please get all OVER DUE VACCINATIONS done at the pharmacy or health department as soon as possible. Electronically signed by Marcos Brothers MD on 9/12/2022 at 3:15 PM     This dictation was generated by voice recognition computer software. Although all attempts are made to edit the dictation for accuracy, there may be errors in the transcription that are not intended.

## 2022-09-16 NOTE — PROGRESS NOTES
[facility]  Advanced CHF/Pulmonary Hypertension   Cardiac Evaluation      Julissa Loza  YOB: 1960    Date of Visit: 9/20/22      Chief Complaint   Patient presents with    Follow-up    Congestive Heart Failure          History of Present Illness:  Julissa Loza is a 64 y.o. male who presents today for follow up for systolic congestive heart failure. He has a H/o HTN, idiopathic CHB, s/p dual chamber Medtronic PPM (5/2014). Drop in EF with 100%  and s/p CRT-D upgrade. NYHA class II. He had a sleep study in 12/2017 and was found to have severe sleep apnea but he thinks that his insurance does not cover the treatment. He had been taking care of and lives with his mother who had vaginal cancer, but she has passed away. Echo repeated 10/6/20 revealed EF 50-55%. Today, 9/20/2022, he says he is doing good. October 2021 he had COVID and lost 40 lbs. He has been on medication for type 2 diabetes since having COVID. He had COVID again recently. Patient is taking all cardiac medications as prescribed and tolerates them well. Patient denies current edema, chest pain, sob, palpitations, dizziness or syncope. Allergies   Allergen Reactions    Doxycycline     Lisinopril Other (See Comments)     dizziness    Losartan     Septra [Sulfamethoxazole-Trimethoprim] Other (See Comments)     Pt states he breaks out    Sulfa Antibiotics      \"Breaks Out\"    Tetracyclines & Related      \"Breaks Out\"     Current Outpatient Medications   Medication Sig Dispense Refill    metoprolol succinate (TOPROL XL) 100 MG extended release tablet Take 1 tablet by mouth daily 90 tablet 1    metFORMIN (GLUCOPHAGE) 500 MG tablet Take orally 1 tablet with breakfast and 1 with lunch and 2 with dinner.  (Patient taking differently: 1 tablet four times daily) 360 tablet 0    empagliflozin (JARDIANCE) 25 MG tablet Take 1 tablet by mouth daily 90 tablet 0    pioglitazone (ACTOS) 30 MG tablet Take 1 tablet by mouth daily 90 tablet 1    famotidine (PEPCID) 20 MG tablet Take 20 mg by mouth nightly as needed. blood glucose test strips (TRUE METRIX BLOOD GLUCOSE TEST) strip 1 each by In Vitro route 2 times daily As needed. 150 each 5    Blood Glucose Monitoring Suppl (TRUE METRIX AIR GLUCOSE METER) DANGELO Check sugar twice a day 1 each 0    Lancets MISC 1 each by Does not apply route 2 times daily 50 each 5     No current facility-administered medications for this visit.        Past Medical History:   Diagnosis Date    Acid reflux     Bradycardia     Cardiomyopathy (HCC)     CHB (complete heart block) (HCC)     CHF (congestive heart failure) ([facility]     COVID-19     Hypertension     ICD (implantable cardioverter-defibrillator) battery depletion 5/11/16    Dual chamber Medtronic - Dr. Mitzi Hollis    Type 2 diabetes mellitus without complication, without long-term current use of insulin ([facility] 10/8/2021     Past Surgical History:   Procedure Laterality Date    CARDIAC PACEMAKER PLACEMENT      COLONOSCOPY      Negative--not sure records--do another    COSMETIC SURGERY      PACEMAKER PLACEMENT      TONSILLECTOMY       Family History   Problem Relation Age of Onset    Diabetes Father     Arrhythmia Mother     Arthritis Mother      Social History     Socioeconomic History    Marital status: Single     Spouse name: Not on file    Number of children: Not on file    Years of education: Not on file    Highest education level: Not on file   Occupational History     Employer: [employer]     Comment: Corrects    Tobacco Use    Smoking status: Never    Smokeless tobacco: Never   Vaping Use    Vaping Use: Never used   Substance and Sexual Activity    Alcohol use: No    Drug use: No    Sexual activity: Not on file     Comment: Engaged   Other Topics Concern    Not on file   Social History Narrative    Not on file     Social Determinants of Health     Financial Resource Strain: Low Risk     Difficulty of Paying Living Expenses: Not hard at all Food Insecurity: No Food Insecurity    Worried About Running Out of Food in the Last Year: Never true    Ran Out of Food in the Last Year: Never true   Transportation Needs: Not on file   Physical Activity: Not on file   Stress: Not on file   Social Connections: Not on file   Intimate Partner Violence: Not on file   Housing Stability: Not on file       Review of Systems:   Constitutional: there has been no unanticipated weight loss. There's been no change in energy level, sleep pattern, or activity level. Eyes: No visual changes or diplopia. No scleral icterus. ENT: No Headaches, hearing loss or vertigo. No mouth sores or sore throat. Cardiovascular: Reviewed in HPI  Respiratory: No cough or wheezing, no sputum production. No hematemesis. Gastrointestinal: No abdominal pain, appetite loss, blood in stools. No change in bowel or bladder habits. Genitourinary: No dysuria, trouble voiding, or hematuria. Musculoskeletal:  No gait disturbance, weakness or joint complaints. Integumentary: No rash or pruritis. Neurological: No headache, diplopia, change in muscle strength, numbness or tingling. No change in gait, balance, coordination, mood, affect, memory, mentation, behavior. Psychiatric: No anxiety, no depression. Endocrine: No malaise, fatigue or temperature intolerance. No excessive thirst, fluid intake, or urination. No tremor. Hematologic/Lymphatic: No abnormal bruising or bleeding, blood clots or swollen lymph nodes. Allergic/Immunologic: No nasal congestion or hives. Physical Examination:    Vitals:    09/20/22 1412   BP: 126/72   Pulse: 70   SpO2: 98%   Weight: 216 lb (98 kg)   Height: 5' 10\" (1.778 m)       Body mass index is 30.99 kg/m².      Wt Readings from Last 3 Encounters:   09/20/22 216 lb (98 kg)   09/12/22 210 lb (95.3 kg)   05/10/22 206 lb 3.2 oz (93.5 kg)     BP Readings from Last 3 Encounters:   09/20/22 126/72   09/12/22 126/70   05/10/22 124/70     Constitutional and General Appearance:   WD/WN in NAD  HEENT:  NC/AT  CALVIN  No problems with hearing  Skin:  Warm, dry  Respiratory:  Normal excursion and expansion without use of accessory muscles  Resp Auscultation: Normal breath sounds without dullness  Cardiovascular: The apical impulses not displaced  Heart tones are crisp and normal  Cervical veins are not engorged  The carotid upstroke is normal in amplitude and contour without delay or bruit  JVP less than 8 cm H2O  RRR with nl S1 and S2 without m,r,g  Peripheral pulses are symmetrical and full  There is no clubbing, cyanosis of the extremities. No edema  Femoral Arteries: 2+ and equal  Pedal Pulses: 2+ and equal   Neck:  No thyromegaly  Abdomen:  No masses or tenderness  Liver/Spleen: No Abnormalities Noted  Neurological/Psychiatric:  Alert and oriented in all spheres  Moves all extremities well  Exhibits normal gait balance and coordination  No abnormalities of mood, affect, memory, mentation, or behavior are noted    Labs were reviewed including labs from other hospital systems through [facility]. Cardiac testing was reviewed including echos, nuclear scans, cardiac catheterization, including from other hospital systems through [facility]. Echo 10/6/20:   Normal left ventricular size. Mild concentric left ventricular hypertrophy. Left ventricular systolic function appears at the lower limits of normal   with an estimated ejection fraction of 50%-55%. Septal bounce likely due to pacing apparatus . Normal diastolic function. Normal right ventricular size with decreased systolic function. Pacer / ICD wire is visualized in the right ventricle. Aortic valve appears sclerotic but opens adequately. Trivial tricuspid regurgitation. Estimated pulmonary artery systolic pressure is at 20 mmHg assuming a right   atrial pressure of 3 mmHg    Cardiac MRI 2015  FINDINGS: The left ventricle is mildly dilated.  There is significant  wall motion abnormality of the left ventricle with akinesis of the  septum, anteroseptal wall, and apex and hypokinesis of the remaining  left ventricle. End-diastolic volume is 442 cc. End systolic volume is  213 cc. Left ventricular ejection fraction is 35%. -Size and wall motion of the right ventricle is normal. There is  susceptibility artifact within the right atrium and right ventricle  related to pacemaker leads. There is no pericardial effusion. The  mitral valve and aortic valve are unremarkable.  -On resting perfusion images, no resting perfusion defects are  identified.  -On delayed myocardial enhancement sequences, there is no delayed  myocardial enhancement to suggest myocardial fibrosis or infiltrative  Process. Pacer Placement-8/17/2016 The device is [id number] with SN# [id number] Medtronic implant date: 5/11/2016   Atrial lead: #8703-09 with serial number# [id number] implant date: 5/13/2014  RV lead: # [id number]-[id number] with serial number# [id number] implant date: 5/11/2016   LV lead: #2902-32 with serial number# [id number] implant date: 8/17/2016  Atrial lead: Impedance: 418 Pacing threshold: 0.75 @ 0.4 ms sensing 3.0 mV  RV lead: Impedance: 399 Pacing threshold: 1 @ 0.4 ms sensing, no sable underlying  LV lead: Impedence 931 Pacing threshold 1.25 @ 0.4 (L1-L4)  Device was programmed to DDD with lower rate of 60 and upper tracking rate of 130. Angio 2014: non obstructive    Echo 5/18  Left ventricular cavity size is normal. There is mild concentric left   ventricular hypertrophy. Overall left ventricular systolic function appears   low normal with an ejection fraction of 50-55%. No regional wall motion   abnormalities are noted other than septal bounce which may be related to   bundle branch block. Mild tricuspid regurgitation. Estimated pulmonary   artery systolic pressure is at 27 mmHg assuming a right atrial pressure of 3   mmHg. Echo 10/6/2020   Summary   Normal left ventricular size.    Mild concentric left ventricular hypertrophy. Left ventricular systolic function appears at the lower limits of normal   with an estimated ejection fraction of 50%-55%. Septal bounce likely due to pacing apparatus . Normal diastolic function. Normal right ventricular size with decreased systolic function. Pacer / ICD wire is visualized in the right ventricle. Aortic valve appears sclerotic but opens adequately. Trivial tricuspid regurgitation. Estimated pulmonary artery systolic pressure is at 20 mmHg assuming a right   atrial pressure of 3 mmHg. Labs were reviewed including labs from other hospital systems through [facility]. Cardiac testing was reviewed including echos, nuclear scans, cardiac catheterization, including from other hospital systems through [facility]. Assessment:    1. Chronic systolic heart failure ([facility]    2. Biventricular automatic implantable cardioverter defibrillator in situ    3. Nonischemic cardiomyopathy ([facility]    4. SOB (shortness of breath)           1. BiV ICD in situ->     2. IESHA->Sleep study completed 12/13/2017, positive for severe sleep apnea     3. AVB->Bi-V ICD in place      4. NICMP-> Compensated. Clinically improved with CRT-D, now solid NYHA II  MRI with no ischemia, fibrosis or infiltrative disease, has wall motion abnormalities    5. HTN: BP stable today          Plan:  1. Continue current medications  2. Discussed finding a new primary care. Located within [facility] [phone]  7. Echocardiogram before next visit to assess heart function. I will call you with results  4. Follow up with me in 9 months      QUALITY MEASURES  1. Tobacco Cessation Counseling: N/A  2. Retake of BP if >140/90:   N/A  3. Documentation to PCP/referring for new patient:  Sent to PCP at close of office visit  4. CAD patient on anti-platelet: N/A  5. CAD patient on STATIN therapy: N/A  6.  Patient with CHF and aFib on anticoagulation:  N/A    This note was scribed in the presence of Rosario Mc MD by Tex Rodriguez RN      Impedance monitoring via [address] was downloaded from patient's ICD and reviewed. The impedance shows stable fluid level. The scribe's documentation has been prepared under my direction and personally reviewed by me in its entirety. I confirm that the note above accurately reflects all work, treatment, procedures, and medical decision making performed by me. Time Based Itemization  A total of 40 minutes was spent on today's patient encounter. If applicable, non-patient-facing activities:  ( x)Preparing to see the patient and reviewing records  ( ) Individual interpretation of results  ( ) Discussion or coordination of care with other health care professionals  ( x) Ordering of unique tests, medications, or procedures  ( x) Documentation within the EHR          I appreciate the opportunity of cooperating in the care of this patient.     Sierra Cesar M.D., [facility]

## 2022-09-20 ENCOUNTER — NURSE ONLY (OUTPATIENT)
Dept: CARDIOLOGY CLINIC | Age: 62
End: 2022-09-20
Payer: COMMERCIAL

## 2022-09-20 ENCOUNTER — OFFICE VISIT (OUTPATIENT)
Dept: CARDIOLOGY CLINIC | Age: 62
End: 2022-09-20
Payer: COMMERCIAL

## 2022-09-20 VITALS
HEIGHT: 70 IN | SYSTOLIC BLOOD PRESSURE: 126 MMHG | BODY MASS INDEX: 30.92 KG/M2 | DIASTOLIC BLOOD PRESSURE: 72 MMHG | WEIGHT: 216 LBS | HEART RATE: 70 BPM | OXYGEN SATURATION: 98 %

## 2022-09-20 DIAGNOSIS — I44.2 CHB (COMPLETE HEART BLOCK) (HCC): Chronic | ICD-10-CM

## 2022-09-20 DIAGNOSIS — I50.22 CHRONIC SYSTOLIC HEART FAILURE (HCC): Chronic | ICD-10-CM

## 2022-09-20 DIAGNOSIS — I42.9 CARDIOMYOPATHY, UNSPECIFIED TYPE (HCC): Chronic | ICD-10-CM

## 2022-09-20 DIAGNOSIS — R06.02 SOB (SHORTNESS OF BREATH): ICD-10-CM

## 2022-09-20 DIAGNOSIS — I42.8 NONISCHEMIC CARDIOMYOPATHY (HCC): ICD-10-CM

## 2022-09-20 DIAGNOSIS — Z95.810 BIVENTRICULAR AUTOMATIC IMPLANTABLE CARDIOVERTER DEFIBRILLATOR IN SITU: Chronic | ICD-10-CM

## 2022-09-20 DIAGNOSIS — I50.22 CHRONIC SYSTOLIC HEART FAILURE (HCC): Primary | Chronic | ICD-10-CM

## 2022-09-20 PROCEDURE — 99215 OFFICE O/P EST HI 40 MIN: CPT | Performed by: INTERNAL MEDICINE

## 2022-09-20 PROCEDURE — 93290 INTERROG DEV EVAL ICPMS IP: CPT | Performed by: INTERNAL MEDICINE

## 2022-09-20 NOTE — PROGRESS NOTES
[facility]   Electrophysiology  Office Visit  Date: 9/28/2022    Chief Complaint   Patient presents with    Bradycardia    Cardiomyopathy    Congestive Heart Failure    Tachycardia       Cardiac HX: Darryle Shells is a 63 y.o. man with a h/o HTN, high degree AV block, s/p dual ch MDT PPM (5/2014, Dr. Wendy Wilson), EF had been 55%, 100%  burden, developed NICMP, s/p upgrade to an ICD (5/11/2016), unable to place LV lead d/t inability to cannulate the CS, reattempt with an upgrade to a CRT device (8/17/2016, Dr. Wendy Wilson), increased PVCs and NSVT noted on device. Interval History/HPI: Patient is here to follow-up for NICMP, PVCs and ICD management. Patient has a longstanding history of high degree AV block for which she was implanted with a dual-chamber MDT PPM in 2014. His EF was originally 55%. He did have 100% pacing burden in the ventricle and developed a nonischemic cardiomyopathy. He underwent an attempted upgrade to a CRT-D in May 2016 however the CS was unable to be cannulated and he was implanted with a dual-chamber ICD. He underwent a second attempt to an upgrade to a CRT-D on 8/17/2016. His EF improved to 50 to 55%. Review of his device today shows that he atrially paces 2.3% of the time and ventricularly paces 99.7% of the time. He has had no arrhythmias on his device. He is dependent at VVI 35. Other parameters stable. He has an estimated longevity of 18 months left on his device. Review of his medications show that he is no longer on valsartan. He thinks that he had been on it, had gone to the emergency room and it was off of it when he left the emergency room. He had recently seen Dr. Kishore Liu and it was not mentioned that he should go back on it. He denies any chest pain, shortness of breath, PND, orthopnea or lower extremity edema. He is under considerable amount of stress at his work. He has no dizziness, lightheadedness or syncopal events.   His blood pressure is well controlled in the office today. Home medications:   Current Outpatient Medications on File Prior to Visit   Medication Sig Dispense Refill    metoprolol succinate (TOPROL XL) 100 MG extended release tablet Take 1 tablet by mouth daily 90 tablet 1    metFORMIN (GLUCOPHAGE) 500 MG tablet Take orally 1 tablet with breakfast and 1 with lunch and 2 with dinner. (Patient taking differently: 1 tablet four times daily) 360 tablet 0    empagliflozin (JARDIANCE) 25 MG tablet Take 1 tablet by mouth daily 90 tablet 0    pioglitazone (ACTOS) 30 MG tablet Take 1 tablet by mouth daily 90 tablet 1    blood glucose test strips (TRUE METRIX BLOOD GLUCOSE TEST) strip 1 each by In Vitro route 2 times daily As needed. 150 each 5    Blood Glucose Monitoring Suppl (TRUE METRIX AIR GLUCOSE METER) DANGELO Check sugar twice a day 1 each 0    Lancets MISC 1 each by Does not apply route 2 times daily 50 each 5    famotidine (PEPCID) 20 MG tablet Take 20 mg by mouth nightly as needed. No current facility-administered medications on file prior to visit.        Past Medical History:   Diagnosis Date    Acid reflux     Bradycardia     Cardiomyopathy ([facility]     CHB (complete heart block) (HCC)     CHF (congestive heart failure) ([facility]     COVID-19     Hypertension     ICD (implantable cardioverter-defibrillator) battery depletion 5/11/16    Dual chamber Medtronic - Dr. Emiliana Duenas    Type 2 diabetes mellitus without complication, without long-term current use of insulin ([facility] 10/8/2021        Past Surgical History:   Procedure Laterality Date    CARDIAC PACEMAKER PLACEMENT      COLONOSCOPY      Negative--not sure records--do another    COSMETIC SURGERY      PACEMAKER PLACEMENT      TONSILLECTOMY         Allergies   Allergen Reactions    Doxycycline     Lisinopril Other (See Comments)     dizziness    Losartan     Septra [Sulfamethoxazole-Trimethoprim] Other (See Comments)     Pt states he breaks out    Sulfa Antibiotics      \"Breaks Out\" Tetracyclines & Related      \"Breaks Out\"       Social History:  Reviewed. reports that he has never smoked. He has never used smokeless tobacco. He reports that he does not drink alcohol and does not use drugs. Family History:  Reviewed. family history includes Arrhythmia in his mother; Arthritis in his mother; Diabetes in his father. Review of System:    Constitutional: No fevers, chills. Eyes: No visual changes or diplopia. No scleral icterus. ENT: No Headaches. No mouth sores or sore throat. Cardiovascular: No for chest pain, No for dyspnea on exertion, No for palpitations or No for loss of consciousness. No cough, hemoptysis, No for pleuritic pain, or phlebitis. Respiratory: No for cough or wheezing. No hematemesis. Gastrointestinal: No abdominal pain, blood in stools. Genitourinary: No dysuria, or hematuria. Musculoskeletal: No gait disturbance,    Integumentary: No rash or pruritis. Neurological: No headache, change in muscle strength, numbness or tingling. Psychiatric: No anxiety, or depression. Endocrine: No temperature intolerance. No excessive thirst, fluid intake, or urination. Hem/Lymph: No abnormal bruising or bleeding, blood clots or swollen lymph nodes. Allergic/Immunologic: No nasal congestion or hives. Physical Examination:  Vitals:    09/28/22 1454   BP: 112/72   Pulse: 74           Wt Readings from Last 3 Encounters:   09/28/22 218 lb 3.2 oz (99 kg)   09/20/22 216 lb (98 kg)   09/12/22 210 lb (95.3 kg)       Constitutional: Oriented. No distress. Head: Normocephalic and atraumatic. Mouth/Throat: Oropharynx is clear and moist.   Eyes: Conjunctivae clear without jaunduice. PERRL. Neck: Neck supple. No rigidity. No JVD present. Cardiovascular: Normal rate, regular rhythm, S1&S2. Pulmonary/Chest: Bilateral respiratory sounds. No wheezes, No rhonchi. Abdominal: Soft. Bowel sounds present. No distension, No tenderness. Musculoskeletal: No tenderness.  No edema    Lymphadenopathy: Has no cervical adenopathy. Neurological: Alert and oriented. Cranial nerve appears intact, No Gross deficit   Skin: Skin is warm and dry. No rash noted. Psychiatric: Has a normal mood, affect and behavior     Labs:  Reviewed. No results for input(s): NA, K, CL, CO2, PHOS, BUN, CREATININE, CA in the last 72 hours. Invalid input(s):  TSH  No results for input(s): WBC, HGB, HCT, MCV, PLT in the last 72 hours. Lab Results   Component Value Date/Time    CKTOTAL 47 10/12/2021 01:21 PM    TROPONINI 0.02 05/27/2014 03:29 AM     Lab Results   Component Value Date/Time    .0 05/27/2014 03:29 AM    .0 05/12/2014 11:25 AM     Lab Results   Component Value Date/Time    PROTIME 11.5 08/11/2016 11:19 AM    PROTIME 10.8 05/06/2016 08:49 AM    PROTIME 11.2 05/27/2014 03:20 AM    INR 1.01 08/11/2016 11:19 AM    INR 0.95 05/06/2016 08:49 AM    INR 1.04 05/27/2014 03:20 AM     Lab Results   Component Value Date/Time    CHOL 136 09/17/2022 08:10 AM    HDL 43 09/17/2022 08:10 AM    TRIG 101 09/17/2022 08:10 AM       ECG: Personally reviewed: AS, , HR 73, , , QTc 446    ECHO: 10.6.2020   Summary   Normal left ventricular size. Mild concentric left ventricular hypertrophy. Left ventricular systolic function appears at the lower limits of normal   with an estimated ejection fraction of 50%-55%. Septal bounce likely due to pacing apparatus . Normal diastolic function. Normal right ventricular size with decreased systolic function. Pacer / ICD wire is visualized in the right ventricle. Aortic valve appears sclerotic but opens adequately. Trivial tricuspid regurgitation. Estimated pulmonary artery systolic pressure is at 20 mmHg assuming a right   atrial pressure of 3 mmHg. Stress Test: n/a    Cardiac Angiography: 2014 Dr. Kendy Partida  Angiographic Findings:  Right dominant system  Left Main:  Absent. 2 separate ostia for the LAD and Lcx.    Left Anterior Descending:  No angiographic obstructive disease. Circumflex:  No angiographic obstructive disease. Right Coronary:  No angiographic obstructive disease. Left Ventriculogram:  Not performed; had echo. Hemodynamics (mm Hg):  Left Ventricular Pressure:  203/5, 25  Central Aortic Pressure:  200/75   Conclusions:  No significant CAD   Recommendations:  -Pacemaker/Heart block management per Dr. Leighann Minor    Problem List:   Patient Active Problem List    Diagnosis Date Noted    Elevated PSA 05/10/2022    Biventricular automatic implantable cardioverter defibrillator in situ 06/06/2017    Obesity (BMI 30.0-34.9) 08/24/2016    Cardiomyopathy ([address]     Chronic systolic heart failure ([address]     Primary hypertension 05/27/2014    CHB (complete heart block) ([address] 05/14/2014    Bradycardia 05/12/2014    SOB (shortness of breath) 05/12/2014    Other fatigue 11/10/2021    Hyponatremia     Type 2 diabetes mellitus without complication, without long-term current use of insulin ([address] 10/08/2021    Boil of lower extremity 10/08/2021    Myalgia 10/08/2021    Obstructive sleep apnea syndrome         Assessment:   1. Bradycardia    2. SOB (shortness of breath)    3. Chronic systolic heart failure ([address]    4. CHB (complete heart block) (HCC)    5. Biventricular automatic implantable cardioverter defibrillator in situ        Cardiac HX: Jesse Danielson is a 63 y.o. man with a h/o HTN, CHB, s/p dual ch MDT PPM (5/2014, Dr. Leighann Minor), EF originally 55%, 100%  burden, developed NICMP, EF 15-20% (3/2016), placed on GDMT, s/p upgrade to a dual ch MDT ICD (5/11/2016, Dr. Leighann Minor), unable to place LV lead d/t inability to cannulate the CS, reattempt with an upgrade to a CRT-D (8/17/2016, Dr. Leighann Mionr), increased PVCs and NSVT noted on device.     NICMP/chronic sCHF/CHB  - Appears euvolemic  - EF 50-55%  - NYHA class I  -  paced  - On Toprol- mg daily  - Had been taken off valsartan during an ED visit  - Will restart valsartan

## 2022-09-20 NOTE — PROGRESS NOTES
DEBI from office visit with JOSTIN for [facility] check. Patient has a history of bradycardia, CM, and CHB. Takes Toprol XL. Last Optivol check 7/12. TI near baseline. Device measurements within normal limits. See Paceart report under the Cardiology tab.

## 2022-09-20 NOTE — PATIENT INSTRUCTIONS
Your provider has ordered testing for further evaluation. An order/prescription has been included in your paper work. To schedule outpatient testing, contact Central Scheduling by calling [address] [place] [address] ([phone]). Plan:  1. Continue current medications  2. Discussed finding a new primary care. Ruthie Andrade [phone]  7. Echocardiogram before next visit to assess heart function. I will call you with results  4.  Follow up with me in 9 months

## 2022-09-26 ENCOUNTER — TELEPHONE (OUTPATIENT)
Dept: CARDIOLOGY CLINIC | Age: 62
End: 2022-09-26

## 2022-09-28 ENCOUNTER — NURSE ONLY (OUTPATIENT)
Dept: CARDIOLOGY CLINIC | Age: 62
End: 2022-09-28
Payer: COMMERCIAL

## 2022-09-28 ENCOUNTER — OFFICE VISIT (OUTPATIENT)
Dept: CARDIOLOGY CLINIC | Age: 62
End: 2022-09-28
Payer: COMMERCIAL

## 2022-09-28 VITALS
DIASTOLIC BLOOD PRESSURE: 72 MMHG | BODY MASS INDEX: 31.31 KG/M2 | SYSTOLIC BLOOD PRESSURE: 112 MMHG | WEIGHT: 218.2 LBS | HEART RATE: 74 BPM

## 2022-09-28 DIAGNOSIS — R00.1 BRADYCARDIA: Primary | ICD-10-CM

## 2022-09-28 DIAGNOSIS — I49.3 PVC'S (PREMATURE VENTRICULAR CONTRACTIONS): ICD-10-CM

## 2022-09-28 DIAGNOSIS — I44.2 CHB (COMPLETE HEART BLOCK) (HCC): Chronic | ICD-10-CM

## 2022-09-28 DIAGNOSIS — Z95.810 BIVENTRICULAR AUTOMATIC IMPLANTABLE CARDIOVERTER DEFIBRILLATOR IN SITU: ICD-10-CM

## 2022-09-28 DIAGNOSIS — Z95.810 BIVENTRICULAR AUTOMATIC IMPLANTABLE CARDIOVERTER DEFIBRILLATOR IN SITU: Primary | Chronic | ICD-10-CM

## 2022-09-28 DIAGNOSIS — R06.02 SOB (SHORTNESS OF BREATH): ICD-10-CM

## 2022-09-28 DIAGNOSIS — I50.22 CHRONIC SYSTOLIC HEART FAILURE (HCC): Chronic | ICD-10-CM

## 2022-09-28 DIAGNOSIS — I44.2 CHB (COMPLETE HEART BLOCK) (HCC): ICD-10-CM

## 2022-09-28 DIAGNOSIS — I50.22 CHRONIC SYSTOLIC HEART FAILURE (HCC): ICD-10-CM

## 2022-09-28 DIAGNOSIS — I42.8 NICM (NONISCHEMIC CARDIOMYOPATHY) (HCC): ICD-10-CM

## 2022-09-28 DIAGNOSIS — R00.1 BRADYCARDIA: Chronic | ICD-10-CM

## 2022-09-28 PROCEDURE — 99215 OFFICE O/P EST HI 40 MIN: CPT | Performed by: NURSE PRACTITIONER

## 2022-09-28 PROCEDURE — 93284 PRGRMG EVAL IMPLANTABLE DFB: CPT | Performed by: INTERNAL MEDICINE

## 2022-09-28 RX ORDER — VALSARTAN 80 MG/1
80 TABLET ORAL DAILY
Qty: 90 TABLET | Refills: 1 | Status: SHIPPED | OUTPATIENT
Start: 2022-09-28

## 2022-09-28 NOTE — PROGRESS NOTES
Patient comes in for programming evaluation on their Medtronic CRTD. Last remote 9.20.2022-(DEBI for optivol)    All sensing and pacing parameters are within normal range. Battery life 18 months    Underlying V dependent VVI 35 bpm  AP 2.4%.  99.7%. BVP 99.9%  AT/AF burden 0%  PVC 9.8/hr  0 treated/monitored/VS episodes noted since 9.20.2022  Patient remains on metoprolol. Turned on additional carelink alerts. LV output adjusted to 2.25V after testing. Please see interrogation for more detail. Optivol is within normal range. Patient will see NPFW today and follow up in 3 months in office or remotely.

## 2022-09-28 NOTE — Clinical Note
Jaylen Rasmussen. Francisco Plascencia would like us to move patient's remote back 3 months. Thank you.

## 2022-09-29 ENCOUNTER — TELEPHONE (OUTPATIENT)
Dept: CARDIOLOGY CLINIC | Age: 62
End: 2022-09-29

## 2022-09-29 NOTE — TELEPHONE ENCOUNTER
9/29 Pt called I to find out about paperwork that was turned into [employer] for work. Please advise. Also, pt had a question about a new medicine that was prescribed.

## 2022-09-30 NOTE — TELEPHONE ENCOUNTER
Pt stated that the paperwork was submitted to not his local company. Pt needs hard copies of the paperwork. Pt will come into the office to . Please advise.

## 2022-10-05 LAB
P2PSA: 55.6 PG/ML
PHI-HYB: 134
PROSTATE SPECIFIC ANTIGEN FREE: 1.27 NG/ML
PROSTATE SPECIFIC ANTIGEN PERCENT FREE: 13.5 %
PSA, TOTAL: 9.4 NG/ML (ref 0–4)

## 2022-12-10 ENCOUNTER — NURSE ONLY (OUTPATIENT)
Dept: CARDIOLOGY CLINIC | Age: 62
End: 2022-12-10

## 2022-12-12 NOTE — PROGRESS NOTES
Manual remote transmission received from patient's CRT-D monitor at home; appears to be initial set up for new monitor/reader. Transmission shows normal sensing and pacing function. DEANDRE in 16months. No new arrhythmias/events recorded. Ap 2.7%  BiVp 99.7%   PVCs 8.8/hr    Optivol is within normal range. TriageHF Heart Failure Risk Status on 10-Dec-2022 is Medium*. EP physician will review. See interrogation under cardiology tab in the [address] [place] [address] [address] field for more details. Will continue to monitor remotely.

## 2023-01-09 ENCOUNTER — HOSPITAL ENCOUNTER (OUTPATIENT)
Dept: MRI IMAGING | Age: 63
Discharge: HOME | End: 2023-01-09
Payer: COMMERCIAL

## 2023-01-09 VITALS — OXYGEN SATURATION: 95 % | HEART RATE: 74 BPM

## 2023-01-09 DIAGNOSIS — E11.9 DIABETES MELLITUS WITHOUT COMPLICATION (HCC): ICD-10-CM

## 2023-01-09 DIAGNOSIS — I10 ESSENTIAL (PRIMARY) HYPERTENSION: ICD-10-CM

## 2023-01-09 DIAGNOSIS — N40.0 ENLARGED PROSTATE WITHOUT LOWER URINARY TRACT SYMPTOMS (LUTS): ICD-10-CM

## 2023-01-09 DIAGNOSIS — R97.20 ELEVATED PROSTATE SPECIFIC ANTIGEN (PSA): ICD-10-CM

## 2023-01-09 PROCEDURE — 6360000004 HC RX CONTRAST MEDICATION: Performed by: UROLOGY

## 2023-01-09 PROCEDURE — A9576 INJ PROHANCE MULTIPACK: HCPCS | Performed by: UROLOGY

## 2023-01-09 PROCEDURE — 72197 MRI PELVIS W/O & W/DYE: CPT

## 2023-01-09 RX ADMIN — GADOTERIDOL 20 ML: 279.3 INJECTION, SOLUTION INTRAVENOUS at 15:54

## 2023-02-14 ENCOUNTER — TELEPHONE (OUTPATIENT)
Dept: INTERNAL MEDICINE CLINIC | Age: 63
End: 2023-02-14

## 2023-02-14 DIAGNOSIS — E11.9 TYPE 2 DIABETES MELLITUS WITHOUT COMPLICATION, WITHOUT LONG-TERM CURRENT USE OF INSULIN (HCC): ICD-10-CM

## 2023-02-16 DIAGNOSIS — E11.9 TYPE 2 DIABETES MELLITUS WITHOUT COMPLICATION, WITHOUT LONG-TERM CURRENT USE OF INSULIN (HCC): ICD-10-CM

## 2023-02-16 RX ORDER — PIOGLITAZONEHYDROCHLORIDE 30 MG/1
30 TABLET ORAL DAILY
Qty: 90 TABLET | Refills: 1 | Status: CANCELLED | OUTPATIENT
Start: 2023-02-16

## 2023-03-02 ENCOUNTER — NURSE ONLY (OUTPATIENT)
Dept: CARDIOLOGY CLINIC | Age: 63
End: 2023-03-02

## 2023-03-02 DIAGNOSIS — I44.2 CHB (COMPLETE HEART BLOCK) (HCC): Chronic | ICD-10-CM

## 2023-03-02 DIAGNOSIS — I42.8 NONISCHEMIC CARDIOMYOPATHY (HCC): ICD-10-CM

## 2023-03-02 DIAGNOSIS — I50.22 CHRONIC SYSTOLIC HEART FAILURE (HCC): Chronic | ICD-10-CM

## 2023-03-02 DIAGNOSIS — Z95.810 BIVENTRICULAR AUTOMATIC IMPLANTABLE CARDIOVERTER DEFIBRILLATOR IN SITU: Primary | Chronic | ICD-10-CM

## 2023-03-03 NOTE — RESULT ENCOUNTER NOTE
Unremarkable device check.    Continue to monitor    Сергей Campo MD   Cardiac Electrophysiology  [address] [facility]  Office [phone]

## 2023-03-03 NOTE — PROGRESS NOTES
Remote transmission received from patient's CRT-D monitor at home. Transmission shows normal sensing and pacing function. RRT/DEANDRE estimated in 14 months. No new arrhythmias/events recorded. MRI SureScan noted 01.09.23. EP physician will review. Ap 6.6%  BiVp 99.8%, VSRp <0.1%  PVCs 9.8/hr    Optivol is within normal range. TriageHF Heart Failure Risk Status on 02-Mar-2023 is Medium*. JOSTIN will review. End of 91-day monitoring period 3/2/23. See interrogation under cardiology tab in the [address] [place] [address] [address] field for more details. Will continue to monitor remotely.

## 2023-03-29 DIAGNOSIS — I10 PRIMARY HYPERTENSION: ICD-10-CM

## 2023-03-29 RX ORDER — METOPROLOL SUCCINATE 100 MG/1
100 TABLET, EXTENDED RELEASE ORAL DAILY
Qty: 90 TABLET | Refills: 1 | Status: SHIPPED | OUTPATIENT
Start: 2023-03-29

## 2023-05-01 ENCOUNTER — TELEPHONE (OUTPATIENT)
Dept: INTERNAL MEDICINE CLINIC | Age: 63
End: 2023-05-01

## 2023-05-11 ENCOUNTER — OFFICE VISIT (OUTPATIENT)
Dept: CARDIOLOGY CLINIC | Age: 63
End: 2023-05-11
Payer: COMMERCIAL

## 2023-05-11 ENCOUNTER — NURSE ONLY (OUTPATIENT)
Dept: CARDIOLOGY CLINIC | Age: 63
End: 2023-05-11
Payer: COMMERCIAL

## 2023-05-11 VITALS
DIASTOLIC BLOOD PRESSURE: 80 MMHG | SYSTOLIC BLOOD PRESSURE: 120 MMHG | WEIGHT: 237 LBS | HEART RATE: 66 BPM | BODY MASS INDEX: 34.01 KG/M2

## 2023-05-11 DIAGNOSIS — I44.2 CHB (COMPLETE HEART BLOCK) (HCC): Chronic | ICD-10-CM

## 2023-05-11 DIAGNOSIS — I10 PRIMARY HYPERTENSION: ICD-10-CM

## 2023-05-11 DIAGNOSIS — R00.1 BRADYCARDIA: Chronic | ICD-10-CM

## 2023-05-11 DIAGNOSIS — I42.9 CARDIOMYOPATHY, UNSPECIFIED TYPE (HCC): Primary | Chronic | ICD-10-CM

## 2023-05-11 DIAGNOSIS — I42.9 CARDIOMYOPATHY, UNSPECIFIED TYPE (HCC): Chronic | ICD-10-CM

## 2023-05-11 DIAGNOSIS — Z95.810 BIVENTRICULAR AUTOMATIC IMPLANTABLE CARDIOVERTER DEFIBRILLATOR IN SITU: Primary | Chronic | ICD-10-CM

## 2023-05-11 DIAGNOSIS — I50.22 CHRONIC SYSTOLIC HEART FAILURE (HCC): Chronic | ICD-10-CM

## 2023-05-11 PROCEDURE — 3078F DIAST BP <80 MM HG: CPT | Performed by: INTERNAL MEDICINE

## 2023-05-11 PROCEDURE — 99214 OFFICE O/P EST MOD 30 MIN: CPT | Performed by: INTERNAL MEDICINE

## 2023-05-11 PROCEDURE — 3074F SYST BP LT 130 MM HG: CPT | Performed by: INTERNAL MEDICINE

## 2023-05-11 PROCEDURE — 93290 INTERROG DEV EVAL ICPMS IP: CPT | Performed by: INTERNAL MEDICINE

## 2023-05-11 PROCEDURE — 93284 PRGRMG EVAL IMPLANTABLE DFB: CPT | Performed by: INTERNAL MEDICINE

## 2023-05-11 RX ORDER — METOPROLOL SUCCINATE 100 MG/1
100 TABLET, EXTENDED RELEASE ORAL DAILY
Qty: 90 TABLET | Refills: 3 | Status: SHIPPED | OUTPATIENT
Start: 2023-05-11

## 2023-05-11 NOTE — PROGRESS NOTES
Patient comes in for programming evaluation on their Medtronic CRTD. Last remote 03.02.2023 (optivol)    All sensing and pacing parameters appear unchanged since last in office check on 09.28.2022.  11 Months estimated until DEANDRE/RRT. Underlying V dependent VVI 30 bpm  AP 10.6%.  99.8%. PVC 8.3/hr  1 VS episode on 3.19.2023 x 09 secs w/available EGM illustrating PVCs, possible slow NSVT. Patient remains on metoprolol. Time updated. Please see interrogation for more detail. Optivol is within normal range. Patient will see Dr. Helen Wyatt today. We will continue to monitor remotely.

## 2023-10-12 ENCOUNTER — TELEPHONE (OUTPATIENT)
Dept: CARDIOLOGY CLINIC | Age: 63
End: 2023-10-12

## 2023-10-12 NOTE — TELEPHONE ENCOUNTER
Patient calling requesting refill for 90 day supply of  metoprolol succinate (TOPROL XL) 100 MG extended release tablet to be sent to [facility] on [address]  Last OV 5.11.23 next OV 11.14.23 labs done 9.17.22

## 2023-10-13 DIAGNOSIS — I10 PRIMARY HYPERTENSION: ICD-10-CM

## 2023-10-13 NOTE — TELEPHONE ENCOUNTER
Requested Prescriptions     Pending Prescriptions Disp Refills    metoprolol succinate (TOPROL XL) 100 MG extended release tablet 90 tablet 3     Sig: Take 1 tablet by mouth daily       Last Office Visit: 5/11/2023     Next Office Visit: 11/14/2023

## 2023-10-16 RX ORDER — METOPROLOL SUCCINATE 100 MG/1
100 TABLET, EXTENDED RELEASE ORAL DAILY
Qty: 90 TABLET | Refills: 3 | Status: SHIPPED | OUTPATIENT
Start: 2023-10-16

## 2023-11-10 NOTE — PROGRESS NOTES
(218 lb 3.2 oz)       Constitutional: Oriented. No distress. Head: Normocephalic and atraumatic. Mouth/Throat: Oropharynx is clear and moist.   Eyes: Conjunctivae clear without jaunduice. PERRL. Neck: Neck supple. No rigidity. No JVD present. Cardiovascular: Normal rate, regular rhythm, S1&S2. Pulmonary/Chest: Bilateral respiratory sounds. No wheezes, No rhonchi. Abdominal: Soft. Bowel sounds present. No distension, No tenderness. Musculoskeletal: No tenderness. No edema    Lymphadenopathy: Has no cervical adenopathy. Neurological: Alert and oriented. Cranial nerve appears intact, No Gross deficit   Skin: Skin is warm and dry. No rash noted. Psychiatric: Has a normal mood, affect and behavior     Labs:  Reviewed. No results for input(s): \"NA\", \"K\", \"CL\", \"CO2\", \"PHOS\", \"BUN\", \"CREATININE\", \"CA\", \"TSH\" in the last 72 hours. No results for input(s): \"WBC\", \"HGB\", \"HCT\", \"MCV\", \"PLT\" in the last 72 hours. Lab Results   Component Value Date/Time    CKTOTAL 47 10/12/2021 01:21 PM    TROPONINI 0.02 05/27/2014 03:29 AM     Lab Results   Component Value Date/Time    .0 05/27/2014 03:29 AM    .0 05/12/2014 11:25 AM     Lab Results   Component Value Date/Time    PROTIME 11.5 08/11/2016 11:19 AM    PROTIME 10.8 05/06/2016 08:49 AM    PROTIME 11.2 05/27/2014 03:20 AM    INR 1.01 08/11/2016 11:19 AM    INR 0.95 05/06/2016 08:49 AM    INR 1.04 05/27/2014 03:20 AM     Lab Results   Component Value Date/Time    CHOL 136 09/17/2022 08:10 AM    HDL 43 09/17/2022 08:10 AM    TRIG 101 09/17/2022 08:10 AM       ECG: Personally reviewed: A sensed, V paced, HR 67, , QTc 456    ECHO: 10.6.2020  Summary  Normal left ventricular size. Mild concentric left ventricular hypertrophy. Left ventricular systolic function appears at the lower limits of normal with an estimated ejection fraction of 50%-55%. Septal bounce likely due to pacing apparatus . Normal diastolic function.  Normal right ventricular

## 2023-11-14 ENCOUNTER — NURSE ONLY (OUTPATIENT)
Dept: CARDIOLOGY CLINIC | Age: 63
End: 2023-11-14

## 2023-11-14 ENCOUNTER — OFFICE VISIT (OUTPATIENT)
Dept: CARDIOLOGY CLINIC | Age: 63
End: 2023-11-14

## 2023-11-14 VITALS
HEART RATE: 69 BPM | SYSTOLIC BLOOD PRESSURE: 118 MMHG | DIASTOLIC BLOOD PRESSURE: 70 MMHG | WEIGHT: 233 LBS | BODY MASS INDEX: 33.43 KG/M2

## 2023-11-14 DIAGNOSIS — I44.2 CHB (COMPLETE HEART BLOCK) (HCC): Primary | Chronic | ICD-10-CM

## 2023-11-14 DIAGNOSIS — E11.9 TYPE 2 DIABETES MELLITUS WITHOUT COMPLICATION, WITHOUT LONG-TERM CURRENT USE OF INSULIN (HCC): ICD-10-CM

## 2023-11-14 DIAGNOSIS — I44.2 CHB (COMPLETE HEART BLOCK) (HCC): Chronic | ICD-10-CM

## 2023-11-14 DIAGNOSIS — Z95.810 ICD (IMPLANTABLE CARDIOVERTER-DEFIBRILLATOR), BIVENTRICULAR, IN SITU: ICD-10-CM

## 2023-11-14 DIAGNOSIS — I42.8 NICM (NONISCHEMIC CARDIOMYOPATHY) (HCC): ICD-10-CM

## 2023-11-14 DIAGNOSIS — I42.9 CARDIOMYOPATHY, UNSPECIFIED TYPE (HCC): Chronic | ICD-10-CM

## 2023-11-14 DIAGNOSIS — I49.3 PVC (PREMATURE VENTRICULAR CONTRACTION): ICD-10-CM

## 2023-11-14 DIAGNOSIS — I50.22 CHRONIC SYSTOLIC HEART FAILURE (HCC): Chronic | ICD-10-CM

## 2023-11-14 DIAGNOSIS — I50.22 CHRONIC SYSTOLIC CONGESTIVE HEART FAILURE (HCC): ICD-10-CM

## 2023-11-14 DIAGNOSIS — Z95.810 BIVENTRICULAR AUTOMATIC IMPLANTABLE CARDIOVERTER DEFIBRILLATOR IN SITU: Primary | Chronic | ICD-10-CM

## 2023-12-07 PROCEDURE — 93295 DEV INTERROG REMOTE 1/2/MLT: CPT | Performed by: INTERNAL MEDICINE

## 2023-12-07 PROCEDURE — 93296 REM INTERROG EVL PM/IDS: CPT | Performed by: INTERNAL MEDICINE

## 2024-01-03 ENCOUNTER — TELEPHONE (OUTPATIENT)
Dept: CARDIOLOGY CLINIC | Age: 64
End: 2024-01-03

## 2024-01-03 NOTE — TELEPHONE ENCOUNTER
Pt called and stated that they are reading his pacemaker once a month and he does not want this to happen. He would like these to stop please contact him at [phone] or [phone].

## 2024-01-03 NOTE — TELEPHONE ENCOUNTER
As of 08.30.2022, monthly Optivol/HF monitoring was discontinued as previously requested by patient, with only quarterly EP transmissions scheduled. There may be some confusion based on the timing of a recent OV device check and the most recent remote transmission/billing interval. The device OV check on 11.14.23 is independent of the remote transmission schedule (most recent transmission 11.23.23 applies to the date of service on 12.07.23 for the 91 day EP monitoring interval ranging from 09.08.23-12.07.23). Please reassure patient that he is only being monitored for quarterly EP intervals as requested. The next EP monitoring interval ranges from 12.08.23-03.07.24 and is currently scheduled for 02.22.24 (if no additional alerts/transmissions are received during that timeframe). Thanks!

## 2024-03-19 PROCEDURE — 93295 DEV INTERROG REMOTE 1/2/MLT: CPT | Performed by: INTERNAL MEDICINE

## 2024-03-19 PROCEDURE — 93296 REM INTERROG EVL PM/IDS: CPT | Performed by: INTERNAL MEDICINE

## 2024-04-22 NOTE — PROGRESS NOTES
swelling or muscle pain  Neurological ROS: negative for - confusion, dizziness, gait disturbance, headaches, numbness/tingling, seizures, speech problems, tremors, visual changes or weakness  Dermatological ROS: negative for - rash    Physical Examination:  Vitals:    04/29/24 1501   BP: 110/72   Pulse: 73           Constitutional: Oriented. No distress.   Head: Normocephalic and atraumatic.   Mouth/Throat: Oropharynx is clear and moist.   Eyes: Conjunctivae normal. EOM are normal.   Neck: Normal range of motion. Neck supple. No rigidity.  No JVD present.   Cardiovascular: Normal rate, regular rhythm, S1&S2 and intact distal pulses.   Pulmonary/Chest: Bilateral respiratory sounds. No wheezes. No rhonchi.    Abdominal: Soft. Bowel sounds present. No distension, No tenderness.   Musculoskeletal: No tenderness. No edema    Lymphadenopathy: Has no cervical adenopathy.   Neurological: Alert and oriented. Cranial nerve appears intact, No Gross deficit   Skin: Skin is warm and dry. No rash noted.   Psychiatric: Has a normal mood, affect and behavior     Labs:  Reviewed.     ECG: reviewed, Sinus  Rhythm, with QRS duration 146  ms. QT  430 ms    Studies:   1.  Echo: 10/6/2020  Summary   Normal left ventricular size.   Mild concentric left ventricular hypertrophy.   Left ventricular systolic function appears at the lower limits of normal   with an estimated ejection fraction of 50%-55%.   Septal bounce likely due to pacing apparatus .   Normal diastolic function.   Normal right ventricular size with decreased systolic function.   Pacer / ICD wire is visualized in the right ventricle.   Aortic valve appears sclerotic but opens adequately.   Trivial tricuspid regurgitation.   Estimated pulmonary artery systolic pressure is at 20 mmHg assuming a right   atrial pressure of 3 mmHg.    2. Cath: 2014, Dr Johnson  Angiographic Findings:  Right dominant system  Left Main:  Absent. 2 separate ostia for the LAD and Lcx.   Left Anterior

## 2024-04-29 ENCOUNTER — OFFICE VISIT (OUTPATIENT)
Dept: CARDIOLOGY CLINIC | Age: 64
End: 2024-04-29
Payer: COMMERCIAL

## 2024-04-29 ENCOUNTER — NURSE ONLY (OUTPATIENT)
Dept: CARDIOLOGY CLINIC | Age: 64
End: 2024-04-29
Payer: COMMERCIAL

## 2024-04-29 VITALS
WEIGHT: 234 LBS | DIASTOLIC BLOOD PRESSURE: 72 MMHG | SYSTOLIC BLOOD PRESSURE: 110 MMHG | HEART RATE: 73 BPM | BODY MASS INDEX: 33.58 KG/M2

## 2024-04-29 DIAGNOSIS — I47.29 NSVT (NONSUSTAINED VENTRICULAR TACHYCARDIA) (HCC): ICD-10-CM

## 2024-04-29 DIAGNOSIS — I44.2 CHB (COMPLETE HEART BLOCK) (HCC): Chronic | ICD-10-CM

## 2024-04-29 DIAGNOSIS — Z95.810 BIVENTRICULAR AUTOMATIC IMPLANTABLE CARDIOVERTER DEFIBRILLATOR IN SITU: Primary | ICD-10-CM

## 2024-04-29 DIAGNOSIS — I42.9 CARDIOMYOPATHY, UNSPECIFIED TYPE (HCC): Chronic | ICD-10-CM

## 2024-04-29 DIAGNOSIS — I50.22 CHRONIC SYSTOLIC HEART FAILURE (HCC): Chronic | ICD-10-CM

## 2024-04-29 DIAGNOSIS — I44.2 CHB (COMPLETE HEART BLOCK) (HCC): ICD-10-CM

## 2024-04-29 DIAGNOSIS — I49.3 PVC (PREMATURE VENTRICULAR CONTRACTION): ICD-10-CM

## 2024-04-29 DIAGNOSIS — I50.22 CHRONIC SYSTOLIC HEART FAILURE (HCC): ICD-10-CM

## 2024-04-29 DIAGNOSIS — Z95.810 BIVENTRICULAR AUTOMATIC IMPLANTABLE CARDIOVERTER DEFIBRILLATOR IN SITU: Primary | Chronic | ICD-10-CM

## 2024-04-29 DIAGNOSIS — I42.8 NICM (NONISCHEMIC CARDIOMYOPATHY) (HCC): ICD-10-CM

## 2024-04-29 PROCEDURE — 3074F SYST BP LT 130 MM HG: CPT | Performed by: INTERNAL MEDICINE

## 2024-04-29 PROCEDURE — 99214 OFFICE O/P EST MOD 30 MIN: CPT | Performed by: INTERNAL MEDICINE

## 2024-04-29 PROCEDURE — 3078F DIAST BP <80 MM HG: CPT | Performed by: INTERNAL MEDICINE

## 2024-04-29 PROCEDURE — 93000 ELECTROCARDIOGRAM COMPLETE: CPT | Performed by: INTERNAL MEDICINE

## 2024-04-29 NOTE — PATIENT INSTRUCTIONS
Generator Change for Internal Cardioverter Defibrillator    Date of Procedure:     Time of Arrival:     Cardiologist performing procedure: Dr. Santos    Arrive at [facility] through the main entrance.  Check in at the Outpatient Diagnostic desk on the 1st floor.    Do not eat or drink anything after midnight the night before the procedure. You may brush your teeth and rinse the morning of the procedure.    Take all your other routine medications the morning of the procedure with the following exceptions:  If you are taking diabetic medications, please HOLD on the day of the procedure (including insulin).  If you take Lantus insulin, take HALF of your usual dose the night before.  If you take a water pill, please HOLD on the day of the procedure.    Lab work is due within a week of the procedure. You do not need to be fasting for these labs. This can be done at the [facility] Outpatient lab at [address]    Hold *OAC** for 2 days prior to procedure.    Please use Hibiclens soap (or any antibacterial soap such as Dial) to wash neck, chest, and abdomen the night before (or the morning of) the procedure.      Do not apply any lotion, powder, or deodorant after your shower and the morning of the procedure.    Please bring a list of your medications to the hospital with you.    You must have someone available to drive you home that day and stay with you at home the night of the procedure.    If you are unable to make this appointment, please call [facility] [place], at [phone].

## 2024-04-30 PROCEDURE — 93284 PRGRMG EVAL IMPLANTABLE DFB: CPT | Performed by: INTERNAL MEDICINE

## 2024-06-17 ENCOUNTER — TELEPHONE (OUTPATIENT)
Dept: CARDIOLOGY CLINIC | Age: 64
End: 2024-06-17

## 2024-06-17 ENCOUNTER — PREP FOR PROCEDURE (OUTPATIENT)
Dept: CARDIOLOGY CLINIC | Age: 64
End: 2024-06-17

## 2024-06-17 DIAGNOSIS — I42.8 NONISCHEMIC CARDIOMYOPATHY (HCC): ICD-10-CM

## 2024-06-17 DIAGNOSIS — I42.8 NICM (NONISCHEMIC CARDIOMYOPATHY) (HCC): ICD-10-CM

## 2024-06-17 DIAGNOSIS — Z45.02 IMPLANTABLE CARDIOVERTER-DEFIBRILLATOR (ICD) GENERATOR END OF LIFE: ICD-10-CM

## 2024-06-17 DIAGNOSIS — Z95.810 BIVENTRICULAR AUTOMATIC IMPLANTABLE CARDIOVERTER DEFIBRILLATOR IN SITU: Primary | ICD-10-CM

## 2024-06-17 RX ORDER — SODIUM CHLORIDE 0.9 % (FLUSH) 0.9 %
5-40 SYRINGE (ML) INJECTION PRN
Status: CANCELLED | OUTPATIENT
Start: 2024-06-17

## 2024-06-17 RX ORDER — SODIUM CHLORIDE 0.9 % (FLUSH) 0.9 %
5-40 SYRINGE (ML) INJECTION EVERY 12 HOURS SCHEDULED
Status: CANCELLED | OUTPATIENT
Start: 2024-06-17

## 2024-06-17 RX ORDER — SODIUM CHLORIDE 9 MG/ML
INJECTION, SOLUTION INTRAVENOUS PRN
Status: CANCELLED | OUTPATIENT
Start: 2024-06-17

## 2024-06-17 NOTE — TELEPHONE ENCOUNTER
Spoke with the pt and got him scheduled for procedure. We went over instructions below and he verbalized understanding.     Generator Change for Internal Cardioverter Defibrillator     Date of Procedure: 7/8/24     Time of Arrival: 6:30 am  Procedure time: 8:00 am      Cardiologist performing procedure: Dr. Santos     Arrive at [facility] through the main entrance.  Check in at the Outpatient Diagnostic desk on the 1st floor.     Do not eat or drink anything after midnight the night before the procedure. You may brush your teeth and rinse the morning of the procedure.     Take all your other routine medications the morning of the procedure with the following exceptions:  If you are taking diabetic medications, please HOLD on the day of the procedure (including insulin).  If you take Lantus insulin, take HALF of your usual dose the night before.     Lab work is due within a week of the procedure. You do not need to be fasting for these labs. This can be done at the [facility] Outpatient lab at [address]      Please use Hibiclens soap (or any antibacterial soap such as Dial) to wash neck, chest, and abdomen the night before (or the morning of) the procedure.       Do not apply any lotion, powder, or deodorant after your shower and the morning of the procedure.     Please bring a list of your medications to the hospital with you.     You must have someone available to drive you home that day and stay with you at home the night of the procedure.     If you are unable to make this appointment, please call [facility] [place], at [phone].    Qgenda updated / updated cupid / emailed cath lab

## 2024-06-17 NOTE — TELEPHONE ENCOUNTER
Carelink alert transmission d/t RRT/DEANDRE reached 06.17.24. Report sent to  for review. Please see Murj for additional details.

## 2024-06-17 NOTE — TELEPHONE ENCOUNTER
Please call and schedule pt for gen change with UL. Please review instructions below with pt (given at OV on 4/29/24).      Generator Change for Internal Cardioverter Defibrillator     Date of Procedure:      Time of Arrival:      Cardiologist performing procedure: Dr. Santos     Arrive at [facility] through the main entrance.  Check in at the Outpatient Diagnostic desk on the 1st floor.     Do not eat or drink anything after midnight the night before the procedure. You may brush your teeth and rinse the morning of the procedure.     Take all your other routine medications the morning of the procedure with the following exceptions:  If you are taking diabetic medications, please HOLD on the day of the procedure (including insulin).  If you take Lantus insulin, take HALF of your usual dose the night before.     Lab work is due within a week of the procedure. You do not need to be fasting for these labs. This can be done at the [facility] Outpatient lab at [address]      Please use Hibiclens soap (or any antibacterial soap such as Dial) to wash neck, chest, and abdomen the night before (or the morning of) the procedure.       Do not apply any lotion, powder, or deodorant after your shower and the morning of the procedure.     Please bring a list of your medications to the hospital with you.     You must have someone available to drive you home that day and stay with you at home the night of the procedure.     If you are unable to make this appointment, please call [facility] [place], at [phone].

## 2024-07-03 ENCOUNTER — TELEPHONE (OUTPATIENT)
Dept: CARDIOLOGY CLINIC | Age: 64
End: 2024-07-03

## 2024-07-03 DIAGNOSIS — E11.9 TYPE 2 DIABETES MELLITUS WITHOUT COMPLICATION, WITHOUT LONG-TERM CURRENT USE OF INSULIN (HCC): Primary | ICD-10-CM

## 2024-07-03 NOTE — TELEPHONE ENCOUNTER
Spoke with patient gave him Dr. Ayanna Caldwell phone number and referral placed. Patient is stressed and wants to get his battery replaced as soon as possible. He will reach out to PCP to make an appointment. He would like a call from the nurse on Monday.

## 2024-07-03 NOTE — TELEPHONE ENCOUNTER
Radha Eli MD Wood, Frances, APRN - CNP  Cc: [facility]  Uncontrolled DM, very high Hb A1c.  very high risk of infection and anesthesia complications.    Probably needs to be on Insulin - Let's cancel his procedure, have him follow with his PCP or ask him to find a new PCP and control his DM better.

## 2024-07-03 NOTE — TELEPHONE ENCOUNTER
Spoke with patient, his PCP passed away, he saw another PCP that has moved, currently does not have a PCP. He has not been taking his diabetic medication due to it causing him to use the bathroom. He is very untrusting of physicians since COVID and does not have time to go to a PCP because he is working full time. He is more concerned about his battery than his diabetes. Reviewed the risk of infection and anaesthesia complications. Patient still wants his battery changed on 7/8/24 and is unhappy. Please advise.

## 2024-07-05 NOTE — TELEPHONE ENCOUNTER
LVM for pt advising that we are cancelling the gen change on 7/8. Reinforced he needs an OV with Dr. Patiño to get blood sugars under control. Device reached DEANDRE on 6/17, so we have 3 months to work with.

## 2024-07-08 ENCOUNTER — TELEPHONE (OUTPATIENT)
Dept: CARDIOLOGY CLINIC | Age: 64
End: 2024-07-08

## 2024-07-08 DIAGNOSIS — I50.22 CHRONIC SYSTOLIC HEART FAILURE (HCC): Primary | ICD-10-CM

## 2024-07-08 DIAGNOSIS — I44.2 CHB (COMPLETE HEART BLOCK) (HCC): ICD-10-CM

## 2024-07-08 NOTE — TELEPHONE ENCOUNTER
Spoke with pt. Since he went back on his DM meds, watching his diet, and exercising, his BS at home has been better (170-180). He is scheduled to see a new PCP this week. He is very concerned about getting his PPM battery changed out, sooner than later. Reached DEANDRE on 6/17/24. Will r/s gen change for 4 weeks.    Please call pt and r/s his gen change for week of 8/5 (maybe 8/9?). I have reordered preop labs to be done within 7 days of procedure.

## 2024-07-08 NOTE — TELEPHONE ENCOUNTER
Pt would like a call back to see if he can get a referral for a new pcp along with questions about his pacemaker. Can reach pt at [phone] (home) &   [phone] (mobile)

## 2024-07-08 NOTE — TELEPHONE ENCOUNTER
Please reach out to the patient to see if we can help set up a new patient appointment (if patient agrees).   Previous PCP passed away. Was scheduled to have generator battery change with Dr. Santos but canceled due to uncontrolled diabetes.

## 2024-07-10 ENCOUNTER — OFFICE VISIT (OUTPATIENT)
Dept: INTERNAL MEDICINE CLINIC | Age: 64
End: 2024-07-10
Payer: COMMERCIAL

## 2024-07-10 VITALS
WEIGHT: 220 LBS | SYSTOLIC BLOOD PRESSURE: 118 MMHG | DIASTOLIC BLOOD PRESSURE: 70 MMHG | HEIGHT: 70 IN | BODY MASS INDEX: 31.5 KG/M2

## 2024-07-10 DIAGNOSIS — E11.9 TYPE 2 DIABETES MELLITUS WITHOUT COMPLICATION, WITHOUT LONG-TERM CURRENT USE OF INSULIN (HCC): Primary | ICD-10-CM

## 2024-07-10 PROBLEM — E11.65 TYPE 2 DIABETES MELLITUS WITH HYPERGLYCEMIA (HCC): Status: ACTIVE | Noted: 2024-07-10

## 2024-07-10 PROCEDURE — 3078F DIAST BP <80 MM HG: CPT | Performed by: STUDENT IN AN ORGANIZED HEALTH CARE EDUCATION/TRAINING PROGRAM

## 2024-07-10 PROCEDURE — 99214 OFFICE O/P EST MOD 30 MIN: CPT | Performed by: STUDENT IN AN ORGANIZED HEALTH CARE EDUCATION/TRAINING PROGRAM

## 2024-07-10 PROCEDURE — G2211 COMPLEX E/M VISIT ADD ON: HCPCS | Performed by: STUDENT IN AN ORGANIZED HEALTH CARE EDUCATION/TRAINING PROGRAM

## 2024-07-10 PROCEDURE — 3046F HEMOGLOBIN A1C LEVEL >9.0%: CPT | Performed by: STUDENT IN AN ORGANIZED HEALTH CARE EDUCATION/TRAINING PROGRAM

## 2024-07-10 PROCEDURE — 3074F SYST BP LT 130 MM HG: CPT | Performed by: STUDENT IN AN ORGANIZED HEALTH CARE EDUCATION/TRAINING PROGRAM

## 2024-07-10 RX ORDER — METFORMIN HYDROCHLORIDE 500 MG/1
2000 TABLET, EXTENDED RELEASE ORAL
Qty: 360 TABLET | Refills: 1 | Status: SHIPPED | OUTPATIENT
Start: 2024-07-10 | End: 2025-01-06

## 2024-07-10 RX ORDER — PIOGLITAZONEHYDROCHLORIDE 30 MG/1
30 TABLET ORAL DAILY
Qty: 90 TABLET | Refills: 1 | Status: SHIPPED | OUTPATIENT
Start: 2024-07-10

## 2024-07-10 RX ORDER — CALCIUM CITRATE/VITAMIN D3 200MG-6.25
1 TABLET ORAL 2 TIMES DAILY
Qty: 150 EACH | Refills: 5 | Status: SHIPPED | OUTPATIENT
Start: 2024-07-10

## 2024-07-10 SDOH — ECONOMIC STABILITY: INCOME INSECURITY: HOW HARD IS IT FOR YOU TO PAY FOR THE VERY BASICS LIKE FOOD, HOUSING, MEDICAL CARE, AND HEATING?: NOT HARD AT ALL

## 2024-07-10 SDOH — ECONOMIC STABILITY: FOOD INSECURITY: WITHIN THE PAST 12 MONTHS, YOU WORRIED THAT YOUR FOOD WOULD RUN OUT BEFORE YOU GOT MONEY TO BUY MORE.: NEVER TRUE

## 2024-07-10 SDOH — ECONOMIC STABILITY: HOUSING INSECURITY
IN THE LAST 12 MONTHS, WAS THERE A TIME WHEN YOU DID NOT HAVE A STEADY PLACE TO SLEEP OR SLEPT IN A SHELTER (INCLUDING NOW)?: NO

## 2024-07-10 SDOH — ECONOMIC STABILITY: FOOD INSECURITY: WITHIN THE PAST 12 MONTHS, THE FOOD YOU BOUGHT JUST DIDN'T LAST AND YOU DIDN'T HAVE MONEY TO GET MORE.: NEVER TRUE

## 2024-07-10 ASSESSMENT — PATIENT HEALTH QUESTIONNAIRE - PHQ9
SUM OF ALL RESPONSES TO PHQ QUESTIONS 1-9: 0
2. FEELING DOWN, DEPRESSED OR HOPELESS: NOT AT ALL
SUM OF ALL RESPONSES TO PHQ9 QUESTIONS 1 & 2: 0
1. LITTLE INTEREST OR PLEASURE IN DOING THINGS: NOT AT ALL
SUM OF ALL RESPONSES TO PHQ QUESTIONS 1-9: 0

## 2024-07-10 NOTE — PROGRESS NOTES
7/10/2024    Jim David (:  1960) is a 64 y.o. male, here for evaluation of the following medical concerns:    Chief Complaint   Patient presents with    Establish Care     Blood sugar on  was 364 and 7/10 on 136, has started exercise, cut out pop and started DM meds again        HPI    Patient is here today for diabetes management.  Hemoglobin A1c 2024 was 11.6%. Patient's pacemaker battery is dying, had procedure planned for battery change of his PM/ICD. Procedure was postponed 1 mo due to uncontrolled HbA1c.    He had stopped taking medicine and was controlling things with diet and exercise. He gained some weight and started drinking soda and not watching his diet. Since he had his recent elevated A1c, he has gotten back to taking medicine, has cut out soda, and is walking 3 miles a day.      Prior to Visit Medications    Medication Sig Taking? Authorizing Provider   metFORMIN (GLUCOPHAGE-XR) 500 MG extended release tablet Take 4 tablets by mouth daily (with breakfast) Yes Tahmina Pagan MD   blood glucose test strips (TRUE METRIX BLOOD GLUCOSE TEST) strip 1 each by In Vitro route 2 times daily As needed. Yes Tahmina Pagan MD   empagliflozin (JARDIANCE) 25 MG tablet Take 1 tablet by mouth daily Yes Thamina Pagan MD   pioglitazone (ACTOS) 30 MG tablet Take 1 tablet by mouth daily Yes Tahmina Pagan MD   metoprolol succinate (TOPROL XL) 100 MG extended release tablet Take 1 tablet by mouth daily Yes Gregoria Krishnamurthy APRN - CNP   Blood Glucose Monitoring Suppl (TRUE METRIX AIR GLUCOSE METER) DANGELO Check sugar twice a day Yes Jesse Valderrama MD   Lancets MISC 1 each by Does not apply route 2 times daily Yes Jesse Valderrama MD   famotidine (PEPCID) 20 MG tablet Take 1 tablet by mouth nightly as needed Yes ProviderGary MD        Allergies   Allergen Reactions    Doxycycline     Lisinopril Other (See Comments)     dizziness    Losartan     Septra [Sulfamethoxazole-Trimethoprim] Other

## 2024-07-16 DIAGNOSIS — I10 PRIMARY HYPERTENSION: ICD-10-CM

## 2024-07-16 RX ORDER — METOPROLOL SUCCINATE 100 MG/1
100 TABLET, EXTENDED RELEASE ORAL DAILY
Qty: 90 TABLET | Refills: 3 | Status: SHIPPED | OUTPATIENT
Start: 2024-07-16

## 2024-07-16 NOTE — TELEPHONE ENCOUNTER
Requested Prescriptions     Pending Prescriptions Disp Refills    metoprolol succinate (TOPROL XL) 100 MG extended release tablet [Pharmacy Med Name: METOPROLOL SUCC  MG TAB] 90 tablet 3     Sig: TAKE ONE TABLET BY MOUTH DAILY            Checked Correct Pharmacy: Yes    Any changes since last refill? No     Number: 90    Refills: 3    Last Office Visit: 4/29/2024     Next Office Visit: 01.09.2025      Last Labs: 07.01.2024

## 2024-07-24 ENCOUNTER — OFFICE VISIT (OUTPATIENT)
Dept: INTERNAL MEDICINE CLINIC | Age: 64
End: 2024-07-24
Payer: COMMERCIAL

## 2024-07-24 VITALS
HEIGHT: 70 IN | BODY MASS INDEX: 31.21 KG/M2 | HEART RATE: 74 BPM | OXYGEN SATURATION: 98 % | DIASTOLIC BLOOD PRESSURE: 62 MMHG | TEMPERATURE: 97.4 F | SYSTOLIC BLOOD PRESSURE: 112 MMHG | WEIGHT: 218 LBS

## 2024-07-24 DIAGNOSIS — E11.65 TYPE 2 DIABETES MELLITUS WITH HYPERGLYCEMIA, WITHOUT LONG-TERM CURRENT USE OF INSULIN (HCC): Primary | ICD-10-CM

## 2024-07-24 PROBLEM — E11.9 TYPE 2 DIABETES MELLITUS WITHOUT COMPLICATION, WITHOUT LONG-TERM CURRENT USE OF INSULIN (HCC): Status: RESOLVED | Noted: 2021-10-08 | Resolved: 2024-07-24

## 2024-07-24 PROCEDURE — 3078F DIAST BP <80 MM HG: CPT | Performed by: STUDENT IN AN ORGANIZED HEALTH CARE EDUCATION/TRAINING PROGRAM

## 2024-07-24 PROCEDURE — 3074F SYST BP LT 130 MM HG: CPT | Performed by: STUDENT IN AN ORGANIZED HEALTH CARE EDUCATION/TRAINING PROGRAM

## 2024-07-24 PROCEDURE — 99214 OFFICE O/P EST MOD 30 MIN: CPT | Performed by: STUDENT IN AN ORGANIZED HEALTH CARE EDUCATION/TRAINING PROGRAM

## 2024-07-24 PROCEDURE — 3046F HEMOGLOBIN A1C LEVEL >9.0%: CPT | Performed by: STUDENT IN AN ORGANIZED HEALTH CARE EDUCATION/TRAINING PROGRAM

## 2024-07-24 PROCEDURE — G2211 COMPLEX E/M VISIT ADD ON: HCPCS | Performed by: STUDENT IN AN ORGANIZED HEALTH CARE EDUCATION/TRAINING PROGRAM

## 2024-07-24 ASSESSMENT — ENCOUNTER SYMPTOMS
SHORTNESS OF BREATH: 0
ABDOMINAL PAIN: 0
DIARRHEA: 0
VOMITING: 0
NAUSEA: 0
CONSTIPATION: 0
SORE THROAT: 0

## 2024-07-24 NOTE — PROGRESS NOTES
Heart sounds: Normal heart sounds.   Pulmonary:      Effort: Pulmonary effort is normal. No respiratory distress.      Breath sounds: Normal breath sounds. No rales.   Abdominal:      General: There is no distension.      Tenderness: There is no abdominal tenderness.   Musculoskeletal:         General: No swelling.   Skin:     General: Skin is warm and dry.   Neurological:      General: No focal deficit present.      Mental Status: He is alert and oriented to person, place, and time. Mental status is at baseline.      Gait: Gait normal.   Psychiatric:         Mood and Affect: Mood normal.         ASSESSMENT/PLAN:    Type 2 diabetes mellitus with hyperglycemia  This problem is uncontrolled but appears significantly improved per blood glucose log (see HPI). A1c on 7/1/2024 was 11.6%. Patient had stopped healthy diet and exercise and had been off his meds. Since restarting lifestyle management and medication, fasting glucose has come down from 356 to 134.  Increased metformin to 2 g daily at last visit.  - continue actos  -Continue metformin 2 g daily  - continue jardiance-try taking this medication in the evening due to slightly elevated fasting a.m. glucose.  - fructosamine and hemoglobin A1c ordered to be drawn in a few weeks  - low carb diet, exercise       Orders Placed This Encounter    Hemoglobin A1C     Standing Status:   Future     Standing Expiration Date:   7/24/2025        No follow-ups on file.

## 2024-07-24 NOTE — H&P (VIEW-ONLY)
2024    Jim David (:  1960) is a 64 y.o. male, here for evaluation of the following medical concerns:    Chief Complaint   Patient presents with    2 Week Follow-Up     DM         HPI    Patient is here today for diabetes follow-up.  His blood glucose log is below.    Patient takes Jardiance around 7 am, actos around 8, and metformin around 8:30.   Blood sugar is slightly above goal in AM (130s-140s). Post prandial around 140s-150s.  He has been walking 2 to 3 miles every morning eating a low-carb diet at home.        Review of Systems   Constitutional:  Negative for fatigue.   HENT:  Negative for congestion and sore throat.    Respiratory:  Negative for shortness of breath.    Cardiovascular:  Negative for chest pain.   Gastrointestinal:  Negative for abdominal pain, constipation, diarrhea, nausea and vomiting.   Genitourinary:  Negative for difficulty urinating.   Neurological:  Negative for weakness and numbness.       Prior to Visit Medications    Medication Sig Taking? Authorizing Provider   metoprolol succinate (TOPROL XL) 100 MG extended release tablet TAKE ONE TABLET BY MOUTH DAILY Yes Marychuy Oneal, APRN - CNP   metFORMIN (GLUCOPHAGE-XR) 500 MG extended release tablet Take 4 tablets by mouth daily (with breakfast) Yes Tahmina Pagan MD   blood glucose test strips (TRUE METRIX BLOOD GLUCOSE TEST) strip 1 each by In Vitro route 2 times daily As needed. Yes Tahmina Pagan MD   empagliflozin (JARDIANCE) 25 MG tablet Take 1 tablet by mouth daily Yes Tahmina Pagan MD   pioglitazone (ACTOS) 30 MG tablet Take 1 tablet by mouth daily Yes Tahmina Pagan MD   Blood Glucose Monitoring Suppl (TRUE METRIX AIR GLUCOSE METER) DANGELO Check sugar twice a day Yes Jesse Valderrama MD   Lancets MISC 1 each by Does not apply route 2 times daily Yes Jesse Valderrama MD   famotidine (PEPCID) 20 MG tablet Take 1 tablet by mouth nightly as needed Yes ProviderGary MD        Allergies   Allergen

## 2024-07-24 NOTE — TELEPHONE ENCOUNTER
Pt called to update [facility] on the current situation for the procedure. Pt can be best reached on mobile   [phone]

## 2024-07-24 NOTE — ASSESSMENT & PLAN NOTE
This problem is uncontrolled but appears significantly improved per blood glucose log (see HPI). A1c on 7/1/2024 was 11.6%. Patient had stopped healthy diet and exercise and had been off his meds. Since restarting lifestyle management and medication, fasting glucose has come down from 356 to 134.  Increased metformin to 2 g daily at last visit.  - continue actos  -Continue metformin 2 g daily  - continue jardiance-try taking this medication in the evening due to slightly elevated fasting a.m. glucose.  - fructosamine and hemoglobin A1c ordered to be drawn in a few weeks  - low carb diet, exercise

## 2024-07-25 NOTE — TELEPHONE ENCOUNTER
Pt reports that his BS have been under significantly better control since seeing Dr. Pagan. He will repeat preop labs within 1 week of scheduled gen change on 8/9/24.

## 2024-08-02 ENCOUNTER — TELEPHONE (OUTPATIENT)
Dept: CARDIOLOGY CLINIC | Age: 64
End: 2024-08-02

## 2024-08-02 DIAGNOSIS — E11.9 TYPE 2 DIABETES MELLITUS WITHOUT COMPLICATION, WITHOUT LONG-TERM CURRENT USE OF INSULIN (HCC): ICD-10-CM

## 2024-08-02 DIAGNOSIS — E11.65 TYPE 2 DIABETES MELLITUS WITH HYPERGLYCEMIA, WITHOUT LONG-TERM CURRENT USE OF INSULIN (HCC): ICD-10-CM

## 2024-08-02 DIAGNOSIS — I50.22 CHRONIC SYSTOLIC HEART FAILURE (HCC): ICD-10-CM

## 2024-08-02 LAB
ANION GAP SERPL CALCULATED.3IONS-SCNC: 16 MMOL/L (ref 3–16)
BUN SERPL-MCNC: 20 MG/DL (ref 7–20)
CALCIUM SERPL-MCNC: 9.2 MG/DL (ref 8.3–10.6)
CHLORIDE SERPL-SCNC: 102 MMOL/L (ref 99–110)
CO2 SERPL-SCNC: 21 MMOL/L (ref 21–32)
CREAT SERPL-MCNC: 1.1 MG/DL (ref 0.8–1.3)
GFR SERPLBLD CREATININE-BSD FMLA CKD-EPI: 76 ML/MIN/{1.73_M2}
GLUCOSE SERPL-MCNC: 130 MG/DL (ref 70–99)
INR PPP: 0.96 (ref 0.85–1.15)
POTASSIUM SERPL-SCNC: 4 MMOL/L (ref 3.5–5.1)
PROTHROMBIN TIME: 13 SEC (ref 11.9–14.9)
REASON FOR REJECTION: NORMAL
REJECTED TEST: NORMAL
SODIUM SERPL-SCNC: 139 MMOL/L (ref 136–145)

## 2024-08-02 NOTE — TELEPHONE ENCOUNTER
Attempted to contact pt to go over upcoming procedure. Phone apeared to be disconnected.      Generator Change for Internal Cardioverter Defibrillator     Date of Procedure:      Time of Arrival:      Cardiologist performing procedure: Dr. Santos     Arrive at [facility] through the main entrance.  Check in at the Outpatient Diagnostic desk on the 1st floor.     Do not eat or drink anything after midnight the night before the procedure. You may brush your teeth and rinse the morning of the procedure.     Take all your other routine medications the morning of the procedure with the following exceptions:  If you are taking diabetic medications, please HOLD on the day of the procedure (including insulin).  If you take Lantus insulin, take HALF of your usual dose the night before.  If you take a water pill, please HOLD on the day of the procedure.     Lab work is due within a week of the procedure. You do not need to be fasting for these labs. This can be done at the [facility] Outpatient lab at [address]     Hold *OAC** for 2 days prior to procedure.     Please use Hibiclens soap (or any antibacterial soap such as Dial) to wash neck, chest, and abdomen the night before (or the morning of) the procedure.       Do not apply any lotion, powder, or deodorant after your shower and the morning of the procedure.     Please bring a list of your medications to the hospital with you.     You must have someone available to drive you home that day and stay with you at home the night of the procedure.     If you are unable to make this appointment, please call [facility] [place], at [phone].

## 2024-08-03 LAB
EST. AVERAGE GLUCOSE BLD GHB EST-MCNC: 208.7 MG/DL
HBA1C MFR BLD: 8.9 %

## 2024-08-04 LAB — FRUCTOSAMINE SERPL-SCNC: 308 UMOL/L (ref 205–285)

## 2024-08-05 ENCOUNTER — TELEPHONE (OUTPATIENT)
Dept: CARDIOLOGY CLINIC | Age: 64
End: 2024-08-05

## 2024-08-05 RX ORDER — SODIUM CHLORIDE 9 MG/ML
INJECTION, SOLUTION INTRAVENOUS PRN
Status: CANCELLED | OUTPATIENT
Start: 2024-08-05

## 2024-08-05 RX ORDER — SODIUM CHLORIDE 0.9 % (FLUSH) 0.9 %
5-40 SYRINGE (ML) INJECTION EVERY 12 HOURS SCHEDULED
Status: CANCELLED | OUTPATIENT
Start: 2024-08-05

## 2024-08-05 RX ORDER — SODIUM CHLORIDE 0.9 % (FLUSH) 0.9 %
5-40 SYRINGE (ML) INJECTION PRN
Status: CANCELLED | OUTPATIENT
Start: 2024-08-05

## 2024-08-05 NOTE — TELEPHONE ENCOUNTER
Spoke with the pt and he is agreeable to come in earlier on 8/9/24.     Generator Change for Internal Cardioverter Defibrillator     Date of Procedure: 8/9/24     Time of Arrival: 9:00 am (New time)   Procedure time: 10:30 am (New time)     Cardiologist performing procedure: Dr. Tom Burr updated/ updated email sent to cath lab

## 2024-08-05 NOTE — TELEPHONE ENCOUNTER
Pt called wanting to know if it is really necessary for him to come back to get his blood drawn again prior to see his battery change procedure on 8/9. He says it is a hassle trying to take time out of his day to get this done. Please call pt back to discuss.     [phone]

## 2024-08-05 NOTE — TELEPHONE ENCOUNTER
LVM for pt to return call. We would like to move his procedure time up slightly. I need to talk with him or family when they return call.     Generator Change for Internal Cardioverter Defibrillator     Date of Procedure: 8/9/24     Time of Arrival: 9:00 am (New time)   Procedure time: 10:30 am (New time)     Cardiologist performing procedure: Dr. Santos

## 2024-08-05 NOTE — TELEPHONE ENCOUNTER
Spoke with pt. Informed him that [facility] plans to order lab needed and he can just have it drawn prior to upcoming procedure.

## 2024-08-05 NOTE — TELEPHONE ENCOUNTER
[facility] called to report a clotted CBC lab draw. The patient will be called to re-draw his labs.

## 2024-08-06 ENCOUNTER — TELEPHONE (OUTPATIENT)
Dept: INTERNAL MEDICINE CLINIC | Age: 64
End: 2024-08-06

## 2024-08-06 DIAGNOSIS — E11.65 TYPE 2 DIABETES MELLITUS WITH HYPERGLYCEMIA, WITHOUT LONG-TERM CURRENT USE OF INSULIN (HCC): Primary | ICD-10-CM

## 2024-08-08 NOTE — FLOWSHEET NOTE
Called patient about procedure. Told to be here at 0900 for procedure at 1030 Must be NPO after midnight but can take morning medication with sips of water. Patient stated they are not on blood thinners  prior to procedure as directed by the office. Told to have a responsible adult with them to take them home and stay with them afterwards, if they do not get admitted to hospital. Also, to bring a current list of medications. No other questions or concerns.

## 2024-08-09 ENCOUNTER — HOSPITAL ENCOUNTER (OUTPATIENT)
Age: 64
Setting detail: OUTPATIENT SURGERY
Discharge: HOME | End: 2024-08-09
Attending: INTERNAL MEDICINE | Admitting: INTERNAL MEDICINE
Payer: COMMERCIAL

## 2024-08-09 ENCOUNTER — ANESTHESIA EVENT (OUTPATIENT)
Dept: INTERVENTIONAL RADIOLOGY/VASCULAR | Age: 64
End: 2024-08-09
Payer: COMMERCIAL

## 2024-08-09 ENCOUNTER — ANESTHESIA (OUTPATIENT)
Dept: INTERVENTIONAL RADIOLOGY/VASCULAR | Age: 64
End: 2024-08-09
Payer: COMMERCIAL

## 2024-08-09 VITALS
DIASTOLIC BLOOD PRESSURE: 71 MMHG | TEMPERATURE: 97.6 F | HEIGHT: 70 IN | WEIGHT: 219.4 LBS | OXYGEN SATURATION: 98 % | SYSTOLIC BLOOD PRESSURE: 144 MMHG | RESPIRATION RATE: 20 BRPM | HEART RATE: 61 BPM | BODY MASS INDEX: 31.41 KG/M2

## 2024-08-09 DIAGNOSIS — I44.2 CHB (COMPLETE HEART BLOCK) (HCC): ICD-10-CM

## 2024-08-09 DIAGNOSIS — I42.8 NONISCHEMIC CARDIOMYOPATHY (HCC): ICD-10-CM

## 2024-08-09 DIAGNOSIS — Z95.810 BIVENTRICULAR AUTOMATIC IMPLANTABLE CARDIOVERTER DEFIBRILLATOR IN SITU: Primary | Chronic | ICD-10-CM

## 2024-08-09 LAB
ANION GAP SERPL CALCULATED.3IONS-SCNC: 13 MMOL/L (ref 3–16)
BUN SERPL-MCNC: 15 MG/DL (ref 7–20)
CALCIUM SERPL-MCNC: 9.2 MG/DL (ref 8.3–10.6)
CHLORIDE SERPL-SCNC: 105 MMOL/L (ref 99–110)
CO2 SERPL-SCNC: 21 MMOL/L (ref 21–32)
CREAT SERPL-MCNC: 1.1 MG/DL (ref 0.8–1.3)
DEPRECATED RDW RBC AUTO: 14.2 % (ref 12.4–15.4)
ECHO BSA: 2.22 M2
GFR SERPLBLD CREATININE-BSD FMLA CKD-EPI: 76 ML/MIN/{1.73_M2}
GLUCOSE SERPL-MCNC: 150 MG/DL (ref 70–99)
HCT VFR BLD AUTO: 43.8 % (ref 40.5–52.5)
HGB BLD-MCNC: 14.8 G/DL (ref 13.5–17.5)
INR BLD: 1.2 (ref 0.84–1.16)
MCH RBC QN AUTO: 30.6 PG (ref 26–34)
MCHC RBC AUTO-ENTMCNC: 33.9 G/DL (ref 31–36)
MCV RBC AUTO: 90.4 FL (ref 80–100)
PLATELET # BLD AUTO: 162 K/UL (ref 135–450)
PMV BLD AUTO: 7.9 FL (ref 5–10.5)
POTASSIUM SERPL-SCNC: 3.7 MMOL/L (ref 3.5–5.1)
RBC # BLD AUTO: 4.84 M/UL (ref 4.2–5.9)
SODIUM SERPL-SCNC: 139 MMOL/L (ref 136–145)
WBC # BLD AUTO: 5.5 K/UL (ref 4–11)

## 2024-08-09 PROCEDURE — C1882 AICD, OTHER THAN SING/DUAL: HCPCS | Performed by: INTERNAL MEDICINE

## 2024-08-09 PROCEDURE — 2580000003 HC RX 258: Performed by: ANESTHESIOLOGY

## 2024-08-09 PROCEDURE — 7100000010 HC PHASE II RECOVERY - FIRST 15 MIN: Performed by: INTERNAL MEDICINE

## 2024-08-09 PROCEDURE — 6360000002 HC RX W HCPCS: Performed by: INTERNAL MEDICINE

## 2024-08-09 PROCEDURE — 6360000002 HC RX W HCPCS: Performed by: NURSE ANESTHETIST, CERTIFIED REGISTERED

## 2024-08-09 PROCEDURE — 2709999900 HC NON-CHARGEABLE SUPPLY: Performed by: INTERNAL MEDICINE

## 2024-08-09 PROCEDURE — 80048 BASIC METABOLIC PNL TOTAL CA: CPT

## 2024-08-09 PROCEDURE — 2500000003 HC RX 250 WO HCPCS: Performed by: NURSE ANESTHETIST, CERTIFIED REGISTERED

## 2024-08-09 PROCEDURE — 33264 RMVL & RPLCMT DFB GEN MLT LD: CPT | Performed by: INTERNAL MEDICINE

## 2024-08-09 PROCEDURE — 2500000003 HC RX 250 WO HCPCS: Performed by: INTERNAL MEDICINE

## 2024-08-09 PROCEDURE — 3700000000 HC ANESTHESIA ATTENDED CARE: Performed by: INTERNAL MEDICINE

## 2024-08-09 PROCEDURE — 2720000010 HC SURG SUPPLY STERILE: Performed by: INTERNAL MEDICINE

## 2024-08-09 PROCEDURE — 2580000003 HC RX 258: Performed by: INTERNAL MEDICINE

## 2024-08-09 PROCEDURE — 85027 COMPLETE CBC AUTOMATED: CPT

## 2024-08-09 PROCEDURE — 3700000001 HC ADD 15 MINUTES (ANESTHESIA): Performed by: INTERNAL MEDICINE

## 2024-08-09 PROCEDURE — 85610 PROTHROMBIN TIME: CPT

## 2024-08-09 PROCEDURE — 93005 ELECTROCARDIOGRAM TRACING: CPT | Performed by: INTERNAL MEDICINE

## 2024-08-09 PROCEDURE — 7100000011 HC PHASE II RECOVERY - ADDTL 15 MIN: Performed by: INTERNAL MEDICINE

## 2024-08-09 DEVICE — CRTD DTPA2QQ COBALT XT HF QUAD MRI DF4
Type: IMPLANTABLE DEVICE | Status: FUNCTIONAL
Brand: COBALT™ XT HF QUAD CRT-D MRI SURESCAN™

## 2024-08-09 RX ORDER — LIDOCAINE HYDROCHLORIDE 10 MG/ML
1 INJECTION, SOLUTION EPIDURAL; INFILTRATION; INTRACAUDAL; PERINEURAL
Status: DISCONTINUED | OUTPATIENT
Start: 2024-08-09 | End: 2024-08-09 | Stop reason: HOSPADM

## 2024-08-09 RX ORDER — SODIUM CHLORIDE 9 MG/ML
INJECTION, SOLUTION INTRAVENOUS PRN
Status: DISCONTINUED | OUTPATIENT
Start: 2024-08-09 | End: 2024-08-09 | Stop reason: HOSPADM

## 2024-08-09 RX ORDER — PROPOFOL 10 MG/ML
INJECTION, EMULSION INTRAVENOUS PRN
Status: DISCONTINUED | OUTPATIENT
Start: 2024-08-09 | End: 2024-08-09 | Stop reason: SDUPTHER

## 2024-08-09 RX ORDER — SODIUM CHLORIDE 0.9 % (FLUSH) 0.9 %
5-40 SYRINGE (ML) INJECTION PRN
Status: DISCONTINUED | OUTPATIENT
Start: 2024-08-09 | End: 2024-08-09 | Stop reason: HOSPADM

## 2024-08-09 RX ORDER — LIDOCAINE HYDROCHLORIDE 10 MG/ML
INJECTION, SOLUTION EPIDURAL; INFILTRATION; INTRACAUDAL; PERINEURAL PRN
Status: DISCONTINUED | OUTPATIENT
Start: 2024-08-09 | End: 2024-08-09 | Stop reason: SDUPTHER

## 2024-08-09 RX ORDER — SODIUM CHLORIDE, SODIUM LACTATE, POTASSIUM CHLORIDE, CALCIUM CHLORIDE 600; 310; 30; 20 MG/100ML; MG/100ML; MG/100ML; MG/100ML
INJECTION, SOLUTION INTRAVENOUS CONTINUOUS
Status: DISCONTINUED | OUTPATIENT
Start: 2024-08-09 | End: 2024-08-09 | Stop reason: HOSPADM

## 2024-08-09 RX ORDER — FENTANYL CITRATE 50 UG/ML
INJECTION, SOLUTION INTRAMUSCULAR; INTRAVENOUS PRN
Status: DISCONTINUED | OUTPATIENT
Start: 2024-08-09 | End: 2024-08-09 | Stop reason: SDUPTHER

## 2024-08-09 RX ORDER — BUPIVACAINE HYDROCHLORIDE 2.5 MG/ML
INJECTION, SOLUTION EPIDURAL; INFILTRATION; INTRACAUDAL PRN
Status: DISCONTINUED | OUTPATIENT
Start: 2024-08-09 | End: 2024-08-09 | Stop reason: HOSPADM

## 2024-08-09 RX ORDER — SODIUM CHLORIDE 0.9 % (FLUSH) 0.9 %
5-40 SYRINGE (ML) INJECTION EVERY 12 HOURS SCHEDULED
Status: DISCONTINUED | OUTPATIENT
Start: 2024-08-09 | End: 2024-08-09 | Stop reason: HOSPADM

## 2024-08-09 RX ORDER — LIDOCAINE HYDROCHLORIDE 10 MG/ML
INJECTION, SOLUTION EPIDURAL; INFILTRATION; INTRACAUDAL; PERINEURAL PRN
Status: DISCONTINUED | OUTPATIENT
Start: 2024-08-09 | End: 2024-08-09 | Stop reason: HOSPADM

## 2024-08-09 RX ADMIN — PROPOFOL 150 MCG/KG/MIN: 10 INJECTION, EMULSION INTRAVENOUS at 10:44

## 2024-08-09 RX ADMIN — PROPOFOL 30 MG: 10 INJECTION, EMULSION INTRAVENOUS at 10:43

## 2024-08-09 RX ADMIN — FENTANYL CITRATE 50 MCG: 50 INJECTION, SOLUTION INTRAMUSCULAR; INTRAVENOUS at 11:09

## 2024-08-09 RX ADMIN — FENTANYL CITRATE 25 MCG: 50 INJECTION, SOLUTION INTRAMUSCULAR; INTRAVENOUS at 11:24

## 2024-08-09 RX ADMIN — FENTANYL CITRATE 25 MCG: 50 INJECTION, SOLUTION INTRAMUSCULAR; INTRAVENOUS at 11:00

## 2024-08-09 RX ADMIN — WATER 2000 MG: 1 INJECTION INTRAMUSCULAR; INTRAVENOUS; SUBCUTANEOUS at 10:47

## 2024-08-09 RX ADMIN — LIDOCAINE HYDROCHLORIDE 100 MG: 10 INJECTION, SOLUTION EPIDURAL; INFILTRATION; INTRACAUDAL; PERINEURAL at 10:43

## 2024-08-09 RX ADMIN — PROPOFOL 30 MG: 10 INJECTION, EMULSION INTRAVENOUS at 11:23

## 2024-08-09 RX ADMIN — SODIUM CHLORIDE, SODIUM LACTATE, POTASSIUM CHLORIDE, AND CALCIUM CHLORIDE: .6; .31; .03; .02 INJECTION, SOLUTION INTRAVENOUS at 10:40

## 2024-08-09 RX ADMIN — PROPOFOL 40 MG: 10 INJECTION, EMULSION INTRAVENOUS at 10:58

## 2024-08-09 ASSESSMENT — LIFESTYLE VARIABLES: SMOKING_STATUS: 0

## 2024-08-09 NOTE — INTERVAL H&P NOTE
Update History & Physical    The patient's History and Physical of July 24, was reviewed with the patient and I examined the patient. There was no change. The surgical site was confirmed by the patient and me.     Plan: The risks, benefits, expected outcome, and alternative to the recommended procedure have been discussed with the patient. Patient understands and wants to proceed with the procedure.     Electronically signed by Radha Eli MD on 8/9/2024 at 10:57 AM

## 2024-08-09 NOTE — DISCHARGE INSTRUCTIONS
[facility] office at:  [phone]    MD Josiah Vidal, ADI Treviño, ADI Downing RN    Permanent Pacemaker (PPM)/ Implantable Cardioverter Defibrillator (ICD)  Generator Change Care Instructions    Your permanent pacemaker/ICD is a sophisticated piece of electronic equipment and both the wound and the device need special care during your recovery period and into the future.  Please use these instructions as guide.  If you have any questions please call the office.     Wound Care:   Keep the incision site clean and dry for one week.  Do not get the incision or bandage wet.   You may sponge bathe but DO NOT shower for the first seven days or until after your first follow up office visit.   Do not remove the steri strips (the pieces of \"tape\" that cover the incision). These will eventually fall off on their own. The outer dressing may be taken off once you get home.   DO NOT apply soaps, ointments,  lotion or powder to the incision site.   Wear comfortable clothes that will not rub on the incision site. Avoid bra straps or suspenders.   At your one week follow up appointment your bandage will be removed and you will be given further instruction on care of the site at that time.  Your bra strap may lay directly over the incision. If it does - please do not wear the bra strap on the incisional side for 4 weeks to prevent irritation.      When to contact the office:  Changes in how your incision site is healing including:  Increase in swelling and/or tenderness   Increase in redness at the incision site or surrounding the device  Drainage from the incision  If you experience:  Lightheadedness, dizziness or passing out  Increase in fatigue or shortness of breath  Fever (temperature greater than 100 degrees F)  Chills   Prolonged hiccups or chest discomfort  If you have any questions     Activity:   Do not raise your elbow above shoulder level or reach behind  your back for 1 week after your procedure.  DO NOT lift more than 8 pounds with your affected arm for 1 week after your procedure. (A gallon of milk is 8 pounds).  Avoid excessive pushing, pulling or twisting.  DO NOT drive until instructed by your provider.  Wear a sling only as a reminder to limit the activity of your affected arm.  It is important to remember to still use your affected arm to maintain mobility but no excessive movements.  No sports activities until approved by your provider.    Precautions:  You may use common household appliances, if they are in good repair (even microwaves).  You should not lean against them while they are on.   When using cellular and cordless phones, keep them at least 6 inches away from your device.  (Use on the opposite side of your device.)  Do not hold or carry strong magnets.  You may NOT perform welding.  Airport and security screening devices should be avoided.  You will need to show your ID card and ask for a hand search.   Always tell your health care professional (including the dentist) that you have a device and show your ID card.     Follow Up Care:  The pacemaker/ICD DOES NOT replace your current medications.  It is important for you to continue your medications as prescribed.  You will be given your first follow up appointment approximately one week after your procedure to check your wound and device.  You will then have a follow up approximately one month after that appointment and then every 3-6 months thereafter.   If you are unsure of your follow up appointment PLEASE CALL THE OFFICE.    FOLLOW-UP APPOINTMENTS    Follow-up with device technician,  in 7-10 days for a wound and device check.  Follow-up with office CNP at 1 month for a wound and device check.  Follow-up with Dr. Santos at 3 months for an appointment and device check.       [address] [facility], [address] Phone: [phone].    If you are unable to make this appointment, please call to

## 2024-08-09 NOTE — ANESTHESIA POSTPROCEDURE EVALUATION
Department of Anesthesiology  Postprocedure Note    Patient: Jim David  MRN: [id number]  YOB: 1960  Date of evaluation: 8/9/2024    Procedure Summary       Date: 08/09/24 Room / Location: [facility] SPECIAL PROCEDURES 3 / [facility] CARDIAC CATH LAB    Anesthesia Start: 1040 Anesthesia Stop: 1139    Procedure: ICD battery change Diagnosis:       CHB (complete heart block) (HCC)      Nonischemic cardiomyopathy (HCC)      (CHB (complete heart block) (HCC) [I44.2])      (Nonischemic cardiomyopathy (HCC) [I42.8])    Providers: Radha Eli MD Responsible Provider: Sadia Escalante DO    Anesthesia Type: MAC ASA Status: 3            Anesthesia Type: No value filed.    Jade Phase I: Jade Score: 10    Jade Phase II:      Anesthesia Post Evaluation    Patient location during evaluation: PACU  Patient participation: complete - patient participated  Level of consciousness: awake and alert  Airway patency: patent  Nausea & Vomiting: no nausea and no vomiting  Cardiovascular status: blood pressure returned to baseline  Respiratory status: acceptable  Hydration status: euvolemic  Pain management: adequate    There were no known notable events for this encounter.   Do You Have A Family History Of Psoriasis?: no How Severe Is Your Psoriasis?: mild Is This A New Presentation, Or A Follow-Up?: Follow Up Psoriasis

## 2024-08-09 NOTE — ANESTHESIA PRE PROCEDURE
Department of Anesthesiology  Preprocedure Note       Name:  Jmi David   Age:  64 y.o.  :  1960                                          MRN:  [id number]         Date:  2024      Surgeon: Surgeon(s):  Radha Eli MD    Procedure: Procedure(s):  ICD battery change    Medications prior to admission:   Prior to Admission medications    Medication Sig Start Date End Date Taking? Authorizing Provider   metoprolol succinate (TOPROL XL) 100 MG extended release tablet TAKE ONE TABLET BY MOUTH DAILY 24   Marychuy Oneal APRN - CNP   metFORMIN (GLUCOPHAGE-XR) 500 MG extended release tablet Take 4 tablets by mouth daily (with breakfast) 7/10/24 1/6/25  Tahmina Pagan MD   blood glucose test strips (TRUE METRIX BLOOD GLUCOSE TEST) strip 1 each by In Vitro route 2 times daily As needed. 7/10/24   Tahmina Pagan MD   empagliflozin (JARDIANCE) 25 MG tablet Take 1 tablet by mouth daily 7/10/24   Tahmina Pagan MD   pioglitazone (ACTOS) 30 MG tablet Take 1 tablet by mouth daily 7/10/24   Tahmina Pagan MD   Blood Glucose Monitoring Suppl (TRUE METRIX AIR GLUCOSE METER) DANGELO Check sugar twice a day 10/8/21   Jesse Valderrama MD   Lancets MISC 1 each by Does not apply route 2 times daily 10/8/21   Jesse Valderrama MD   famotidine (PEPCID) 20 MG tablet Take 1 tablet by mouth nightly as needed    Provider, MD Gary       Current medications:    Current Facility-Administered Medications   Medication Dose Route Frequency Provider Last Rate Last Admin    lidocaine PF 1 % injection 1 mL  1 mL IntraDERmal Once PRN J Luis Rodriguez, DO        lactated ringers IV soln infusion   IntraVENous Continuous J Luis Rodriguez, DO        sodium chloride flush 0.9 % injection 5-40 mL  5-40 mL IntraVENous 2 times per day J Luis Rodriguez, DO        sodium chloride flush 0.9 % injection 5-40 mL  5-40 mL IntraVENous PRN J Luis Rodriguez, DO        sodium chloride flush 0.9 % injection 5-40 mL

## 2024-08-10 LAB
EKG ATRIAL RATE: 61 BPM
EKG DIAGNOSIS: NORMAL
EKG P AXIS: 56 DEGREES
EKG P-R INTERVAL: 124 MS
EKG Q-T INTERVAL: 488 MS
EKG QRS DURATION: 168 MS
EKG QTC CALCULATION (BAZETT): 491 MS
EKG R AXIS: 241 DEGREES
EKG T AXIS: 66 DEGREES
EKG VENTRICULAR RATE: 61 BPM

## 2024-08-10 PROCEDURE — 93010 ELECTROCARDIOGRAM REPORT: CPT | Performed by: INTERNAL MEDICINE

## 2024-08-16 ENCOUNTER — NURSE ONLY (OUTPATIENT)
Dept: CARDIOLOGY CLINIC | Age: 64
End: 2024-08-16

## 2024-08-16 DIAGNOSIS — Z95.810 BIVENTRICULAR AUTOMATIC IMPLANTABLE CARDIOVERTER DEFIBRILLATOR IN SITU: Primary | Chronic | ICD-10-CM

## 2024-08-16 DIAGNOSIS — R00.1 BRADYCARDIA: Chronic | ICD-10-CM

## 2024-08-16 DIAGNOSIS — I44.2 CHB (COMPLETE HEART BLOCK) (HCC): Chronic | ICD-10-CM

## 2024-08-16 DIAGNOSIS — I42.8 NONISCHEMIC CARDIOMYOPATHY (HCC): ICD-10-CM

## 2024-08-16 DIAGNOSIS — I50.22 CHRONIC SYSTOLIC HEART FAILURE (HCC): Chronic | ICD-10-CM

## 2024-08-16 NOTE — PROGRESS NOTES
Patient comes in for a 1 week wound and programming evaluation s/p MDT CRTD Gen change by GORAN on 08.09.2024.    Relay HM shipped 08.12.2024.  Discussed setup, reviewed remote appt frequencies and future communications.  Relay literature, SHRUTI brochure and AVS provided today.    Dressing removed from left chest device site.  Incision appears well approximated, CDI, SS remain.  Pt informed to call office if he develops any fever, chills, increased swelling, redness or drainage from site.  Pt voiced an understanding.    Interrogation available in [employer] and will be uploaded in EPIC under Cardiology tab once reviewed by EPMD.  PT will follow up in 6 months w/EPNP and device.  We will monitor remotely once new equipment received and connected.

## 2024-12-17 NOTE — PROGRESS NOTES
[facility]   Electrophysiology  Office Visit  Date: 1/9/2025    Chief Complaint   Patient presents with    Bradycardia    Cardiomyopathy    Congestive Heart Failure    Hypertension       Cardiac HX: Jim David is a 64 y.o. man with a h/o HTN, high degree AV block, s/p dual ch MDT PPM (5/2014, Dr. Rain), EF had been 55%, 100%  burden, developed NICMP (EF 15%), s/p upgrade to an ICD (5/11/2016), unable to place LV lead d/t inability to cannulate the CS, reattempt with an successful upgrade to a CRT device (8/17/2016, Dr. Rain), increased PVCs and NSVT noted on device. EF improved to 50-55%, S/p MDT CRTD GEN CHANGE 8/9/2024, Dr Santos    Interval History/HPI:     Patient is here for follow-up for NICMP, PVCs and ICD management. Patient has longstanding history of a high degree AV block for which he was implanted with a dual-chamber MDT PPM in 2014.  His EF was originally 55%.  He was noted to have 100% pacing burden in the ventricle and developed a nonischemic cardiomyopathy.  He underwent an attempted upgrade to a CRT-D in May 2016 however the CS was unable to be cannulated and he was implanted with a dual-chamber ICD.  He underwent a second successful attempt to upgrade to a CRT-D on 8/17/2016.  His EF did improve to 50 to 55%.   Patient presents in VPacing, HR 64.     Review of his device today shows 4% AP, 99.9% , OptiVol within normal limits, PVC 3.5/hr. 1 NSVT, 4 VSE. He is s/p Gen Change (8/9/24).   He has followed with Dr. Manrique for his CHF. However, he has not see her in 2 years.   He had made some dietary changes and was down to 206 pounds at his lightest, weight today is 225lb. Blood pressure is well controlled in the office today.  Patient has become established with a new PCP, Dr. Pagan to manage diabetes. He had been compliant on Actos, metformin and Jardiance  An echo was ordered previous visit but he did not obtain. He states that he is very busy but will make getting an Echo

## 2025-01-09 ENCOUNTER — OFFICE VISIT (OUTPATIENT)
Dept: CARDIOLOGY CLINIC | Age: 65
End: 2025-01-09

## 2025-01-09 ENCOUNTER — NURSE ONLY (OUTPATIENT)
Dept: CARDIOLOGY CLINIC | Age: 65
End: 2025-01-09

## 2025-01-09 VITALS
HEART RATE: 64 BPM | SYSTOLIC BLOOD PRESSURE: 114 MMHG | WEIGHT: 225 LBS | BODY MASS INDEX: 32.28 KG/M2 | DIASTOLIC BLOOD PRESSURE: 74 MMHG

## 2025-01-09 DIAGNOSIS — I42.8 NICM (NONISCHEMIC CARDIOMYOPATHY) (HCC): Primary | ICD-10-CM

## 2025-01-09 DIAGNOSIS — I10 PRIMARY HYPERTENSION: ICD-10-CM

## 2025-01-09 DIAGNOSIS — Z95.810 BIVENTRICULAR AUTOMATIC IMPLANTABLE CARDIOVERTER DEFIBRILLATOR IN SITU: Primary | Chronic | ICD-10-CM

## 2025-01-09 DIAGNOSIS — Z45.02 IMPLANTABLE CARDIOVERTER-DEFIBRILLATOR (ICD) GENERATOR END OF LIFE: ICD-10-CM

## 2025-01-09 DIAGNOSIS — I50.22 CHRONIC SYSTOLIC HEART FAILURE (HCC): Chronic | ICD-10-CM

## 2025-01-09 DIAGNOSIS — I44.2 CHB (COMPLETE HEART BLOCK) (HCC): Chronic | ICD-10-CM

## 2025-01-09 DIAGNOSIS — I44.2 HEART BLOCK AV COMPLETE (HCC): ICD-10-CM

## 2025-01-09 DIAGNOSIS — I50.22 CHRONIC SYSTOLIC CHF (CONGESTIVE HEART FAILURE) (HCC): ICD-10-CM

## 2025-01-09 DIAGNOSIS — I49.3 PVC (PREMATURE VENTRICULAR CONTRACTION): ICD-10-CM

## 2025-01-09 DIAGNOSIS — E11.9 TYPE 2 DIABETES MELLITUS WITHOUT COMPLICATION, WITHOUT LONG-TERM CURRENT USE OF INSULIN (HCC): ICD-10-CM

## 2025-01-09 DIAGNOSIS — I42.8 NONISCHEMIC CARDIOMYOPATHY (HCC): ICD-10-CM

## 2025-01-10 RX ORDER — METFORMIN HYDROCHLORIDE 500 MG/1
TABLET, EXTENDED RELEASE ORAL
Qty: 360 TABLET | Refills: 1 | Status: SHIPPED | OUTPATIENT
Start: 2025-01-10

## 2025-01-10 RX ORDER — PIOGLITAZONE 30 MG/1
30 TABLET ORAL DAILY
Qty: 90 TABLET | Refills: 1 | Status: SHIPPED | OUTPATIENT
Start: 2025-01-10

## 2025-07-16 DIAGNOSIS — E11.9 TYPE 2 DIABETES MELLITUS WITHOUT COMPLICATION, WITHOUT LONG-TERM CURRENT USE OF INSULIN (HCC): ICD-10-CM

## 2025-07-16 RX ORDER — METFORMIN HYDROCHLORIDE 500 MG/1
TABLET, EXTENDED RELEASE ORAL
Qty: 360 TABLET | Refills: 0 | Status: SHIPPED | OUTPATIENT
Start: 2025-07-16

## 2025-07-16 RX ORDER — PIOGLITAZONE 30 MG/1
30 TABLET ORAL DAILY
Qty: 90 TABLET | Refills: 0 | Status: SHIPPED | OUTPATIENT
Start: 2025-07-16

## 2025-07-16 NOTE — TELEPHONE ENCOUNTER
Last appointment: 7/24/2024  Next appointment: Visit date not found        Last refill:   Both   (1/10/2025) by Tahmina Pagan MD

## 2025-07-17 DIAGNOSIS — I10 PRIMARY HYPERTENSION: ICD-10-CM

## 2025-07-17 RX ORDER — METOPROLOL SUCCINATE 100 MG/1
100 TABLET, EXTENDED RELEASE ORAL DAILY
Qty: 90 TABLET | Refills: 3 | Status: SHIPPED | OUTPATIENT
Start: 2025-07-17

## 2025-07-17 NOTE — TELEPHONE ENCOUNTER
Requested Prescriptions     Pending Prescriptions Disp Refills    metoprolol succinate (TOPROL XL) 100 MG extended release tablet 90 tablet 3     Sig: Take 1 tablet by mouth daily            Checked Correct Pharmacy: Yes    Any changes since last refill? No     Number: 90  Refills: 3    Last OV: 1/9/2025 Provider: CLAUDE    Next OV: 8/19/2025 Provider: UL    Last Labs:   CBC:  Lab Results   Component Value Date    WBC 5.5 08/09/2024    HGB 14.8 08/09/2024    HCT 43.8 08/09/2024    MCV 90.4 08/09/2024     08/09/2024    LYMPHOPCT 26.2 09/17/2022    RBC 4.84 08/09/2024    MCH 30.6 08/09/2024    MCHC 33.9 08/09/2024    RDW 14.2 08/09/2024           BMP:  Lab Results   Component Value Date/Time     08/09/2024 09:53 AM    K 3.7 08/09/2024 09:53 AM    K 4.2 10/13/2021 02:40 PM     08/09/2024 09:53 AM    CO2 21 08/09/2024 09:53 AM    BUN 15 08/09/2024 09:53 AM    CREATININE 1.1 08/09/2024 09:53 AM    GLUCOSE 150 08/09/2024 09:53 AM    CALCIUM 9.2 08/09/2024 09:53 AM    LABGLOM 76 08/09/2024 09:53 AM      Coagulation:  Lab Results   Component Value Date/Time    INR 1.20 08/09/2024 09:37 AM   ,   Lab Results   Component Value Date/Time    APTT 31.2 05/13/2014 05:18 AM

## (undated) DEVICE — PACEMAKER PACK: Brand: MEDLINE INDUSTRIES, INC.

## (undated) DEVICE — PLASMABLADE PS210-030S 3.0S LOCK: Brand: PLASMABLADE™